# Patient Record
Sex: FEMALE | Race: BLACK OR AFRICAN AMERICAN | Employment: UNEMPLOYED | URBAN - METROPOLITAN AREA
[De-identification: names, ages, dates, MRNs, and addresses within clinical notes are randomized per-mention and may not be internally consistent; named-entity substitution may affect disease eponyms.]

---

## 2017-03-24 ENCOUNTER — APPOINTMENT (OUTPATIENT)
Dept: GENERAL RADIOLOGY | Age: 62
DRG: 283 | End: 2017-03-24
Attending: EMERGENCY MEDICINE
Payer: MEDICAID

## 2017-03-24 ENCOUNTER — APPOINTMENT (OUTPATIENT)
Dept: CT IMAGING | Age: 62
DRG: 283 | End: 2017-03-24
Attending: EMERGENCY MEDICINE
Payer: MEDICAID

## 2017-03-24 ENCOUNTER — APPOINTMENT (OUTPATIENT)
Dept: CT IMAGING | Age: 62
DRG: 283 | End: 2017-03-24
Attending: PHYSICIAN ASSISTANT
Payer: MEDICAID

## 2017-03-24 ENCOUNTER — HOSPITAL ENCOUNTER (INPATIENT)
Age: 62
LOS: 5 days | Discharge: HOME HEALTH | DRG: 283 | End: 2017-03-30
Attending: EMERGENCY MEDICINE | Admitting: HOSPITALIST
Payer: MEDICAID

## 2017-03-24 DIAGNOSIS — D64.9 SEVERE ANEMIA: Primary | ICD-10-CM

## 2017-03-24 DIAGNOSIS — R18.8 CIRRHOSIS OF LIVER WITH ASCITES, UNSPECIFIED HEPATIC CIRRHOSIS TYPE (HCC): ICD-10-CM

## 2017-03-24 DIAGNOSIS — B20 HIV (HUMAN IMMUNODEFICIENCY VIRUS INFECTION) (HCC): ICD-10-CM

## 2017-03-24 DIAGNOSIS — K74.60 CIRRHOSIS OF LIVER WITH ASCITES, UNSPECIFIED HEPATIC CIRRHOSIS TYPE (HCC): ICD-10-CM

## 2017-03-24 LAB
ALBUMIN SERPL BCP-MCNC: 2 G/DL (ref 3.5–5)
ALBUMIN/GLOB SERPL: 0.3 {RATIO} (ref 1.1–2.2)
ALP SERPL-CCNC: 130 U/L (ref 45–117)
ALT SERPL-CCNC: 39 U/L (ref 12–78)
ANION GAP BLD CALC-SCNC: 11 MMOL/L (ref 5–15)
AST SERPL W P-5'-P-CCNC: 69 U/L (ref 15–37)
BASOPHILS # BLD AUTO: 0 K/UL (ref 0–0.1)
BASOPHILS # BLD: 1 % (ref 0–1)
BILIRUB SERPL-MCNC: 0.4 MG/DL (ref 0.2–1)
BUN SERPL-MCNC: 17 MG/DL (ref 6–20)
BUN/CREAT SERPL: 18 (ref 12–20)
CALCIUM SERPL-MCNC: 7.5 MG/DL (ref 8.5–10.1)
CHLORIDE SERPL-SCNC: 113 MMOL/L (ref 97–108)
CK MB CFR SERPL CALC: 1.1 % (ref 0–2.5)
CK MB SERPL-MCNC: 1.3 NG/ML (ref 5–25)
CK SERPL-CCNC: 121 U/L (ref 26–192)
CO2 SERPL-SCNC: 18 MMOL/L (ref 21–32)
CREAT SERPL-MCNC: 0.97 MG/DL (ref 0.55–1.02)
EOSINOPHIL # BLD: 0.1 K/UL (ref 0–0.4)
EOSINOPHIL NFR BLD: 2 % (ref 0–7)
ERYTHROCYTE [DISTWIDTH] IN BLOOD BY AUTOMATED COUNT: 19.5 % (ref 11.5–14.5)
GLOBULIN SER CALC-MCNC: 6.1 G/DL (ref 2–4)
GLUCOSE SERPL-MCNC: 135 MG/DL (ref 65–100)
HCT VFR BLD AUTO: 16.4 % (ref 35–47)
HGB BLD-MCNC: 4.3 G/DL (ref 11.5–16)
INR PPP: 1.4 (ref 0.9–1.1)
LACTATE SERPL-SCNC: 1 MMOL/L (ref 0.4–2)
LYMPHOCYTES # BLD AUTO: 32 % (ref 12–49)
LYMPHOCYTES # BLD: 1.2 K/UL (ref 0.8–3.5)
MAGNESIUM SERPL-MCNC: 2.3 MG/DL (ref 1.6–2.4)
MCH RBC QN AUTO: 18.1 PG (ref 26–34)
MCHC RBC AUTO-ENTMCNC: 26.2 G/DL (ref 30–36.5)
MCV RBC AUTO: 69.2 FL (ref 80–99)
MONOCYTES # BLD: 0.4 K/UL (ref 0–1)
MONOCYTES NFR BLD AUTO: 10 % (ref 5–13)
NEUTS SEG # BLD: 2 K/UL (ref 1.8–8)
NEUTS SEG NFR BLD AUTO: 55 % (ref 32–75)
PLATELET # BLD AUTO: 166 K/UL (ref 150–400)
POTASSIUM SERPL-SCNC: 3.5 MMOL/L (ref 3.5–5.1)
PROT SERPL-MCNC: 8.1 G/DL (ref 6.4–8.2)
PROTHROMBIN TIME: 14.5 SEC (ref 9–11.1)
RBC # BLD AUTO: 2.37 M/UL (ref 3.8–5.2)
RBC MORPH BLD: ABNORMAL
SODIUM SERPL-SCNC: 142 MMOL/L (ref 136–145)
TROPONIN I SERPL-MCNC: <0.04 NG/ML
WBC # BLD AUTO: 3.7 K/UL (ref 3.6–11)

## 2017-03-24 PROCEDURE — 86920 COMPATIBILITY TEST SPIN: CPT | Performed by: EMERGENCY MEDICINE

## 2017-03-24 PROCEDURE — 87086 URINE CULTURE/COLONY COUNT: CPT | Performed by: EMERGENCY MEDICINE

## 2017-03-24 PROCEDURE — 70491 CT SOFT TISSUE NECK W/DYE: CPT

## 2017-03-24 PROCEDURE — 74177 CT ABD & PELVIS W/CONTRAST: CPT

## 2017-03-24 PROCEDURE — 93005 ELECTROCARDIOGRAM TRACING: CPT

## 2017-03-24 PROCEDURE — 36415 COLL VENOUS BLD VENIPUNCTURE: CPT | Performed by: EMERGENCY MEDICINE

## 2017-03-24 PROCEDURE — 74011250636 HC RX REV CODE- 250/636: Performed by: PHYSICIAN ASSISTANT

## 2017-03-24 PROCEDURE — 87040 BLOOD CULTURE FOR BACTERIA: CPT | Performed by: EMERGENCY MEDICINE

## 2017-03-24 PROCEDURE — P9016 RBC LEUKOCYTES REDUCED: HCPCS | Performed by: EMERGENCY MEDICINE

## 2017-03-24 PROCEDURE — 36430 TRANSFUSION BLD/BLD COMPNT: CPT

## 2017-03-24 PROCEDURE — 83735 ASSAY OF MAGNESIUM: CPT | Performed by: PHYSICIAN ASSISTANT

## 2017-03-24 PROCEDURE — 86900 BLOOD TYPING SEROLOGIC ABO: CPT | Performed by: EMERGENCY MEDICINE

## 2017-03-24 PROCEDURE — 96365 THER/PROPH/DIAG IV INF INIT: CPT

## 2017-03-24 PROCEDURE — 96375 TX/PRO/DX INJ NEW DRUG ADDON: CPT

## 2017-03-24 PROCEDURE — 84484 ASSAY OF TROPONIN QUANT: CPT | Performed by: PHYSICIAN ASSISTANT

## 2017-03-24 PROCEDURE — 83605 ASSAY OF LACTIC ACID: CPT | Performed by: PHYSICIAN ASSISTANT

## 2017-03-24 PROCEDURE — 99285 EMERGENCY DEPT VISIT HI MDM: CPT

## 2017-03-24 PROCEDURE — 86701 HIV-1ANTIBODY: CPT | Performed by: EMERGENCY MEDICINE

## 2017-03-24 PROCEDURE — 82550 ASSAY OF CK (CPK): CPT | Performed by: PHYSICIAN ASSISTANT

## 2017-03-24 PROCEDURE — 71010 XR CHEST PORT: CPT

## 2017-03-24 PROCEDURE — 80053 COMPREHEN METABOLIC PANEL: CPT | Performed by: EMERGENCY MEDICINE

## 2017-03-24 PROCEDURE — 85610 PROTHROMBIN TIME: CPT | Performed by: EMERGENCY MEDICINE

## 2017-03-24 PROCEDURE — 81001 URINALYSIS AUTO W/SCOPE: CPT | Performed by: EMERGENCY MEDICINE

## 2017-03-24 PROCEDURE — 96361 HYDRATE IV INFUSION ADD-ON: CPT

## 2017-03-24 PROCEDURE — 85025 COMPLETE CBC W/AUTO DIFF WBC: CPT | Performed by: EMERGENCY MEDICINE

## 2017-03-24 RX ORDER — MORPHINE SULFATE 2 MG/ML
4 INJECTION, SOLUTION INTRAMUSCULAR; INTRAVENOUS
Status: COMPLETED | OUTPATIENT
Start: 2017-03-24 | End: 2017-03-24

## 2017-03-24 RX ORDER — SODIUM CHLORIDE 9 MG/ML
50 INJECTION, SOLUTION INTRAVENOUS
Status: COMPLETED | OUTPATIENT
Start: 2017-03-24 | End: 2017-03-25

## 2017-03-24 RX ORDER — SODIUM CHLORIDE 9 MG/ML
250 INJECTION, SOLUTION INTRAVENOUS AS NEEDED
Status: DISCONTINUED | OUTPATIENT
Start: 2017-03-24 | End: 2017-03-26 | Stop reason: ALTCHOICE

## 2017-03-24 RX ORDER — SODIUM CHLORIDE 0.9 % (FLUSH) 0.9 %
10 SYRINGE (ML) INJECTION
Status: COMPLETED | OUTPATIENT
Start: 2017-03-24 | End: 2017-03-25

## 2017-03-24 RX ADMIN — SODIUM CHLORIDE 500 ML: 900 INJECTION, SOLUTION INTRAVENOUS at 22:21

## 2017-03-24 RX ADMIN — Medication 4 MG: at 22:23

## 2017-03-24 NOTE — IP AVS SNAPSHOT
[address] [facility] 83. 
[phone] Patient: Conchita Ricardo MRN: [id number] NSD:3/92/[id number] You are allergic to the following Allergen Reactions Codeine Itching Patient denies Codeine allergy Penicillin V Hives Patient denies penicillin allergy. Pollen Extracts Runny Nose Itchy, headaches, sneezing Recent Documentation Height Weight Breastfeeding? BMI OB Status Smoking Status 1.702 m 69.7 kg No 24.07 kg/m2 Hysterectomy Current Every Day Smoker Unresulted Labs Order Current Status AFB CULTURE + SMEAR W/RFLX PCR AND ID In process AFB CULTURE + SMEAR W/RFLX PCR AND ID In process COMPLEMENT C2 In process CRYOGLOBULIN, QL In process CULTURE, FUNGUS In process HLA  GENOTYPING In process QUANTIFERON TB GOLD(CLIENT INCUB.) In process SAMPLE TO BLOOD BANK In process CULTURE, ANAEROBIC Preliminary result CULTURE, BODY FLUID W GRAM STAIN Preliminary result HEPATITIS C GENOTYPE Preliminary result Emergency Contacts Name Discharge Info Relation Home Work Mobile [employer] DISCHARGE CAREGIVER [3] Child [2] [phone] About your hospitalization You were admitted on:  March 25, 2017 You last received care in the:  [facility] 2 GENERAL SURGERY You were discharged on:  March 30, 2017 Unit phone number:  [phone] Why you were hospitalized Your primary diagnosis was:  Generalized Abdominal Pain Your diagnoses also included:  Severe Anemia, Chronic Hepatitis C Without Mention Of Hepatic Coma (Hcc), Hiv (Human Immunodeficiency Virus Infection) (Hcc) Providers Seen During Your Hospitalizations Provider Role Specialty Primary office phone Joshua Walton MD Attending Provider Emergency Medicine [phone] Evalina Castleman, MD Attending Provider Internal Medicine [phone] Chloé Bello MD Attending Provider Internal Medicine [phone] Priti Rodriguez MD Attending Provider Hospitalist [phone] Your Primary Care Physician (PCP) Primary Care Physician Office Phone Office Fax [address], [address] [place] [phone] Follow-up Information Follow up With Details Comments Contact Info Ramin Blackman MD   [address] [facility] 57 
[phone] Juan Alberto Wills MD Go on 4/13/2017 APPOINTMENT TIME 3:00 PM [address] [place] [address] 
[phone] Gio Samayoa MD Call someone will call you with an appointment date and time [facility] 203 [facility] 
[phone] Martell Brunner, MD Schedule an appointment as soon as possible for a visit Call to make an appointment to see Dr. Shayy Meier in two weeks [address] [place] [address] [facility] 
[phone] Current Discharge Medication List  
  
START taking these medications Dose & Instructions Dispensing Information Comments Morning Noon Evening Bedtime  
 ferrous sulfate 325 mg (65 mg iron) tablet Your last dose was: Your next dose is:    
   
   
 Dose:  325 mg Take 1 Tab by mouth two (2) times daily (with meals). Quantity:  60 Tab Refills:  1  
     
   
   
   
  
 lactulose 10 gram/15 mL solution Commonly known as:  Maximiano Ros Your last dose was: Your next dose is:    
   
   
 Dose:  10 g Take 15 mL by mouth two (2) times a day for 30 days. Quantity:  900 mL Refills:  1  
     
   
   
   
  
 moxifloxacin 400 mg tablet Commonly known as:  Verlyn Nay Your last dose was: Your next dose is:    
   
   
 Dose:  400 mg Take 1 Tab by mouth daily for 7 days. Quantity:  7 Tab Refills:  0 propranolol 10 mg tablet Commonly known as:  INDERAL Your last dose was: Your next dose is:    
   
   
 Dose:  10 mg Take 1 Tab by mouth two (2) times a day. Quantity:  60 Tab Refills:  1  
     
   
   
   
  
 spironolactone 25 mg tablet Commonly known as:  ALDACTONE Your last dose was: Your next dose is:    
   
   
 Dose:  75 mg Take 3 Tabs by mouth daily. Quantity:  90 Tab Refills:  1  
     
   
   
   
  
 trimethoprim-sulfamethoxazole  mg per tablet Commonly known as:  BACTRIM Your last dose was: Your next dose is:    
   
   
 Dose:  1 Tab Take 1 Tab by mouth two (2) times a day for 7 days. Quantity:  14 Tab Refills:  0 CONTINUE these medications which have CHANGED Dose & Instructions Dispensing Information Comments Morning Noon Evening Bedtime  
 insulin lispro 100 unit/mL kwikpen Commonly known as:  HumaLOG KwikPen What changed:  See the new instructions. Your last dose was: Your next dose is:    
   
   
 Dose:  1 Units 1 Units by SubCUTAneous route Before breakfast, lunch, dinner and at bedtime. 150-200=2 nits 201-250=4 units 251-300=6 units 301-350=8 units 351-400=10 units More than 400 call MD  
 Quantity:  15 mL Refills:  1  
     
   
   
   
  
 oxyCODONE IR 10 mg Tab immediate release tablet Commonly known as:  Arcenio Mcnulty What changed:   
- medication strength 
- how much to take - when to take this 
- reasons to take this Your last dose was: Your next dose is:    
   
   
 Dose:  10 mg Take 1 Tab by mouth every four (4) hours as needed. Max Daily Amount: 60 mg.  
 Quantity:  30 Tab Refills:  0 CONTINUE these medications which have NOT CHANGED Dose & Instructions Dispensing Information Comments Morning Noon Evening Bedtime  
 albuterol 90 mcg/actuation inhaler Commonly known as:  PROVENTIL HFA, VENTOLIN HFA, PROAIR HFA Your last dose was: Your next dose is:    
   
   
 Dose:  2 Puff Take 2 puffs by inhalation every four (4) hours as needed for Wheezing. Quantity:  1 Inhaler Refills:  12 Blood-Glucose Meter monitoring kit Your last dose was: Your next dose is:    
   
   
 Monitor your blood sugar 3 times daily. Quantity:  1 Kit Refills:  0  
     
   
   
   
  
 FISH OIL 1,000 mg Cap Generic drug:  omega-3 fatty acids-vitamin e Your last dose was: Your next dose is:    
   
   
 Dose:  1 Cap Take 1 Cap by mouth every other day. Refills:  0  
     
   
   
   
  
 fluticasone-salmeterol 250-50 mcg/dose diskus inhaler Commonly known as:  ADVAIR Your last dose was: Your next dose is:    
   
   
 Dose:  1 Puff Take 1 Puff by inhalation two (2) times a day. Quantity:  1 Inhaler Refills:  12  
     
   
   
   
  
 * glucose blood VI test strips strip Commonly known as:  blood glucose test  
   
Your last dose was: Your next dose is: As directed Quantity:  100 Strip Refills:  11  
     
   
   
   
  
 * glucose blood VI test strips strip Commonly known as:  TRUETEST TEST STRIPS Your last dose was: Your next dose is:    
   
   
 Patient test once daily Quantity:  50 Strip Refills:  11 Insulin Needles (Disposable) 31 gauge x 3/16\" Ndle Commonly known as:  BD INSULIN PEN NEEDLE UF MINI Your last dose was: Your next dose is:    
   
   
 Use as directed with insulin pen Quantity:  100 Each Refills:  12 * Lancets Misc Your last dose was: Your next dose is: As directed Quantity:  1 Each Refills:  11  
     
   
   
   
  
 * TRUEPLUS LANCETS 33 gauge Misc Generic drug:  lancets Your last dose was: Your next dose is:    
   
   
  Refills:  0 Nebulizer & Compressor machine Your last dose was: Your next dose is:    
   
   
 Dose:  1 Each  
1 each by Does Not Apply route four (4) times daily as needed. Quantity:  1 each Refills:  0 SPIRIVA WITH HANDIHALER 18 mcg inhalation capsule Generic drug:  tiotropium Your last dose was: Your next dose is:    
   
   
 inhale the contents of one capsule in the handihaler once daily Quantity:  30 Cap Refills:  2  
     
   
   
   
  
 triamcinolone 0.5 % topical cream  
Commonly known as:  ARISTOCORT Your last dose was: Your next dose is:    
   
   
 Apply  to affected area two (2) times a day. use thin layer Quantity:  45 g Refills:  5 VITAMIN C 1,000 mg tablet Generic drug:  ascorbic acid (vitamin C) Your last dose was: Your next dose is: Take  by mouth every other day. Refills:  0  
     
   
   
   
  
 * Notice: This list has 4 medication(s) that are the same as other medications prescribed for you. Read the directions carefully, and ask your doctor or other care provider to review them with you. STOP taking these medications   
 amLODIPine 10 mg tablet Commonly known as:  Suri Fraction HYDROcodone-acetaminophen 7.5-325 mg per tablet Commonly known as:  NORCO  
   
  
 ibuprofen 800 mg tablet Commonly known as:  MOTRIN  
   
  
 insulin glargine 100 unit/mL injection Commonly known as:  LANTUS  
   
  
 naproxen 500 mg tablet Commonly known as:  NAPROSYN Where to Get Your Medications Information on where to get these meds will be given to you by the nurse or doctor. ! Ask your nurse or doctor about these medications  
  ferrous sulfate 325 mg (65 mg iron) tablet  
 insulin lispro 100 unit/mL kwikpen  
 lactulose 10 gram/15 mL solution moxifloxacin 400 mg tablet  
 oxyCODONE IR 10 mg Tab immediate release tablet  
 propranolol 10 mg tablet  
 spironolactone 25 mg tablet  
 trimethoprim-sulfamethoxazole  mg per tablet Discharge Instructions HOSPITALIST DISCHARGE INSTRUCTIONS 
NAME: Tanika LINDSEY:  1955 MRN:  [id number] Date/Time:  3/30/2017 10:05 AM 
 
ADMIT DATE: 3/24/2017 DISCHARGE DATE: 3/30/2017 DIAGNOSIS:  Leg Cellulitis and infected wound, SBP, Cirrhosis, HIV, HepC, DM, COPD MEDICATIONS As per medication reconciliation · It is important that you take the medication exactly as they are prescribed. · Keep your medication in the bottles provided by the pharmacist and keep a list of the medication names, dosages, and times to be taken in your wallet. · Do not take other medications without consulting your doctor. Pain Management: per above medications What to do at [facility] Recommended diet:  Cardiac Diet and Diabetic Diet Recommended activity: Activity as tolerated and No driving while on analgesics If you experience any of the following symptoms then please call your primary care physician or return to the emergency room if you cannot get hold of your doctor: 
Fever, chills, nausea, vomiting, diarrhea, change in mentation, falling, bleeding, shortness of breath. Follow Up: 
 PCP you are to call and set up an appointment to see them in 2 week. F/U GI 
F/U ID 
F/U Rheumatology Information obtained by : 
I understand that if any problems occur once I am at home I am to contact my physician. I understand and acknowledge receipt of the instructions indicated above. Physician's or R.N.'s Signature                                                                  Date/Time Patient or Representative Signature                                                          Date/Time Discharge Orders None [employer] Announcement We are excited to announce that we are making your provider's discharge notes available to you in QR Wild. You will see these notes when they are completed and signed by the physician that discharged you from your recent hospital stay. If you have any questions or concerns about any information you see in QR Wild, please call the Health Information Department where you were seen or reach out to your Primary Care Provider for more information about your plan of care. Introducing [facility]! [employer] introduces QR Wild patient portal. Now you can access parts of your medical record, email your doctor's office, and request medication refills online. 1. In your internet browser, go to [email or web address] [employer] Share. [employer]/Compare And Share 2. Click on the First Time User? Click Here link in the Sign In box. You will see the New Member Sign Up page. 3. Enter your QR Wild Access Code exactly as it appears below. You will not need to use this code after youve completed the sign-up process. If you do not sign up before the expiration date, you must request a new code. · QR Wild Access Code: [email or web address] Expires: 6/22/2017  7:09 PM 
 
4. Enter the last four digits of your Social Security Number (xxxx) and Date of Birth (mm/dd/yyyy) as indicated and click Submit. You will be taken to the next sign-up page. 5. Create a QR Wild ID. This will be your QR Wild login ID and cannot be changed, so think of one that is secure and easy to remember. 6. Create a QR Wild password. You can change your password at any time. 7. Enter your Password Reset Question and Answer.  This can be used at a later time if you forget your password. 8. Enter your e-mail address. You will receive e-mail notification when new information is available in [address] 9. Click Sign Up. You can now view and download portions of your medical record. 10. Click the Download Summary menu link to download a portable copy of your medical information. If you have questions, please visit the Frequently Asked Questions section of the [employer] website. Remember, [employer] is NOT to be used for urgent needs. For medical emergencies, dial 911. Now available from your iPhone and Android! General Information Please provide this summary of care documentation to your next provider. Patient Signature:  ____________________________________________________________ Date:  ____________________________________________________________  
  
Aloma Snellen Provider Signature:  ____________________________________________________________ Date:  ____________________________________________________________

## 2017-03-24 NOTE — IP AVS SNAPSHOT
Current Discharge Medication List  
  
START taking these medications Dose & Instructions Dispensing Information Comments Morning Noon Evening Bedtime  
 ferrous sulfate 325 mg (65 mg iron) tablet Your last dose was: Your next dose is:    
   
   
 Dose:  325 mg Take 1 Tab by mouth two (2) times daily (with meals). Quantity:  60 Tab Refills:  1  
     
   
   
   
  
 lactulose 10 gram/15 mL solution Commonly known as:  Tal Best Your last dose was: Your next dose is:    
   
   
 Dose:  10 g Take 15 mL by mouth two (2) times a day for 30 days. Quantity:  900 mL Refills:  1  
     
   
   
   
  
 moxifloxacin 400 mg tablet Commonly known as:  Gordan Dire Your last dose was: Your next dose is:    
   
   
 Dose:  400 mg Take 1 Tab by mouth daily for 7 days. Quantity:  7 Tab Refills:  0  
     
   
   
   
  
 propranolol 10 mg tablet Commonly known as:  INDERAL Your last dose was: Your next dose is:    
   
   
 Dose:  10 mg Take 1 Tab by mouth two (2) times a day. Quantity:  60 Tab Refills:  1  
     
   
   
   
  
 spironolactone 25 mg tablet Commonly known as:  ALDACTONE Your last dose was: Your next dose is:    
   
   
 Dose:  75 mg Take 3 Tabs by mouth daily. Quantity:  90 Tab Refills:  1  
     
   
   
   
  
 trimethoprim-sulfamethoxazole  mg per tablet Commonly known as:  BACTRIM Your last dose was: Your next dose is:    
   
   
 Dose:  1 Tab Take 1 Tab by mouth two (2) times a day for 7 days. Quantity:  14 Tab Refills:  0 CONTINUE these medications which have CHANGED Dose & Instructions Dispensing Information Comments Morning Noon Evening Bedtime  
 insulin lispro 100 unit/mL kwikpen Commonly known as:  HumaLOG KwikPen What changed:  See the new instructions. Your last dose was: Your next dose is:    
   
   
 Dose:  1 Units 1 Units by SubCUTAneous route Before breakfast, lunch, dinner and at bedtime. 150-200=2 nits 201-250=4 units 251-300=6 units 301-350=8 units 351-400=10 units More than 400 call MD  
 Quantity:  15 mL Refills:  1  
     
   
   
   
  
 oxyCODONE IR 10 mg Tab immediate release tablet Commonly known as:  Jarad Bello What changed:   
- medication strength 
- how much to take - when to take this 
- reasons to take this Your last dose was: Your next dose is:    
   
   
 Dose:  10 mg Take 1 Tab by mouth every four (4) hours as needed. Max Daily Amount: 60 mg.  
 Quantity:  30 Tab Refills:  0 CONTINUE these medications which have NOT CHANGED Dose & Instructions Dispensing Information Comments Morning Noon Evening Bedtime  
 albuterol 90 mcg/actuation inhaler Commonly known as:  PROVENTIL HFA, VENTOLIN HFA, PROAIR HFA Your last dose was: Your next dose is:    
   
   
 Dose:  2 Puff Take 2 puffs by inhalation every four (4) hours as needed for Wheezing. Quantity:  1 Inhaler Refills:  12 Blood-Glucose Meter monitoring kit Your last dose was: Your next dose is:    
   
   
 Monitor your blood sugar 3 times daily. Quantity:  1 Kit Refills:  0  
     
   
   
   
  
 FISH OIL 1,000 mg Cap Generic drug:  omega-3 fatty acids-vitamin e Your last dose was: Your next dose is:    
   
   
 Dose:  1 Cap Take 1 Cap by mouth every other day. Refills:  0  
     
   
   
   
  
 fluticasone-salmeterol 250-50 mcg/dose diskus inhaler Commonly known as:  ADVAIR Your last dose was: Your next dose is:    
   
   
 Dose:  1 Puff Take 1 Puff by inhalation two (2) times a day. Quantity:  1 Inhaler Refills:  12  
     
   
   
   
  
 * glucose blood VI test strips strip Commonly known as:  blood glucose test  
   
 Your last dose was: Your next dose is: As directed Quantity:  100 Strip Refills:  11  
     
   
   
   
  
 * glucose blood VI test strips strip Commonly known as:  TRUETEST TEST STRIPS Your last dose was: Your next dose is:    
   
   
 Patient test once daily Quantity:  50 Strip Refills:  11 Insulin Needles (Disposable) 31 gauge x 3/16\" Ndle Commonly known as:  BD INSULIN PEN NEEDLE UF MINI Your last dose was: Your next dose is:    
   
   
 Use as directed with insulin pen Quantity:  100 Each Refills:  12 * Lancets Misc Your last dose was: Your next dose is: As directed Quantity:  1 Each Refills:  11  
     
   
   
   
  
 * TRUEPLUS LANCETS 33 gauge Misc Generic drug:  lancets Your last dose was: Your next dose is:    
   
   
  Refills:  0 Nebulizer & Compressor machine Your last dose was: Your next dose is:    
   
   
 Dose:  1 Each  
1 each by Does Not Apply route four (4) times daily as needed. Quantity:  1 each Refills:  0 SPIRIVA WITH HANDIHALER 18 mcg inhalation capsule Generic drug:  tiotropium Your last dose was: Your next dose is:    
   
   
 inhale the contents of one capsule in the handihaler once daily Quantity:  30 Cap Refills:  2  
     
   
   
   
  
 triamcinolone 0.5 % topical cream  
Commonly known as:  ARISTOCORT Your last dose was: Your next dose is:    
   
   
 Apply  to affected area two (2) times a day. use thin layer Quantity:  45 g Refills:  5 VITAMIN C 1,000 mg tablet Generic drug:  ascorbic acid (vitamin C) Your last dose was: Your next dose is: Take  by mouth every other day. Refills:  0 * Notice: This list has 4 medication(s) that are the same as other medications prescribed for you. Read the directions carefully, and ask your doctor or other care provider to review them with you. STOP taking these medications   
 amLODIPine 10 mg tablet Commonly known as:  Strong Sandeep HYDROcodone-acetaminophen 7.5-325 mg per tablet Commonly known as:  NORCO  
   
  
 ibuprofen 800 mg tablet Commonly known as:  MOTRIN  
   
  
 insulin glargine 100 unit/mL injection Commonly known as:  LANTUS  
   
  
 naproxen 500 mg tablet Commonly known as:  NAPROSYN Where to Get Your Medications Information on where to get these meds will be given to you by the nurse or doctor. ! Ask your nurse or doctor about these medications  
  ferrous sulfate 325 mg (65 mg iron) tablet  
 insulin lispro 100 unit/mL kwikpen  
 lactulose 10 gram/15 mL solution  
 moxifloxacin 400 mg tablet  
 oxyCODONE IR 10 mg Tab immediate release tablet  
 propranolol 10 mg tablet  
 spironolactone 25 mg tablet  
 trimethoprim-sulfamethoxazole  mg per tablet

## 2017-03-25 PROBLEM — D64.9 SEVERE ANEMIA: Status: ACTIVE | Noted: 2017-03-25

## 2017-03-25 PROBLEM — R10.84 GENERALIZED ABDOMINAL PAIN: Status: ACTIVE | Noted: 2017-03-25

## 2017-03-25 LAB
AMMONIA PLAS-SCNC: 67 UMOL/L
ANION GAP BLD CALC-SCNC: 8 MMOL/L (ref 5–15)
APPEARANCE UR: CLEAR
ATRIAL RATE: 104 BPM
BACTERIA URNS QL MICRO: NEGATIVE /HPF
BILIRUB UR QL: NEGATIVE
BUN SERPL-MCNC: 12 MG/DL (ref 6–20)
BUN/CREAT SERPL: 15 (ref 12–20)
CALCIUM SERPL-MCNC: 7.1 MG/DL (ref 8.5–10.1)
CALCULATED P AXIS, ECG09: 60 DEGREES
CALCULATED R AXIS, ECG10: 49 DEGREES
CALCULATED T AXIS, ECG11: 38 DEGREES
CHLORIDE SERPL-SCNC: 114 MMOL/L (ref 97–108)
CO2 SERPL-SCNC: 20 MMOL/L (ref 21–32)
COLOR UR: ABNORMAL
CREAT SERPL-MCNC: 0.78 MG/DL (ref 0.55–1.02)
CRP SERPL-MCNC: <0.29 MG/DL (ref 0–0.6)
DIAGNOSIS, 93000: NORMAL
EPITH CASTS URNS QL MICRO: ABNORMAL /LPF
ERYTHROCYTE [SEDIMENTATION RATE] IN BLOOD: 85 MM/HR (ref 0–30)
EST. AVERAGE GLUCOSE BLD GHB EST-MCNC: 108 MG/DL
EST. AVERAGE GLUCOSE BLD GHB EST-MCNC: ABNORMAL MG/DL
GLUCOSE BLD STRIP.AUTO-MCNC: 110 MG/DL (ref 65–100)
GLUCOSE BLD STRIP.AUTO-MCNC: 138 MG/DL (ref 65–100)
GLUCOSE BLD STRIP.AUTO-MCNC: 145 MG/DL (ref 65–100)
GLUCOSE BLD STRIP.AUTO-MCNC: 176 MG/DL (ref 65–100)
GLUCOSE BLD STRIP.AUTO-MCNC: 196 MG/DL (ref 65–100)
GLUCOSE SERPL-MCNC: 163 MG/DL (ref 65–100)
GLUCOSE UR STRIP.AUTO-MCNC: NEGATIVE MG/DL
HBA1C MFR BLD: 5.4 % (ref 4.2–6.3)
HBA1C MFR BLD: <3.5 % (ref 4.2–6.3)
HCT VFR BLD AUTO: 22.3 % (ref 35–47)
HCT VFR BLD AUTO: 25.3 % (ref 35–47)
HEMOCCULT STL QL: NEGATIVE
HGB BLD-MCNC: 6.6 G/DL (ref 11.5–16)
HGB BLD-MCNC: 7.8 G/DL (ref 11.5–16)
HGB UR QL STRIP: ABNORMAL
KETONES UR QL STRIP.AUTO: NEGATIVE MG/DL
LEUKOCYTE ESTERASE UR QL STRIP.AUTO: ABNORMAL
NITRITE UR QL STRIP.AUTO: NEGATIVE
P-R INTERVAL, ECG05: 160 MS
PH UR STRIP: 6 [PH] (ref 5–8)
POTASSIUM SERPL-SCNC: 3.3 MMOL/L (ref 3.5–5.1)
PROT UR STRIP-MCNC: ABNORMAL MG/DL
Q-T INTERVAL, ECG07: 340 MS
QRS DURATION, ECG06: 70 MS
QTC CALCULATION (BEZET), ECG08: 447 MS
RBC #/AREA URNS HPF: ABNORMAL /HPF (ref 0–5)
RHEUMATOID FACT SERPL-ACNC: <10 IU/ML
SERVICE CMNT-IMP: ABNORMAL
SODIUM SERPL-SCNC: 142 MMOL/L (ref 136–145)
SP GR UR REFRACTOMETRY: 1.02 (ref 1–1.03)
UA: UC IF INDICATED,UAUC: ABNORMAL
UROBILINOGEN UR QL STRIP.AUTO: 1 EU/DL (ref 0.2–1)
VENTRICULAR RATE, ECG03: 104 BPM
WBC URNS QL MICRO: ABNORMAL /HPF (ref 0–4)

## 2017-03-25 PROCEDURE — 83036 HEMOGLOBIN GLYCOSYLATED A1C: CPT | Performed by: INTERNAL MEDICINE

## 2017-03-25 PROCEDURE — 81381 HLA I TYPING 1 ALLELE HR: CPT | Performed by: INTERNAL MEDICINE

## 2017-03-25 PROCEDURE — 82962 GLUCOSE BLOOD TEST: CPT

## 2017-03-25 PROCEDURE — 74011250637 HC RX REV CODE- 250/637: Performed by: INTERNAL MEDICINE

## 2017-03-25 PROCEDURE — 65270000029 HC RM PRIVATE

## 2017-03-25 PROCEDURE — 74011250636 HC RX REV CODE- 250/636: Performed by: EMERGENCY MEDICINE

## 2017-03-25 PROCEDURE — 86160 COMPLEMENT ANTIGEN: CPT | Performed by: INTERNAL MEDICINE

## 2017-03-25 PROCEDURE — 74011636320 HC RX REV CODE- 636/320: Performed by: EMERGENCY MEDICINE

## 2017-03-25 PROCEDURE — P9016 RBC LEUKOCYTES REDUCED: HCPCS | Performed by: EMERGENCY MEDICINE

## 2017-03-25 PROCEDURE — C9113 INJ PANTOPRAZOLE SODIUM, VIA: HCPCS | Performed by: HOSPITALIST

## 2017-03-25 PROCEDURE — 36415 COLL VENOUS BLD VENIPUNCTURE: CPT | Performed by: INTERNAL MEDICINE

## 2017-03-25 PROCEDURE — 86360 T CELL ABSOLUTE COUNT/RATIO: CPT | Performed by: HOSPITALIST

## 2017-03-25 PROCEDURE — 87536 HIV-1 QUANT&REVRSE TRNSCRPJ: CPT | Performed by: INTERNAL MEDICINE

## 2017-03-25 PROCEDURE — 86706 HEP B SURFACE ANTIBODY: CPT | Performed by: INTERNAL MEDICINE

## 2017-03-25 PROCEDURE — 74011000258 HC RX REV CODE- 258: Performed by: EMERGENCY MEDICINE

## 2017-03-25 PROCEDURE — 74011000250 HC RX REV CODE- 250: Performed by: HOSPITALIST

## 2017-03-25 PROCEDURE — 82955 ASSAY OF G6PD ENZYME: CPT | Performed by: INTERNAL MEDICINE

## 2017-03-25 PROCEDURE — 82272 OCCULT BLD FECES 1-3 TESTS: CPT | Performed by: HOSPITALIST

## 2017-03-25 PROCEDURE — 87340 HEPATITIS B SURFACE AG IA: CPT | Performed by: INTERNAL MEDICINE

## 2017-03-25 PROCEDURE — 86332 IMMUNE COMPLEX ASSAY: CPT | Performed by: INTERNAL MEDICINE

## 2017-03-25 PROCEDURE — 74011250636 HC RX REV CODE- 250/636: Performed by: HOSPITALIST

## 2017-03-25 PROCEDURE — 82140 ASSAY OF AMMONIA: CPT | Performed by: HOSPITALIST

## 2017-03-25 PROCEDURE — 86592 SYPHILIS TEST NON-TREP QUAL: CPT | Performed by: INTERNAL MEDICINE

## 2017-03-25 PROCEDURE — 86140 C-REACTIVE PROTEIN: CPT | Performed by: INTERNAL MEDICINE

## 2017-03-25 PROCEDURE — 85652 RBC SED RATE AUTOMATED: CPT | Performed by: INTERNAL MEDICINE

## 2017-03-25 PROCEDURE — 85018 HEMOGLOBIN: CPT | Performed by: INTERNAL MEDICINE

## 2017-03-25 PROCEDURE — 36430 TRANSFUSION BLD/BLD COMPNT: CPT

## 2017-03-25 PROCEDURE — 30233N1 TRANSFUSION OF NONAUTOLOGOUS RED BLOOD CELLS INTO PERIPHERAL VEIN, PERCUTANEOUS APPROACH: ICD-10-PCS | Performed by: INTERNAL MEDICINE

## 2017-03-25 PROCEDURE — 74011250636 HC RX REV CODE- 250/636: Performed by: INTERNAL MEDICINE

## 2017-03-25 PROCEDURE — 86431 RHEUMATOID FACTOR QUANT: CPT | Performed by: INTERNAL MEDICINE

## 2017-03-25 PROCEDURE — 80048 BASIC METABOLIC PNL TOTAL CA: CPT | Performed by: INTERNAL MEDICINE

## 2017-03-25 PROCEDURE — 74011250637 HC RX REV CODE- 250/637: Performed by: HOSPITALIST

## 2017-03-25 PROCEDURE — 77030032490 HC SLV COMPR SCD KNE COVD -B

## 2017-03-25 PROCEDURE — 77030027138 HC INCENT SPIROMETER -A

## 2017-03-25 PROCEDURE — 86360 T CELL ABSOLUTE COUNT/RATIO: CPT | Performed by: INTERNAL MEDICINE

## 2017-03-25 PROCEDURE — 86060 ANTISTREPTOLYSIN O TITER: CPT | Performed by: INTERNAL MEDICINE

## 2017-03-25 PROCEDURE — 87521 HEPATITIS C PROBE&RVRS TRNSC: CPT | Performed by: INTERNAL MEDICINE

## 2017-03-25 PROCEDURE — 83036 HEMOGLOBIN GLYCOSYLATED A1C: CPT | Performed by: HOSPITALIST

## 2017-03-25 RX ORDER — SODIUM CHLORIDE 9 MG/ML
250 INJECTION, SOLUTION INTRAVENOUS AS NEEDED
Status: DISCONTINUED | OUTPATIENT
Start: 2017-03-25 | End: 2017-03-26 | Stop reason: ALTCHOICE

## 2017-03-25 RX ORDER — TRIAMCINOLONE ACETONIDE 1 MG/G
CREAM TOPICAL 2 TIMES DAILY
Status: DISCONTINUED | OUTPATIENT
Start: 2017-03-25 | End: 2017-03-30 | Stop reason: HOSPADM

## 2017-03-25 RX ORDER — HYDROMORPHONE HYDROCHLORIDE 1 MG/ML
1 INJECTION, SOLUTION INTRAMUSCULAR; INTRAVENOUS; SUBCUTANEOUS
Status: COMPLETED | OUTPATIENT
Start: 2017-03-25 | End: 2017-03-25

## 2017-03-25 RX ORDER — LEVOFLOXACIN 5 MG/ML
750 INJECTION, SOLUTION INTRAVENOUS EVERY 24 HOURS
Status: DISCONTINUED | OUTPATIENT
Start: 2017-03-25 | End: 2017-03-25

## 2017-03-25 RX ORDER — INSULIN LISPRO 100 [IU]/ML
INJECTION, SOLUTION INTRAVENOUS; SUBCUTANEOUS EVERY 6 HOURS
Status: DISCONTINUED | OUTPATIENT
Start: 2017-03-25 | End: 2017-03-26

## 2017-03-25 RX ORDER — DEXTROSE 50 % IN WATER (D50W) INTRAVENOUS SYRINGE
12.5-25 AS NEEDED
Status: DISCONTINUED | OUTPATIENT
Start: 2017-03-25 | End: 2017-03-30 | Stop reason: HOSPADM

## 2017-03-25 RX ORDER — HYDROCODONE BITARTRATE AND ACETAMINOPHEN 10; 325 MG/1; MG/1
1 TABLET ORAL
Status: DISCONTINUED | OUTPATIENT
Start: 2017-03-25 | End: 2017-03-29

## 2017-03-25 RX ORDER — VANCOMYCIN 2 GRAM/500 ML IN 0.9 % SODIUM CHLORIDE INTRAVENOUS
2000 ONCE
Status: COMPLETED | OUTPATIENT
Start: 2017-03-25 | End: 2017-03-28

## 2017-03-25 RX ORDER — ACETAMINOPHEN 325 MG/1
650 TABLET ORAL
Status: DISCONTINUED | OUTPATIENT
Start: 2017-03-25 | End: 2017-03-27

## 2017-03-25 RX ORDER — VANCOMYCIN HYDROCHLORIDE
1250 EVERY 24 HOURS
Status: DISCONTINUED | OUTPATIENT
Start: 2017-03-26 | End: 2017-03-25

## 2017-03-25 RX ORDER — POLYETHYLENE GLYCOL 3350 17 G/17G
17 POWDER, FOR SOLUTION ORAL
Status: DISCONTINUED | OUTPATIENT
Start: 2017-03-25 | End: 2017-03-30 | Stop reason: HOSPADM

## 2017-03-25 RX ORDER — MAGNESIUM SULFATE 100 %
4 CRYSTALS MISCELLANEOUS AS NEEDED
Status: DISCONTINUED | OUTPATIENT
Start: 2017-03-25 | End: 2017-03-30 | Stop reason: HOSPADM

## 2017-03-25 RX ORDER — HYDRALAZINE HYDROCHLORIDE 20 MG/ML
10 INJECTION INTRAMUSCULAR; INTRAVENOUS
Status: DISCONTINUED | OUTPATIENT
Start: 2017-03-25 | End: 2017-03-30 | Stop reason: HOSPADM

## 2017-03-25 RX ORDER — SENNOSIDES 8.6 MG/1
1 TABLET ORAL DAILY
Status: DISCONTINUED | OUTPATIENT
Start: 2017-03-25 | End: 2017-03-30 | Stop reason: HOSPADM

## 2017-03-25 RX ORDER — VANCOMYCIN HYDROCHLORIDE
1250
Status: DISCONTINUED | OUTPATIENT
Start: 2017-03-26 | End: 2017-03-25

## 2017-03-25 RX ORDER — SODIUM CHLORIDE 0.9 % (FLUSH) 0.9 %
5-10 SYRINGE (ML) INJECTION AS NEEDED
Status: DISCONTINUED | OUTPATIENT
Start: 2017-03-25 | End: 2017-03-30 | Stop reason: HOSPADM

## 2017-03-25 RX ORDER — SODIUM CHLORIDE 0.9 % (FLUSH) 0.9 %
5-10 SYRINGE (ML) INJECTION EVERY 8 HOURS
Status: DISCONTINUED | OUTPATIENT
Start: 2017-03-25 | End: 2017-03-30 | Stop reason: HOSPADM

## 2017-03-25 RX ADMIN — TRIAMCINOLONE ACETONIDE: 1 CREAM TOPICAL at 18:11

## 2017-03-25 RX ADMIN — ACETAMINOPHEN 650 MG: 325 TABLET, FILM COATED ORAL at 18:00

## 2017-03-25 RX ADMIN — UMECLIDINIUM 1 PUFF: 62.5 AEROSOL, POWDER ORAL at 13:26

## 2017-03-25 RX ADMIN — HYDRALAZINE HYDROCHLORIDE 10 MG: 20 INJECTION INTRAMUSCULAR; INTRAVENOUS at 18:00

## 2017-03-25 RX ADMIN — LEVOFLOXACIN 750 MG: 5 INJECTION, SOLUTION INTRAVENOUS at 11:41

## 2017-03-25 RX ADMIN — LACTULOSE 10 G: 10 SOLUTION ORAL at 10:35

## 2017-03-25 RX ADMIN — Medication 10 ML: at 10:37

## 2017-03-25 RX ADMIN — HYDROMORPHONE HYDROCHLORIDE 1 MG: 1 INJECTION, SOLUTION INTRAMUSCULAR; INTRAVENOUS; SUBCUTANEOUS at 04:26

## 2017-03-25 RX ADMIN — Medication 10 ML: at 22:08

## 2017-03-25 RX ADMIN — IOPAMIDOL 100 ML: 755 INJECTION, SOLUTION INTRAVENOUS at 00:57

## 2017-03-25 RX ADMIN — LACTULOSE 10 G: 10 SOLUTION ORAL at 17:54

## 2017-03-25 RX ADMIN — SODIUM CHLORIDE 50 ML/HR: 900 INJECTION, SOLUTION INTRAVENOUS at 00:57

## 2017-03-25 RX ADMIN — HYDRALAZINE HYDROCHLORIDE 10 MG: 20 INJECTION INTRAMUSCULAR; INTRAVENOUS at 22:09

## 2017-03-25 RX ADMIN — HYDROCODONE BITARTRATE AND ACETAMINOPHEN 1 TABLET: 10; 325 TABLET ORAL at 15:04

## 2017-03-25 RX ADMIN — HYDROCODONE BITARTRATE AND ACETAMINOPHEN 1 TABLET: 10; 325 TABLET ORAL at 22:09

## 2017-03-25 RX ADMIN — SENNOSIDES 8.6 MG: 8.6 TABLET, FILM COATED ORAL at 10:34

## 2017-03-25 RX ADMIN — VANCOMYCIN HYDROCHLORIDE 2000 MG: 10 INJECTION, POWDER, LYOPHILIZED, FOR SOLUTION INTRAVENOUS at 08:56

## 2017-03-25 RX ADMIN — SODIUM CHLORIDE 250 ML: 900 INJECTION, SOLUTION INTRAVENOUS at 13:29

## 2017-03-25 RX ADMIN — SODIUM CHLORIDE 40 MG: 9 INJECTION INTRAMUSCULAR; INTRAVENOUS; SUBCUTANEOUS at 21:00

## 2017-03-25 RX ADMIN — Medication 10 ML: at 13:27

## 2017-03-25 RX ADMIN — SODIUM CHLORIDE 40 MG: 9 INJECTION INTRAMUSCULAR; INTRAVENOUS; SUBCUTANEOUS at 10:35

## 2017-03-25 RX ADMIN — Medication 10 ML: at 00:57

## 2017-03-25 RX ADMIN — CEFTRIAXONE 1 G: 1 INJECTION, POWDER, FOR SOLUTION INTRAMUSCULAR; INTRAVENOUS at 03:12

## 2017-03-25 NOTE — ED NOTES
TRANSFER - OUT REPORT:    Verbal report given to ISAK Mcdermott on [place]  being transferred to [facility] for routine progression of care       Report consisted of patients Situation, Background, Assessment and   Recommendations(SBAR). Information from the following report(s) SBAR, ED Summary, [facility] and Recent Results was reviewed with the receiving nurse. Lines:   Peripheral IV 03/24/17 Left Forearm (Active)   Site Assessment Clean, dry, & intact 3/24/2017 11:36 PM   Phlebitis Assessment 0 3/24/2017 11:36 PM   Infiltration Assessment 0 3/24/2017 11:36 PM   Dressing Status Clean, dry, & intact 3/24/2017 11:36 PM   Dressing Type Tape;Transparent 3/24/2017 11:36 PM   Hub Color/Line Status Pink 3/24/2017 11:36 PM       Peripheral IV 03/25/17 Right Forearm (Active)   Site Assessment Clean, dry, & intact 3/25/2017 12:24 AM   Phlebitis Assessment 0 3/25/2017 12:24 AM   Infiltration Assessment 0 3/25/2017 12:24 AM   Dressing Status Clean, dry, & intact 3/25/2017 12:24 AM   Dressing Type Transparent;Tape 3/25/2017 12:24 AM   Hub Color/Line Status Pink;Flushed 3/25/2017 12:24 AM        Opportunity for questions and clarification was provided.       Patient transported with:   [facility]

## 2017-03-25 NOTE — PROGRESS NOTES
Hospitalist Progress Note    NAME: Perry Moreno   :  1955   MRN:  [id number]       Interim Hospital Summary: 57 y.o. female whom presented on 3/24/2017 with      Assessment / Plan:  Liver Cirrhosis  Chronic Hep C  Acute blood loss anemia  -pt states she was treated for hep c years ago by Dr Corrie Brandon  -trend H/H, transfuse for hgb<7  -s/p 3 units prbc  -continue IVF  -hold antihypertensive meds due to risk of hemodynamic instability  -NPO for GI evaluation    Hx of HIV and AIDs  -not on meds, diagnosed 20 yrs ago at [facility]. Had treatment for sometime at [facility], however lost to follow up and had not been on antiretroviral meds for years.  -she is at risk for opportunistic infections  -will order CD4 count, viral load, hepatitis/C panel, RPR  -cxr neg   -ID consulted in the ED    B/l LE rash with painful ulcer likely related to erythema nodosum vs cryoglobulinemia related to chronic Hep C  -does not appear to be cellulitis, so will discontinue abx  -could tx with NSAID vs steroid however, given current GI bleed, will hold off  -will order SOLOMON, RF, CRP, SED, compliments  -will consult rheum for further assistant    T2DM  -check A1C  -SSI while NPO tonight    COPD   -stable  -cont' home inhalers      Code status: Full  Prophylaxis: scds  Recommended Disposition: Home w/Family     Subjective:     Chief Complaint / Reason for Physician Visit  Pt seen and examined at bedside. Has leg pain. Cannot provide much hx about her chronic medical conditions. Complains of leg pain. Received 2/3 units prbc thus far. Discussed with RN events overnight.      Review of Systems:  Symptom Y/N Comments  Symptom Y/N Comments   Fever/Chills n   Chest Pain n    Poor Appetite    Edema y    Cough n   Abdominal Pain n    Sputum n   Joint Pain y    SOB/HUNTLEY n   Pruritis/Rash y Both legs   Nausea/vomit    Tolerating PT/OT     Diarrhea n   Tolerating Diet     Constipation    Other       Could NOT obtain due to:      Objective: VITALS:   Last 24hrs VS reviewed since prior progress note.  Most recent are:  Patient Vitals for the past 24 hrs:   Temp Pulse Resp BP SpO2   03/25/17 1227 98.4 °F (36.9 °C) (!) 103 17 146/70 99 %   03/25/17 0818 98.2 °F (36.8 °C) 98 18 133/65 99 %   03/25/17 0715 - (!) 105 18 167/72 100 %   03/25/17 0700 99.3 °F (37.4 °C) (!) 103 19 158/69 98 %   03/25/17 0645 - 99 14 152/77 96 %   03/25/17 0637 98.3 °F (36.8 °C) 100 13 146/62 96 %   03/25/17 0636 - (!) 103 18 146/62 97 %   03/25/17 0616 - (!) 102 14 148/88 97 %   03/25/17 0600 - (!) 103 15 146/73 97 %   03/25/17 0545 - (!) 103 13 139/67 97 %   03/25/17 0541 98.8 °F (37.1 °C) (!) 104 12 139/67 96 %   03/25/17 0530 - (!) 112 15 155/78 95 %   03/25/17 0515 - (!) 105 13 155/80 96 %   03/25/17 0511 99.1 °F (37.3 °C) (!) 101 12 155/80 97 %   03/25/17 0500 - (!) 109 20 157/68 98 %   03/25/17 0445 - (!) 108 15 144/81 98 %   03/25/17 0441 99 °F (37.2 °C) (!) 110 17 144/81 97 %   03/25/17 0430 - (!) 114 17 163/73 98 %   03/25/17 0427 98.6 °F (37 °C) (!) 112 15 163/73 97 %   03/25/17 0415 - (!) 112 16 151/74 97 %   03/25/17 0412 98.6 °F (37 °C) (!) 112 26 162/73 98 %   03/25/17 0400 - (!) 108 19 148/70 98 %   03/25/17 0357 98.9 °F (37.2 °C) (!) 106 14 147/68 96 %   03/25/17 0337 98.8 °F (37.1 °C) (!) 106 13 145/69 96 %   03/25/17 0330 - (!) 108 16 149/68 97 %   03/25/17 0315 98.9 °F (37.2 °C) (!) 106 13 148/69 96 %   03/25/17 0300 - (!) 107 14 146/74 97 %   03/25/17 0245 - (!) 109 17 145/70 98 %   03/25/17 0230 - (!) 120 20 162/64 95 %   03/25/17 0220 98.9 °F (37.2 °C) (!) 109 17 152/76 97 %   03/25/17 0215 - (!) 106 14 147/67 97 %   03/25/17 0200 - (!) 109 15 143/71 98 %   03/25/17 0145 - (!) 106 16 153/72 94 %   03/25/17 0130 - (!) 107 13 131/75 97 %   03/25/17 0119 99.3 °F (37.4 °C) (!) 106 13 139/63 99 %   03/25/17 0115 - (!) 107 14 138/69 99 %   03/25/17 0100 - (!) 109 14 144/69 98 %   03/25/17 0057 - - - 151/67 -   03/25/17 0039 - (!) 108 14 - 99 %   03/25/17 0019 99 °F (37.2 °C) (!) 108 14 135/62 100 %   03/25/17 0016 - (!) 107 13 135/62 100 %   03/25/17 0000 - (!) 110 14 135/62 99 %   03/24/17 2345 98.9 °F (37.2 °C) (!) 119 15 138/72 98 %   03/24/17 2330 - (!) 107 14 133/63 99 %   03/24/17 2329 98.5 °F (36.9 °C) (!) 107 13 129/62 97 %   03/24/17 2315 - (!) 106 14 134/61 99 %   03/24/17 2300 - (!) 108 13 134/71 98 %   03/24/17 2245 - (!) 107 14 134/65 100 %   03/24/17 2230 - (!) 107 14 124/63 99 %   03/24/17 2200 - (!) 106 16 - 99 %   03/24/17 2145 - (!) 107 13 - 99 %   03/24/17 2130 99.5 °F (37.5 °C) - - - -   03/24/17 2119 - (!) 115 17 - 100 %   03/24/17 2117 - - - 155/70 -   03/24/17 1911 98.9 °F (37.2 °C) (!) 119 16 143/59 98 %       Intake/Output Summary (Last 24 hours) at 03/25/17 1337  Last data filed at 03/25/17 1055   Gross per 24 hour   Intake           1008.8 ml   Output              350 ml   Net            658.8 ml        PHYSICAL EXAM:  General: WD, WN. Alert, cooperative, no acute distress    EENT:  EOMI. Anicteric sclerae. MMM  Resp:  CTA bilaterally, no wheezing or rales. No accessory muscle use  CV:  Regular  rhythm,  No edema  GI:  Soft, Non distended, Non tender.  +Bowel sounds  Neurologic:  Alert and oriented X 3, normal speech,   Psych:   Good insight. Not anxious nor agitated  Skin:  Nonblanchable rash on b/l LE R>L, multiple ulcers on LE in different stage of healing. Scarring on L leg from large previous ulcer. RLE ulcer painful on palpation. No drainage, erythema seen on legs other than rash.     Reviewed most current lab test results and cultures  YES  Reviewed most current radiology test results   YES  Review and summation of old records today    NO  Reviewed patient's current orders and MAR    YES  PMH/SH reviewed - no change compared to H&P  ________________________________________________________________________  Care Plan discussed with:    Comments   Patient x    Family      RN x    Care Manager     Consultant Multidiciplinary team rounds were held today with , nursing, pharmacist and clinical coordinator. Patient's plan of care was discussed; medications were reviewed and discharge planning was addressed. ________________________________________________________________________  Total NON critical care TIME:  45   Minutes    Total CRITICAL CARE TIME Spent:   Minutes non procedure based      Comments   >50% of visit spent in counseling and coordination of care x    ________________________________________________________________________  Bassam Santiago MD     Procedures: see electronic medical records for all procedures/Xrays and details which were not copied into this note but were reviewed prior to creation of Plan. LABS:  I reviewed today's most current labs and imaging studies.   Pertinent labs include:  Recent Labs      03/25/17   1140  03/24/17 2027   WBC   --   3.7   HGB  6.6*  4.3*   HCT  22.3*  16.4*   PLT   --   166     Recent Labs      03/25/17   1140  03/24/17   2222  03/24/17 2027   NA  142   --   142   K  3.3*   --   3.5   CL  114*   --   113*   CO2  20*   --   18*   GLU  163*   --   135*   BUN  12   --   17   CREA  0.78   --   0.97   CA  7.1*   --   7.5*   MG   --    --   2.3   ALB   --    --   2.0*   TBILI   --    --   0.4   SGOT   --    --   69*   ALT   --    --   39   INR   --   1.4*   --        Signed: Bassam Santiago MD

## 2017-03-25 NOTE — PROGRESS NOTES
[address] in consult to MD Berta Anderson. MD stated he will see pt on Mon. If attending wants pt to be seen earlier than call MD Berta Anderson at [phone].

## 2017-03-25 NOTE — PROGRESS NOTES
Called in consult to MD Kayla Andrade per MD Rosalind Cummings. MD called back and stated that he would review pt's chart and give a call back. Waiting for call back.

## 2017-03-25 NOTE — ED NOTES
Pt resting in stretcher. Call bell within reach. Pt updated on plan of care. Vitals rechecked. Blood started. Np other complaints voiced at this time.

## 2017-03-25 NOTE — ED NOTES
Pt placed on bedpan for comfort measures. Obtained urine specimen. Vitals rechecked. CT called and advised is ready for scan. Will accompany pt to CT once arrived to transport for scanning.

## 2017-03-25 NOTE — PROGRESS NOTES
Pharmacy  Vancomycin Dosing     Pharmacy consulted to dose vancomycin for this 57 y.o. female being treated empirically ? Intra-abdominal infection?, H&P pending. Other Current Antimicrobials: none  Microbiology Results (Current encounter)   Date/Time Order Name Specimen Source Lab Status   3/24/17 2222 CULTURE, BLOOD -- In process   3/24/17 2217 CULTURE, BLOOD -- In process   3/24/17 2126 CULTURE, URINE -- In process      Height and Weight      Ht Readings from Last 1 Encounters:   03/24/17 170.2 cm (67\")          Wt Readings from Last 1 Encounters:   03/24/17 72.6 kg (160 lb)       Renal Function Creatinine Clearance (ml/min):58         Recent Labs      03/24/17 2027   CREA 0.97   BUN 17       WBC     Recent Labs      03/24/17 2027   WBC 3.7       Temp 98.3 °F (36.8 °C)      Plan:  Vancomycin 2000mg loading dose followed by 1250mg IV q16h for a projected trough at Css of 16. Vancomycin Trough Goal: 15 - 20  Pharmacy will follow daily and adjust as appropriate.        Thank you,  Missy Sanez, [facility]

## 2017-03-25 NOTE — H&P
[place] Kyree Mackenzieu, [address]   HISTORY AND PHYSICAL       Name:  Lakhwinder Andres   MR#:  [id number]   :  1955   Account #:  [de-identified]        Date of Adm:  2017       CHIEF COMPLAINT: Wounds breaking out on my legs. HISTORY OF PRESENT ILLNESS: The patient is a 50-year-old   [facility] female with past medical history of hepatitis C, not   treated, history of HIV, not on antiretroviral therapy. She also has a   history of COPD, hypertension, and diabetes. She presented to the   emergency department complaining of generalized abdominal pain that   has been ongoing for the past 1 week. She reports that has been   constipated for the past 4 days and the last 2 days, she developed   diarrhea of stools, which looked dark. She denies any passage of   bright red blood per rectum. She also tells me that she noticed a   wound on her right leg 2 days ago and yesterday there were red spots   developing on her feet. She tells me that her legs are also swollen and   she has not been able to ambulate. She also reports that her abdomen   is distended with urinary frequency for the past 2 days. So based on all   of these, she decided to come to the emergency department for   evaluation. Of note, she denies any associated history of nausea,   vomiting, no cough or fever. Today, here in the emergency department, hematology was done and   she was found to have hemoglobin of 4.3, with MCV of 59.2. Chemistry   was basically unremarkable with lactic acid of 1.0. She had a CT scan   of the abdomen and pelvis done, which showed somewhat nodular   liver, likely related to cirrhosis. The spleen is enlarged with mild to   moderate amount of ascites. She is scheduled to be transfused with at   least 3 units of packed red blood cells and the hospitalist service were   subsequently consulted for hospital admission for evaluation.     RECENT HOSPITALIZATION: None. PAST MEDICAL HISTORY:   1. History of human immunodeficiency virus AIDS. She has not been   on antiretroviral therapy for a long time. 2. History of diabetes mellitus type 2.   3. Hypertension. 4. Asthma/COPD. 5. Degenerative joint disease. 6. History of chronic hepatitis C. PAST SURGICAL HISTORY:   1. She is status post dilatation and curette. 2. Status post partial hysterectomy. 3. Status post pins and screws in the left leg. 4. Status post tubal ligation. HOME MEDICATIONS: Include. 1. Albuterol 2 puffs via inhalation as needed for wheezing. 2. Norvasc 10 mg p.o. daily. 3. Folic acid 9732 mg every other day. 4. Advair Diskus 250/50 mcg one puff via inhalation 2 times a day. 5. Norco 7.5/325 mg every 8 hours. 6. Spiriva HandiHaler 18 mcg once a day. ALLERGIES:   1. CODEINE. 2. PENICILLIN. 3. BEE POLLEN AND EXTRACT. FAMILY HISTORY: Her parents have both diabetes and hypertension. SOCIAL HISTORY: She lives with her daughter. She smokes about 1   pack of cigarettes for a week. She denies illicit drug use. She denies   alcohol use. REVIEW OF SYSTEMS   CONSTITUTIONAL: She denies any fever, chills, or rigors. Positive   malaise with generalized body weakness. Positive easy fatigability. She denies any unintentional weight loss. EYES: No visual disturbances. RESPIRATORY: She denies any shortness of breath. No cough, no   hemoptysis. CARDIAC: No chest pain, palpitations, orthopnea or paroxysmal   nocturnal dyspnea. GASTROINTESTINAL: Abdomen is soft with normoactive bowel   sounds, also distended. No hepatomegaly. The tip of spleen can be felt   just below the left costal margin. No CVA tenderness. EXTREMITIES: She has bilateral petechia hemorrhages on both feet   and she also has a small wound about 2 x 2 cm just lateral to the right   lower extremity. She has bilateral pitting pedal edema.    NEUROLOGIC: She is alert and awake, but looks very anxious. She is   moving all her extremities. LABORATORY EVALUATION: White blood cell count is 3.7,   hemoglobin 4.3, platelets 523. Chemistry: Sodium 142, potassium 3.5,   chloride 113, glucose 135, BUN 17, creatinine 0.97. Bilirubin: Albumin   2.0, ALT 39, AST 59, alkaline phosphatase 130. Troponin is less than   0.04. EKG shows sinus tachycardia at 104 beats per minute with low-voltage   QRS. ASSESSMENT AND PLAN: The patient is a 40-year-old female who   presented to the emergency department complaining of generalized   abdominal pain with constipation and wounds on her right lower leg. She was found to be anemic and here in the emergency department. 1. Severe anemia with hemoglobin on admission of 4.3. I suspect this   is secondary to chronic gastrointestinal bleeding. Of note, she denies   any hemoptysis. Based on probably history of liver cirrhosis and   esophageal variceal chronic bleeding a possibility. She is being   transfused with 3 units of packed red blood cells. We will followup with   post-transfusion and CBC. 2. Liver cirrhosis secondary to chronic hepatitis C. She does not appear to be in any hepatic encephalopathy. Based   on constipation, will empirically start her on lactulose 15 grams twice   daily. We will consult GI/Hepatology for evaluation. 3. Right lower extremity wound with surrounding cellulitis. We will   empirically start on vancomycin, Pharmacy to dose. We will consult Wound Care. 4. History of human immunodeficiency virus and AIDS. She has not   been compliant with antiretroviral therapy. Will check CD4 count. We   will also consult Infectious Disease for evaluation. 5. Possible gastrointestinal bleeding, suspect upper gastrointestinal   from esophageal bleeding, esophageal varices. We will consult GI for   possible EGD. 6. History of diabetes mellitus type 2. Blood sugar is currently stable.    We will initiate the insulin and sliding scale as per protocol. DISPOSITION: The patient will be admitted to the medical floor for   routine progression of care. A total of about 65 minutes was used in   seeing patient.         Romero Jones MD      NA / CD   D:  03/25/2017   05:55   T:  03/25/2017   08:10   Job #:  [id number]

## 2017-03-25 NOTE — ED NOTES
RN attempted to call report to Gen Surg with no answer again. Charge nurse made aware. Will call back.

## 2017-03-26 LAB
ABO + RH BLD: NORMAL
ANION GAP BLD CALC-SCNC: 10 MMOL/L (ref 5–15)
BACTERIA SPEC CULT: NORMAL
BASOPHILS # BLD AUTO: 0 X10E3/UL (ref 0–0.2)
BASOPHILS NFR BLD AUTO: 1 %
BLD PROD TYP BPU: NORMAL
BLOOD GROUP ANTIBODIES SERPL: NORMAL
BPU ID: NORMAL
BUN SERPL-MCNC: 8 MG/DL (ref 6–20)
BUN/CREAT SERPL: 10 (ref 12–20)
CALCIUM SERPL-MCNC: 7.2 MG/DL (ref 8.5–10.1)
CC UR VC: NORMAL
CD3+CD4+ CELLS # BLD: 363 /UL (ref 359–1519)
CD3+CD4+ CELLS NFR BLD: 27.9 % (ref 30.8–58.5)
CD3+CD4+ CELLS/CD3+CD8+ CLL BLD: 0.49 % (ref 0.92–3.72)
CD3+CD8+ CELLS # BLD: 745 /UL (ref 109–897)
CD3+CD8+ CELLS NFR BLD: 57.3 % (ref 12–35.5)
CHLORIDE SERPL-SCNC: 113 MMOL/L (ref 97–108)
CO2 SERPL-SCNC: 19 MMOL/L (ref 21–32)
CREAT SERPL-MCNC: 0.77 MG/DL (ref 0.55–1.02)
CROSSMATCH RESULT,%XM: NORMAL
EOSINOPHIL # BLD AUTO: 0.1 X10E3/UL (ref 0–0.4)
EOSINOPHIL NFR BLD AUTO: 2 %
ERYTHROCYTE [DISTWIDTH] IN BLOOD BY AUTOMATED COUNT: 21.3 % (ref 12.3–15.4)
GLUCOSE BLD STRIP.AUTO-MCNC: 120 MG/DL (ref 65–100)
GLUCOSE BLD STRIP.AUTO-MCNC: 127 MG/DL (ref 65–100)
GLUCOSE BLD STRIP.AUTO-MCNC: 185 MG/DL (ref 65–100)
GLUCOSE BLD STRIP.AUTO-MCNC: 216 MG/DL (ref 65–100)
GLUCOSE BLD STRIP.AUTO-MCNC: 225 MG/DL (ref 65–100)
GLUCOSE SERPL-MCNC: 166 MG/DL (ref 65–100)
HCT VFR BLD AUTO: 20.5 % (ref 34–46.6)
HCT VFR BLD AUTO: 25.2 % (ref 35–47)
HCT VFR BLD AUTO: 27.6 % (ref 35–47)
HGB BLD-MCNC: 6.1 G/DL (ref 11.1–15.9)
HGB BLD-MCNC: 7.7 G/DL (ref 11.5–16)
HGB BLD-MCNC: 8.3 G/DL (ref 11.5–16)
IMM GRANULOCYTES # BLD: 0 X10E3/UL (ref 0–0.1)
IMM GRANULOCYTES NFR BLD: 0 %
LYMPHOCYTES # BLD AUTO: 1.3 X10E3/UL (ref 0.7–3.1)
LYMPHOCYTES NFR BLD AUTO: 35 %
MCH RBC QN AUTO: 21.7 PG (ref 26.6–33)
MCHC RBC AUTO-ENTMCNC: 29.8 G/DL (ref 31.5–35.7)
MCV RBC AUTO: 73 FL (ref 79–97)
MONOCYTES # BLD AUTO: 0.5 X10E3/UL (ref 0.1–0.9)
MONOCYTES NFR BLD AUTO: 14 %
NEUTROPHILS # BLD AUTO: 1.8 X10E3/UL (ref 1.4–7)
NEUTROPHILS NFR BLD AUTO: 48 %
PLATELET # BLD AUTO: 152 X10E3/UL (ref 150–379)
POTASSIUM SERPL-SCNC: 3.3 MMOL/L (ref 3.5–5.1)
RBC # BLD AUTO: 2.81 X10E6/UL (ref 3.77–5.28)
SERVICE CMNT-IMP: ABNORMAL
SERVICE CMNT-IMP: NORMAL
SODIUM SERPL-SCNC: 142 MMOL/L (ref 136–145)
SPECIMEN EXP DATE BLD: NORMAL
STATUS OF UNIT,%ST: NORMAL
UNIT DIVISION, %UDIV: 0
WBC # BLD AUTO: 3.7 X10E3/UL (ref 3.4–10.8)

## 2017-03-26 PROCEDURE — 74011250636 HC RX REV CODE- 250/636: Performed by: HOSPITALIST

## 2017-03-26 PROCEDURE — 80048 BASIC METABOLIC PNL TOTAL CA: CPT | Performed by: INTERNAL MEDICINE

## 2017-03-26 PROCEDURE — C9113 INJ PANTOPRAZOLE SODIUM, VIA: HCPCS | Performed by: HOSPITALIST

## 2017-03-26 PROCEDURE — 36415 COLL VENOUS BLD VENIPUNCTURE: CPT | Performed by: INTERNAL MEDICINE

## 2017-03-26 PROCEDURE — 82962 GLUCOSE BLOOD TEST: CPT

## 2017-03-26 PROCEDURE — 74011000258 HC RX REV CODE- 258: Performed by: INTERNAL MEDICINE

## 2017-03-26 PROCEDURE — 74011000250 HC RX REV CODE- 250: Performed by: HOSPITALIST

## 2017-03-26 PROCEDURE — 65270000029 HC RM PRIVATE

## 2017-03-26 PROCEDURE — 74011250636 HC RX REV CODE- 250/636: Performed by: INTERNAL MEDICINE

## 2017-03-26 PROCEDURE — 85018 HEMOGLOBIN: CPT | Performed by: INTERNAL MEDICINE

## 2017-03-26 PROCEDURE — 74011250637 HC RX REV CODE- 250/637: Performed by: HOSPITALIST

## 2017-03-26 PROCEDURE — 74011250637 HC RX REV CODE- 250/637: Performed by: INTERNAL MEDICINE

## 2017-03-26 RX ORDER — LANOLIN ALCOHOL/MO/W.PET/CERES
1 CREAM (GRAM) TOPICAL
Status: DISCONTINUED | OUTPATIENT
Start: 2017-03-27 | End: 2017-03-28

## 2017-03-26 RX ADMIN — Medication 10 ML: at 06:41

## 2017-03-26 RX ADMIN — HYDROCODONE BITARTRATE AND ACETAMINOPHEN 1 TABLET: 10; 325 TABLET ORAL at 17:56

## 2017-03-26 RX ADMIN — HYDROCODONE BITARTRATE AND ACETAMINOPHEN 1 TABLET: 10; 325 TABLET ORAL at 10:09

## 2017-03-26 RX ADMIN — Medication 10 ML: at 22:26

## 2017-03-26 RX ADMIN — UMECLIDINIUM 1 PUFF: 62.5 AEROSOL, POWDER ORAL at 09:47

## 2017-03-26 RX ADMIN — CEFTRIAXONE 1 G: 1 INJECTION, POWDER, FOR SOLUTION INTRAMUSCULAR; INTRAVENOUS at 09:49

## 2017-03-26 RX ADMIN — SODIUM CHLORIDE 40 MG: 9 INJECTION INTRAMUSCULAR; INTRAVENOUS; SUBCUTANEOUS at 22:25

## 2017-03-26 RX ADMIN — SENNOSIDES 8.6 MG: 8.6 TABLET, FILM COATED ORAL at 09:50

## 2017-03-26 RX ADMIN — TRIAMCINOLONE ACETONIDE: 1 CREAM TOPICAL at 17:50

## 2017-03-26 RX ADMIN — TRIAMCINOLONE ACETONIDE: 1 CREAM TOPICAL at 09:47

## 2017-03-26 RX ADMIN — SODIUM CHLORIDE 40 MG: 9 INJECTION INTRAMUSCULAR; INTRAVENOUS; SUBCUTANEOUS at 09:49

## 2017-03-26 RX ADMIN — LACTULOSE 10 G: 10 SOLUTION ORAL at 09:50

## 2017-03-26 RX ADMIN — Medication 10 ML: at 16:00

## 2017-03-26 RX ADMIN — INSULIN LISPRO 1 UNITS: 100 INJECTION, SOLUTION INTRAVENOUS; SUBCUTANEOUS at 00:17

## 2017-03-26 NOTE — PROGRESS NOTES
Hospitalist Progress Note    NAME: Niko Frank   :  1955   MRN:  [id number]       Interim Hospital Summary: 57 y.o. female whom presented on 3/24/2017 with      Assessment / Plan:  Liver Cirrhosis  Chronic Hep C  Acute blood loss anemia  ? SBP  -pt states she was treated for hep c years ago by Dr Edinson Velasco  -s/p 3 units prbc  -trend H/H, transfuse for hgb<7   -hold antihypertensive meds due to risk of hemodynamic instability  -for paracentesis today    Hx of HIV and AIDs  -not on meds, diagnosed 20 yrs ago at [facility]. Had treatment for sometime at [address] [place] [address], however lost to follow up and had not been on antiretroviral meds for years.  -she is at risk for opportunistic infections  -follow CD4 count, viral load, hepatitis/C panel, RPR  -cxr neg   -ID consulted in the ED    UTI  -cont' rocephin  -follow urine cx    B/l LE rash with painful ulcer likely related to erythema nodosum vs cryoglobulinemia related to chronic Hep C  -does not appear to be cellulitis, so will discontinue abx  -could tx with NSAID vs steroid however, given significant anemia likely a result of chronic slow GI bleed, will hold off  -RF/CRP neg, SED 85  -f/u with SOLOMON, compliments  -will consult rheum for further assistant    T2DM  -A1C 3.5  -SSI while NPO tonight    COPD   -stable  -cont' home inhalers      Code status: Full  Prophylaxis: scds  Recommended Disposition: Home w/Family     Subjective:     Chief Complaint / Reason for Physician Visit  Pt seen and examined at bedside. No acute complaints. Discussed with RN events overnight.      Review of Systems:  Symptom Y/N Comments  Symptom Y/N Comments   Fever/Chills n   Chest Pain n    Poor Appetite    Edema y    Cough n   Abdominal Pain n    Sputum n   Joint Pain y    SOB/HUNTLEY n   Pruritis/Rash y Both legs   Nausea/vomit    Tolerating PT/OT     Diarrhea n   Tolerating Diet     Constipation    Other       Could NOT obtain due to:      Objective:     VITALS:   Last 24hrs VS reviewed since prior progress note. Most recent are:  Patient Vitals for the past 24 hrs:   Temp Pulse Resp BP SpO2   03/26/17 0734 98.9 °F (37.2 °C) 93 18 150/75 97 %   03/26/17 0400 98.4 °F (36.9 °C) 98 18 - 97 %   03/25/17 1935 98.9 °F (37.2 °C) (!) 109 16 166/79 97 %   03/25/17 1751 99.3 °F (37.4 °C) (!) 108 16 178/80 97 %   03/25/17 1646 99.1 °F (37.3 °C) (!) 103 16 178/75 98 %   03/25/17 1551 98.6 °F (37 °C) (!) 102 20 159/71 99 %   03/25/17 1512 98.7 °F (37.1 °C) (!) 109 20 171/76 99 %   03/25/17 1457 98.9 °F (37.2 °C) (!) 107 20 164/74 98 %   03/25/17 1442 98.8 °F (37.1 °C) (!) 103 20 156/76 99 %   03/25/17 1420 98.8 °F (37.1 °C) (!) 104 16 153/75 99 %   03/25/17 1227 98.4 °F (36.9 °C) (!) 103 17 146/70 99 %       Intake/Output Summary (Last 24 hours) at 03/26/17 0930  Last data filed at 03/25/17 1935   Gross per 24 hour   Intake            936.5 ml   Output              750 ml   Net            186.5 ml        PHYSICAL EXAM:  General: WD, WN. Alert, cooperative, no acute distress    EENT:  EOMI. Anicteric sclerae. MMM  Resp:  CTA bilaterally, no wheezing or rales. No accessory muscle use  CV:  Regular  rhythm,  No edema  GI:  Soft, Non distended, Non tender.  +Bowel sounds  Neurologic:  Alert and oriented X 3, normal speech,   Psych:   Good insight. Not anxious nor agitated  Skin:  Nonblanchable rash on b/l LE R>L, multiple ulcers on LE in different stage of healing. Scarring on L leg from large previous ulcer. RLE ulcer painful on palpation. No drainage, erythema seen on legs other than rash.     Reviewed most current lab test results and cultures  YES  Reviewed most current radiology test results   YES  Review and summation of old records today    NO  Reviewed patient's current orders and MAR    YES  PMH/ reviewed - no change compared to H&P  ________________________________________________________________________  Care Plan discussed with:    Comments   Patient x    Family      RN x    Care Manager     Consultant Multidiciplinary team rounds were held today with , nursing, pharmacist and clinical coordinator. Patient's plan of care was discussed; medications were reviewed and discharge planning was addressed. ________________________________________________________________________  Total NON critical care TIME:  35   Minutes    Total CRITICAL CARE TIME Spent:   Minutes non procedure based      Comments   >50% of visit spent in counseling and coordination of care x    ________________________________________________________________________  Edna Head MD     Procedures: see electronic medical records for all procedures/Xrays and details which were not copied into this note but were reviewed prior to creation of Plan. LABS:  I reviewed today's most current labs and imaging studies.   Pertinent labs include:  Recent Labs      03/26/17   0300  03/25/17   1859  03/25/17   1140  03/25/17   0608  03/24/17 2027   WBC   --    --    --   3.7  3.7   HGB  7.7*  7.8*  6.6*  6.1*  4.3*   HCT  25.2*  25.3*  22.3*  20.5*  16.4*   PLT   --    --    --   152  166     Recent Labs      03/25/17   1140  03/24/17   2222  03/24/17 2027   NA  142   --   142   K  3.3*   --   3.5   CL  114*   --   113*   CO2  20*   --   18*   GLU  163*   --   135*   BUN  12   --   17   CREA  0.78   --   0.97   CA  7.1*   --   7.5*   MG   --    --   2.3   ALB   --    --   2.0*   TBILI   --    --   0.4   SGOT   --    --   69*   ALT   --    --   39   INR   --   1.4*   --        Signed: Edna Head MD

## 2017-03-26 NOTE — PROGRESS NOTES
ODIN Holloway [id number]                         [id number]       1955  Date/Time:                    3/26/2017 7:21 AM   female  57 y.o. Assessment: complex medical problem set with severity index requiring assessment of all medical problems with discussion with consultants and attending physician :  SBP  Patient Active Problem List    Diagnosis Date Noted    Generalized abdominal pain 03/25/2017    Severe anemia Multifactorial stools are HEM NEG   Bone marrow suppression , maybe chronic gi blood loss , with hx of cirrhosis need exclusion of esophageal varices ,   Most concerned about SBP  And possible HCC  Will get afp , IR to do paracentesis for wbc cultures including tb   Has marco antonio jarrett b meld 10 6% 3 month mortality  Agree with id evaluation   No need for urgent gi studies     Note nh3 was elevated no asterixis or change in her mental status     Multiple open wounds of lower leg     Boils     Chronic airway obstruction, not elsewhere classified ([address] Stable     COPD exacerbation ([address]     Degenerative Joint Disease     Degenerative Disc Disease     Chronic hepatitis C without mention of hepatic coma ([address]     Insomnia     Allergic rhinitis, cause unspecified     HIV (human immunodeficiency virus infection) ([address] Hepatitis profile , needs immunization for b and a if not done,tb screening     Pulmonary nodules     Diabetes mellitus type 2, controlled ([address]     Bruised ribs     Sprain and strain of unspecified site of shoulder and upper arm     Arthritis, Degenerative, Knee     HTN (hypertension)                                                      Plan:  1. Paracentesis for spb  2. afp hep c studies quantitative, hep profile ,   3. Screening for esophageal varices no urgent need  4.  Case management lots of social issues Risk of deterioration:   [x]       Low     [x]      Moderate     []     High                                               Time spent with patient:  []     15 mins                       []         25 mins                                                [x]      60 minutes                             []      Critical Care Provided                                       []      >50% of visit spent in                                          counseling                             and coordination of  care        Communication with   [x]        Patient                     []         Family                                           []   Care Manager         [x]        Nursing                   []   Specialist /PCP                                                       ___________________________________________________    Physician: Flor Baumann MD               Subjective:      INFORMANT  [x]  Patient   [x]  Family daughter    CHIEF COMPLAINT:  My stomach hurts     HISTORY OF PRESENT ILLNESS:      The patient is  a 57 y.o. BLACK OR  female who was admitted on 3/24/2017 with Severe anemia. The patient presented and I have been consulted for the evaluation and management of abd pain and cirrhosis  She was most recently seen gi wise at unknown gi office by [address] [place] [address] and 2 yeasrs ago at [facility]. She did not follow up with these appts. Onset of symptoms was gradual with gradually worsening course since that time. The pain is located in the lower abdomen and in the upper abdomen without radiation. Patient describes the pain as dull and cramping, continuous, and rated as moderate. Aggravating factors include eating and constipation . constipation has been on ongoing prior hx of narcotic use in the past and poor diet compliance with liquid and fiber. Alleviating factors include none.  Associated symptoms include mild nausea no active bleeding vomiting or rectal  Additional biliary/liver/pancreatic  symptoms denied include current etoh intake pruritis    pancreatitis, steatorrhea, pain radiating to the back and pancreatic trauma. There is history of NSAID use and pain meds and hep c  Untreated and hiv. There is no history of caustic chemical ingestion , past abdominal surgery  and family history of colon cancer. Evidence of portal hypertension: yes  Ascites: yes- needs large volume paracentesis  Current diuretic therapy: Off diuretics  SBP: no prior hx  Antibiotic Prophylaxis: none  Esophageal varices: unknown  History of variceal bleed: no  Prior banding: no  Number of banding sessions: 0  TIPS: No  Beta Blockers: none  Alpha-fetoprotein not recorded  Last imaging for liver: abnormal CT of abdomen ascites and cirrhosis                          Allergies   Allergen Reactions    Codeine Itching     Patient denies Codeine allergy    Penicillin V Hives     Patient denies penicillin allergy.      Pollen Extracts Runny Nose     Itchy, headaches, sneezing      Past Medical History:   Diagnosis Date    Allergic rhinitis, cause unspecified 7/21/2011    Arthritis 7/21/2011    Asthma     Burning with urination     Chronic hepatitis C without mention of hepatic coma ([facility] 7/21/2011    Degenerative Disc Disease 7/21/2011    Degenerative Joint Disease 7/21/2011    Diabetes ([facility]     Type II    HIV (human immunodeficiency virus infection) ([facility] 7/21/2011    Hypertension     Insomnia 7/21/2011    Multiple open wounds of lower leg 12/3/2015    Pulmonary nodules 7/21/2011       Past Surgical History:   Procedure Laterality Date    HX DILATION AND CURETTAGE  1970    HX GYN  1998    partial hystecomy    HX ORTHOPAEDIC      pins/screws in left leg    HX TUBAL LIGATION         Social History   Substance Use Topics    Smoking status: Current Every Day Smoker     Packs/day: 0.50     Years: 15.00     Types: Cigarettes    Smokeless tobacco: Never Used    Alcohol use No       Family History   Problem Relation Age of Onset    Diabetes Mother     Hypertension Mother     Diabetes Father        Prior to Admission medications    Medication Sig Start Date End Date Taking? Authorizing Provider   naproxen (NAPROSYN) 500 mg tablet Take 1 Tab by mouth every twelve (12) hours as needed for Pain. 10/26/16   Pam Brewster MD   amLODIPine (NORVASC) 10 mg tablet take 1 tablet by mouth once daily 10/26/16   Pam Brewster MD   insulin glargine (LANTUS) 100 unit/mL injection 10 units subcutaneous bid 10/26/16   Pam Brewster MD   HYDROcodone-acetaminophen [facility]) 7.5-325 mg per tablet Take 1 Tab by mouth every eight (8) hours as needed for Pain. Max Daily Amount: 3 Tabs. 10/26/16   Pam Brewster MD   ibuprofen (MOTRIN) 800 mg tablet take 1 tablet by mouth every 8 hours if needed for pain 9/5/16   Pam Brewster MD   oxyCODONE IR (ROXICODONE) 5 mg immediate release tablet Take 1 Tab by mouth every six (6) hours as needed for Pain (Patient must fill antibiotic in order to fill this.). Max Daily Amount: 20 mg. 8/11/16   NAZ Dill   Blood-Glucose Meter monitoring kit Monitor your blood sugar 3 times daily. 8/11/16   NAZ Dill   TRUEPLUS LANCETS 33 gauge [facility]  10/14/15   Historical Provider   triamcinolone (ARISTOCORT) 0.5 % topical cream Apply  to affected area two (2) times a day.  use thin layer 11/23/15   Ines Rudolph MD   glucose blood VI test strips (TRUETEST TEST STRIPS) strip Patient test once daily 11/6/15   MD Erick Vargas Treva WITH HANDIHALER 18 mcg inhalation capsule inhale the contents of one capsule in the handihaler once daily 10/25/15   Pam Brewster MD   Lancets misc As directed 10/13/15   Devon Davis PA-C   glucose blood VI test strips (BLOOD GLUCOSE TEST) strip As directed 10/13/15   Raven Davis PA-C   HUMALOG KWIKPEN 100 unit/mL kwikpen inject 10 units subcutaneously before BREAKFAST, LUNCH, AND DINNER 4/6/15   Maria Alejandra Rondon MD   Insulin Needles, Disposable, (BD INSULIN PEN NEEDLE UF MINI) 31 x 3/16 \" ndle Use as directed with insulin pen 2/9/15   Maria Alejandra Rondon MD   albuterol (PROVENTIL HFA, VENTOLIN HFA, PROAIR HFA) 90 mcg/actuation inhaler Take 2 puffs by inhalation every four (4) hours as needed for Wheezing. 1/15/15   Maria Alejandra Rondon MD   Nebulizer & Compressor machine 1 each by Does Not Apply route four (4) times daily as needed. 1/13/15   Maria Alejandra Rondon MD   fluticasone-salmeterol (ADVAIR) 250-50 mcg/dose diskus inhaler Take 1 Puff by inhalation two (2) times a day. 8/1/14   Maria Alejandra Rondon MD   omega-3 fatty acids-vitamin e (FISH OIL) 1,000 mg Cap Take 1 Cap by mouth every other day. Historical Provider   ascorbic acid (VITAMIN C) 1,000 mg tablet Take  by mouth every other day.     Historical Provider                                       REVIEW OF SYSTEMS                                           Total of 13 systems reviewed as follows                                                                                        SYSTEM RESPONSE ADDITIONAL COMMENTS             Constitutional: feels ill, fatigued and generally weak                                      Eyes:  denies, blurry vision, eye pain, spots before eyes, blind areas  []  GLUCOMA   Nose: Denies:   nasal congestion, nasal discharge    EARS:  Denies:   Discharge, tinnitus,  Decrease hearing     Throat Denies: sore throat, history of soreness, discoloration of the tongue    Respiratory:   no cough, shortness of breath, or wheezing  [x]  ASTHMA   []  COPD       Cardiovascular:    positive for fatigue, negative for chest pain, chest pressure/discomfort  [x]   HYPERTENSION   []  CAD   GI:   See h&p     Genitourinary:   Denies: dysuria,  frequency/urgency,  hematuria    []  RENAL FAILURE     []  CHRONIC KIDNEY DISEASE        Integument:   Denies: rash, itching, hives    Hematologic:   Denies: swollen lymph  nodes, bleeding, bruising    Musculoskel:   joint pain [x]    OSTEOARTHRITIS  []    RHEUMATOID ARTHRITIS [x]  Foot wounds   Neurological:  Denies: focal weakness, speech problems, loss of consciousness [x]   Neuropathy  []    CVA     Endocrine:            Denies: palpitations, skin changes, temperature intolerance                                                       [x]  DM type 2       []  HYPOTHYROID      Psychiatric:      Negative except for    []  Depression    [x]  Anxiety   []  Bipolar                                                     Objective:     VITALS:    Visit Vitals    /79 (BP 1 Location: Right arm, BP Patient Position: At rest)    Pulse 98    Temp 98.4 °F (36.9 °C)    Resp 18    Ht 5' 7\" (1.702 m)    Wt 72.6 kg (160 lb)    SpO2 97%    BMI 25.06 kg/m2      Temp (24hrs), Av.7 °F (37.1 °C), Min:98.2 °F (36.8 °C), Max:99.3 °F (37.4 °C)       O2 Device: Room air                                                  PHYSICAL EXAM:      General:  Alert, cooperative, mild abd distress, appears older than stated age. Head:  Normocephalic, without obvious abnormality, atraumatic. Ears:  Canals clear TM normal appearence   Eyes:  Conjunctivae           Normal []  Pallor [x]     corneas clear and pupils equal,   Nose:   Nares normal. No drainage or sinus tenderness. Mouth:    Throat:   Mouth:-   [x]  No Loose teeth   [x] missing teeth        [] Loose teeth   Finger opening: []1  []1.5 [] 2 [] 2.5 [x] 3 [] 3.5   [] 4    Mallampati:[] Class 1  [] Class 2 [x] Class 3   [] Class 4      Neck - supple,      [x] Full ROM [] Decreased ROM  [] Short Neck no significant adenopathy    Lips and tongue normal.  No Thrush  mucus membranes  Normal []     Dry [x]    Neck: Supple, symmetrical,  no adenopathy, thyroid: non tender no carotid bruit and no JVD.    Lymph nodes: Cervical, supraclavicular normal.   Lungs:   Clear to auscultation and percussion  Normal [x]     Rales             []      Rhonchi    [] Wheezing    [] . Chest wall:  No tenderness or deformity. No Accessory muscle use. Heart:  Regular rate and rhythm,  no murmur, rub or gallop. Pulses equal 3+ carotids,brachials,radials,femorals,   Abdomen: Bowel sounds are normalabnormal findings:  distended, shifting dullness, tenderness mild and moderate in the lower abdomen and in the upper abdomen   Extremities:  Extremities  wounds    Skin:  Texture, turgor decrease No rashes or lesions. Not Jaundiced    Neurologic: EOMs intact. No facial asymmetry. No aphasia or slurred speech. d strength, alert and oriented ,time, place, person    Psych: ? insight. anxious not agitated.                                                          Lab Data Reviewed:                                                                      CBC:      Recent Labs      03/26/17   0300  03/25/17   1859  03/25/17   1140  03/24/17 2027   WBC   --    --    --   3.7   RBC   --    --    --   2.37*   HGB  7.7*  7.8*  6.6*  4.3*   HCT  25.2*  25.3*  22.3*  16.4*   PLT   --    --    --   166   GRANS   --    --    --   55   LYMPH   --    --    --   32   EOS   --    --    --   2                                                                    CHEMISTRY:    Recent Labs      03/25/17   1140 03/24/17 2027   GLU  163*  135*   NA  142  142   K  3.3*  3.5   CL  114*  113*   CO2  20*  18*   BUN  12  17   CREA  0.78  0.97   CA  7.1*  7.5*   AGAP  8  11   BUCR  15  18   AP   --   130*   TP   --   8.1   ALB   --   2.0*   GLOB   --   6.1*   AGRAT   --   0.3*                                                            LIVER ENZYMES:                                 Recent Labs      03/24/17 2027   TP  8.1   ALB  2.0*   AP  130*   SGOT  69*                                                           Coagulation Profile                                                 Recent Labs      03/24/17 2222   INR  1.4*                                                       INFLAMMATION STUDIES: Lab Results   Component Value Date/Time    Sed rate (ESR) 57 11/28/2012 05:45 PM    C-Reactive protein <0.29 03/25/2017 06:59 PM    C-Reactive protein <0.29 03/31/2015 01:40 AM                                                                                                                                     GI CALCULATORS    1. Steroids for alcoholic hepatitis      2.         Child - Iqbal Score  *Score: A= 5-6points B= 7-9points C= 10-15points   Parameters 1 pt 2 pt 3 pt   Bilirubin(mg/dl) <2.0 2.0-3.0 >3.0   Albumin(g/dl) >=3.5 2.8-3.4 <=2.7   Protime(INR) <=1.5 1.6-2.4 >=2.5   Ascites None Mild Moderate   Encephalopathy None 1-2 3-4     This patient's Child-Iqbal Score is : B    [email or web address]                                                    Meld score: 10   [email or web address]                                                                                                                                                                                     Physician: Maria Elena De Guzman MD

## 2017-03-26 NOTE — PROGRESS NOTES
End of Shift Nursing Note    Bedside shift change report given to Braden (oncoming nurse) by Daniel Flores (offgoing nurse). Report included the following information SBAR, Kardex, Procedure Summary, Intake/Output, MAR and Recent Results. Zone Phone:   [phone]    Significant changes during shift:   Hgb 8.3   Non-emergent issues for physician to address:   none     Number times ambulated in hallway past shift: 0      Number of times OOB to chair past shift: 0    POD #: n/a     Vital Signs:    Temp: 98.7 °F (37.1 °C)     Pulse (Heart Rate): 99     BP: 152/70     Resp Rate: 16     O2 Sat (%): 100 %    Lines & Drains:     Urinary Catheter? No     Central Line? No     PICC Line? No       NG tube [] in [] removed [x] not applicable   Drains [] in [] removed [x] not applicable     Skin Integrity:      Wounds: yes   Dressings Present: no    Wound Concerns: no      GI:    Current diet:  DIET CARDIAC Regular    Nausea: NO  Vomiting: NO  Bowel Sounds: YES  Flatus: YES  Last Bowel Movement: today     Respiratory:  Supplemental O2: No          Incentive Spirometer: YES  Volume: 1250  Coughing and Deep Breathing: YES  Oral Care: NO  Understanding (patient/family education): YES   Getting out of bed: YES  Head of bed elevation: YES    Patient Safety:    Falls Score: 3  Mobility Score: 1  Bed Alarm On? No  Sitter? No      Opportunity for questions and clarification was given to oncoming nurse. Patient bed is in low position, side rails are up x 2, door & observation blinds open as needed, call bell within reach and patient not in distress.     Perla Leone

## 2017-03-26 NOTE — PROGRESS NOTES
End of Shift Nursing Note    Bedside shift change report given to Braden (oncoming nurse) by Azalea Galeana (offgoing nurse). Report included the following information SBAR, Kardex, Intake/Output, MAR and Recent Results. Zone Phone:   [phone]    Significant changes during shift:    Hgb 7.8 after total of 3 transfusions. Non-emergent issues for physician to address:   none     Number times ambulated in hallway past shift: 0      Number of times OOB to chair past shift: 0    POD #: n/a     Vital Signs:    Temp: 99.3 °F (37.4 °C)     Pulse (Heart Rate): (!) 108     BP: 178/80     Resp Rate: 16     O2 Sat (%): 97 %    Lines & Drains:     Urinary Catheter? No     Central Line? No    PICC Line? No       NG tube [] in [] removed [x] not applicable   Drains [] in [] removed [x] not applicable     Skin Integrity:      Wounds: yes   Dressings Present: no    Wound Concerns: no      GI:    Current diet:  DIET REGULAR  DIET NPO    Nausea: NO  Vomiting: NO  Bowel Sounds: YES  Flatus: YES  Last Bowel Movement: several days ago     Respiratory:  Supplemental O2: No          Incentive Spirometer: YES  Volume: 1000  Coughing and Deep Breathing: YES  Oral Care: NO  Understanding (patient/family education): YES   Getting out of bed: NO  Head of bed elevation: YES    Patient Safety:    Falls Score: 3  Mobility Score: 1  Bed Alarm On? No  Sitter? No      Opportunity for questions and clarification was given to oncoming nurse. Patient bed is in low position, side rails are up x 2, door & observation blinds open as needed, call bell within reach and patient not in distress.     Shea Lynn

## 2017-03-26 NOTE — PROGRESS NOTES
End of Shift Nursing Note    Bedside shift change report given to Precious Clemente (oncoming nurse) by Janna Trujillo RN (offgoing nurse). Report included the following information SBAR, Kardex, MAR and Recent Results. Zone Phone:   [phone]    Significant changes during shift:    none   Non-emergent issues for physician to address:   none     Number times ambulated in hallway past shift: 0      Number of times OOB to chair past shift: 3     Vital Signs:    Temp: 98.9 °F (37.2 °C)     Pulse (Heart Rate): (!) 109     BP: 166/79     Resp Rate: 16     O2 Sat (%): 97 %    Lines & Drains:    NG tube [] in [] removed [x] not applicable   Drains [] in [] removed [x] not applicable     Skin Integrity:      Wounds: yes   Dressings Present: no    Wound Concerns: no      GI:    Current diet:  DIET NPO    Nausea: NO  Vomiting: NO  Bowel Sounds: YES  Flatus: YES  Last Bowel Movement: today   Appearance: Brown/ Semi-formed. Respiratory:  Supplemental O2: No       Incentive Spirometer: YES   Coughing and Deep Breathing: YES  Oral Care: YES  Understanding (patient/family education): NO   Getting out of bed: NO  Head of bed elevation: YES    Patient Safety:    Falls Score: 1  Mobility Score: 1  Bed Alarm On? No  Sitter? No      Opportunity for questions and clarification was given to oncoming nurse. Patient bed is in low position, side rails are up x 2, door & observation blinds open as needed, call bell within reach and patient not in distress.     Kenn Maldonado RN

## 2017-03-26 NOTE — PROGRESS NOTES
INTERVENTIONAL RADIOLOGY PROGRESS NOTE        Spoke with Dr. Katelynn Quezada. Only a small amount of fluid accessible in the lower quadrant on the left. Will plan to attempt diagnostic paracentesis tomorrow (Monday).         Jerardo Cat MD

## 2017-03-27 ENCOUNTER — APPOINTMENT (OUTPATIENT)
Dept: ULTRASOUND IMAGING | Age: 62
DRG: 283 | End: 2017-03-27
Attending: INTERNAL MEDICINE
Payer: MEDICAID

## 2017-03-27 LAB
ALBUMIN FLD-MCNC: 0.3 G/DL
AMYLASE FLD-CCNC: 49 U/L
ANION GAP BLD CALC-SCNC: 10 MMOL/L (ref 5–15)
APPEARANCE FLD: ABNORMAL
ASO AB SERPL-ACNC: 213 IU/ML (ref 0–200)
BASOPHILS # BLD AUTO: 0 K/UL (ref 0–0.1)
BASOPHILS # BLD AUTO: 0 X10E3/UL (ref 0–0.2)
BASOPHILS # BLD: 1 % (ref 0–1)
BASOPHILS NFR BLD AUTO: 0 %
BUN SERPL-MCNC: 8 MG/DL (ref 6–20)
BUN/CREAT SERPL: 12 (ref 12–20)
C4 SERPL-MCNC: 9 MG/DL (ref 14–44)
CALCIUM SERPL-MCNC: 7.4 MG/DL (ref 8.5–10.1)
CD3+CD4+ CELLS # BLD: 305 /UL (ref 359–1519)
CD3+CD4+ CELLS NFR BLD: 27.7 % (ref 30.8–58.5)
CD3+CD4+ CELLS/CD3+CD8+ CLL BLD: 0.44 % (ref 0.92–3.72)
CD3+CD8+ CELLS # BLD: 691 /UL (ref 109–897)
CD3+CD8+ CELLS NFR BLD: 62.8 % (ref 12–35.5)
CHLORIDE SERPL-SCNC: 113 MMOL/L (ref 97–108)
CO2 SERPL-SCNC: 18 MMOL/L (ref 21–32)
COLOR FLD: ABNORMAL
CREAT SERPL-MCNC: 0.67 MG/DL (ref 0.55–1.02)
DIFFERENTIAL METHOD BLD: ABNORMAL
EOSINOPHIL # BLD AUTO: 0 X10E3/UL (ref 0–0.4)
EOSINOPHIL # BLD: 0.1 K/UL (ref 0–0.4)
EOSINOPHIL NFR BLD AUTO: 1 %
EOSINOPHIL NFR BLD: 3 % (ref 0–7)
ERYTHROCYTE [DISTWIDTH] IN BLOOD BY AUTOMATED COUNT: 19.8 % (ref 11.5–14.5)
ERYTHROCYTE [DISTWIDTH] IN BLOOD BY AUTOMATED COUNT: 20.2 % (ref 12.3–15.4)
GLUCOSE FLD-MCNC: 141 MG/DL
GLUCOSE SERPL-MCNC: 108 MG/DL (ref 65–100)
HBV SURFACE AB SER QL: NONREACTIVE
HBV SURFACE AB SER-ACNC: <3.1 MIU/ML
HBV SURFACE AG SER QL: 0.1 INDEX
HBV SURFACE AG SER QL: <0.1 INDEX
HBV SURFACE AG SER QL: NEGATIVE
HBV SURFACE AG SER QL: NEGATIVE
HCT VFR BLD AUTO: 25.1 % (ref 35–47)
HCT VFR BLD AUTO: 25.8 % (ref 34–46.6)
HCT VFR BLD AUTO: 26.5 % (ref 35–47)
HCV AB SER IA-ACNC: >11.9 INDEX
HCV AB SERPL QL IA: REACTIVE
HCV COMMENT,HCGAC: ABNORMAL
HGB BLD-MCNC: 7.6 G/DL (ref 11.5–16)
HGB BLD-MCNC: 7.7 G/DL (ref 11.1–15.9)
HGB BLD-MCNC: 8 G/DL (ref 11.5–16)
HIV 1 & 2 AB SER-IMP: ABNORMAL
HIV 2 AB SERPL QL IA: NEGATIVE
HIV1 AB SERPL QL IA: POSITIVE
HIV1 P24 AG SERPL QL IA: NONREACTIVE
HIV1 RNA # SERPL NAA+PROBE: NORMAL COPIES/ML
HIV1 RNA SERPL NAA+PROBE-LOG#: 5.47 LOG10COPY/ML
HIV1+2 AB SERPL QL IA: REACTIVE
IGA SERPL-MCNC: 807 MG/DL (ref 70–400)
IGG SERPL-MCNC: 3460 MG/DL (ref 700–1600)
IGM SERPL-MCNC: 327 MG/DL (ref 40–230)
IMM GRANULOCYTES # BLD: 0 X10E3/UL (ref 0–0.1)
IMM GRANULOCYTES NFR BLD: 1 %
LDH SERPL L TO P-CCNC: 282 U/L (ref 81–246)
LYMPHOCYTES # BLD AUTO: 1.1 X10E3/UL (ref 0.7–3.1)
LYMPHOCYTES # BLD AUTO: 29 % (ref 12–49)
LYMPHOCYTES # BLD: 1.1 K/UL (ref 0.8–3.5)
LYMPHOCYTES NFR BLD AUTO: 26 %
LYMPHOCYTES NFR FLD: 70 %
MCH RBC QN AUTO: 22.2 PG (ref 26–34)
MCH RBC QN AUTO: 22.8 PG (ref 26.6–33)
MCHC RBC AUTO-ENTMCNC: 29.8 G/DL (ref 31.5–35.7)
MCHC RBC AUTO-ENTMCNC: 30.3 G/DL (ref 30–36.5)
MCV RBC AUTO: 73.4 FL (ref 80–99)
MCV RBC AUTO: 77 FL (ref 79–97)
MESOTHL CELL NFR FLD: 2 %
MONOCYTES # BLD AUTO: 0.4 X10E3/UL (ref 0.1–0.9)
MONOCYTES # BLD: 0.4 K/UL (ref 0–1)
MONOCYTES NFR BLD AUTO: 10 %
MONOCYTES NFR BLD AUTO: 9 % (ref 5–13)
MONOS+MACROS NFR FLD: 27 %
NEUTROPHILS # BLD AUTO: 2.6 X10E3/UL (ref 1.4–7)
NEUTROPHILS NFR BLD AUTO: 62 %
NEUTS SEG # BLD: 2.3 K/UL (ref 1.8–8)
NEUTS SEG NFR BLD AUTO: 58 % (ref 32–75)
NEUTS SEG NFR FLD: 1 %
NUC CELL # FLD: 274 /CU MM (ref 0–5)
PLATELET # BLD AUTO: 134 K/UL (ref 150–400)
PLATELET # BLD AUTO: 169 X10E3/UL (ref 150–379)
POTASSIUM SERPL-SCNC: 3.3 MMOL/L (ref 3.5–5.1)
PROT FLD-MCNC: 0.9 G/DL
RBC # BLD AUTO: 3.37 X10E6/UL (ref 3.77–5.28)
RBC # BLD AUTO: 3.42 M/UL (ref 3.8–5.2)
RBC # FLD: >100 /CU MM
RBC MORPH BLD: ABNORMAL
RPR SER QL: NONREACTIVE
SODIUM SERPL-SCNC: 141 MMOL/L (ref 136–145)
SPECIMEN SOURCE FLD: ABNORMAL
SPECIMEN SOURCE FLD: NORMAL
WBC # BLD AUTO: 3.9 K/UL (ref 3.6–11)
WBC # BLD AUTO: 4.1 X10E3/UL (ref 3.4–10.8)

## 2017-03-27 PROCEDURE — 87075 CULTR BACTERIA EXCEPT BLOOD: CPT | Performed by: RADIOLOGY

## 2017-03-27 PROCEDURE — 86160 COMPLEMENT ANTIGEN: CPT | Performed by: INTERNAL MEDICINE

## 2017-03-27 PROCEDURE — 82945 GLUCOSE OTHER FLUID: CPT | Performed by: RADIOLOGY

## 2017-03-27 PROCEDURE — 74011250636 HC RX REV CODE- 250/636: Performed by: INTERNAL MEDICINE

## 2017-03-27 PROCEDURE — 86644 CMV ANTIBODY: CPT | Performed by: INTERNAL MEDICINE

## 2017-03-27 PROCEDURE — C9113 INJ PANTOPRAZOLE SODIUM, VIA: HCPCS | Performed by: HOSPITALIST

## 2017-03-27 PROCEDURE — 82150 ASSAY OF AMYLASE: CPT | Performed by: RADIOLOGY

## 2017-03-27 PROCEDURE — 86708 HEPATITIS A ANTIBODY: CPT | Performed by: INTERNAL MEDICINE

## 2017-03-27 PROCEDURE — 87205 SMEAR GRAM STAIN: CPT | Performed by: RADIOLOGY

## 2017-03-27 PROCEDURE — 74011250636 HC RX REV CODE- 250/636: Performed by: HOSPITALIST

## 2017-03-27 PROCEDURE — 85018 HEMOGLOBIN: CPT | Performed by: INTERNAL MEDICINE

## 2017-03-27 PROCEDURE — 85025 COMPLETE CBC W/AUTO DIFF WBC: CPT | Performed by: INTERNAL MEDICINE

## 2017-03-27 PROCEDURE — 84155 ASSAY OF PROTEIN SERUM: CPT | Performed by: INTERNAL MEDICINE

## 2017-03-27 PROCEDURE — 65270000029 HC RM PRIVATE

## 2017-03-27 PROCEDURE — 89050 BODY FLUID CELL COUNT: CPT | Performed by: RADIOLOGY

## 2017-03-27 PROCEDURE — 82042 OTHER SOURCE ALBUMIN QUAN EA: CPT | Performed by: RADIOLOGY

## 2017-03-27 PROCEDURE — 0W9G3ZZ DRAINAGE OF PERITONEAL CAVITY, PERCUTANEOUS APPROACH: ICD-10-PCS | Performed by: RADIOLOGY

## 2017-03-27 PROCEDURE — 82784 ASSAY IGA/IGD/IGG/IGM EACH: CPT | Performed by: INTERNAL MEDICINE

## 2017-03-27 PROCEDURE — 80048 BASIC METABOLIC PNL TOTAL CA: CPT | Performed by: INTERNAL MEDICINE

## 2017-03-27 PROCEDURE — 74011250637 HC RX REV CODE- 250/637: Performed by: HOSPITALIST

## 2017-03-27 PROCEDURE — 74011000258 HC RX REV CODE- 258: Performed by: INTERNAL MEDICINE

## 2017-03-27 PROCEDURE — 83615 LACTATE (LD) (LDH) ENZYME: CPT | Performed by: RADIOLOGY

## 2017-03-27 PROCEDURE — 87902 NFCT AGT GNTYP ALYS HEP C: CPT | Performed by: INTERNAL MEDICINE

## 2017-03-27 PROCEDURE — 36415 COLL VENOUS BLD VENIPUNCTURE: CPT | Performed by: INTERNAL MEDICINE

## 2017-03-27 PROCEDURE — 87522 HEPATITIS C REVRS TRNSCRPJ: CPT | Performed by: INTERNAL MEDICINE

## 2017-03-27 PROCEDURE — 77030028236 US PARACENTESIS ABD W IMAGING

## 2017-03-27 PROCEDURE — 74011250637 HC RX REV CODE- 250/637: Performed by: INTERNAL MEDICINE

## 2017-03-27 PROCEDURE — 87116 MYCOBACTERIA CULTURE: CPT | Performed by: INTERNAL MEDICINE

## 2017-03-27 PROCEDURE — 82595 ASSAY OF CRYOGLOBULIN: CPT | Performed by: INTERNAL MEDICINE

## 2017-03-27 PROCEDURE — 84157 ASSAY OF PROTEIN OTHER: CPT | Performed by: RADIOLOGY

## 2017-03-27 PROCEDURE — 74011000250 HC RX REV CODE- 250: Performed by: HOSPITALIST

## 2017-03-27 RX ORDER — POTASSIUM CHLORIDE 750 MG/1
40 TABLET, FILM COATED, EXTENDED RELEASE ORAL
Status: COMPLETED | OUTPATIENT
Start: 2017-03-27 | End: 2017-03-27

## 2017-03-27 RX ADMIN — TRIAMCINOLONE ACETONIDE: 1 CREAM TOPICAL at 17:26

## 2017-03-27 RX ADMIN — HYDROCODONE BITARTRATE AND ACETAMINOPHEN 1 TABLET: 10; 325 TABLET ORAL at 20:31

## 2017-03-27 RX ADMIN — CEFTRIAXONE 1 G: 1 INJECTION, POWDER, FOR SOLUTION INTRAMUSCULAR; INTRAVENOUS at 09:16

## 2017-03-27 RX ADMIN — UMECLIDINIUM 1 PUFF: 62.5 AEROSOL, POWDER ORAL at 09:17

## 2017-03-27 RX ADMIN — SODIUM CHLORIDE 40 MG: 9 INJECTION INTRAMUSCULAR; INTRAVENOUS; SUBCUTANEOUS at 09:16

## 2017-03-27 RX ADMIN — Medication 325 MG: at 09:16

## 2017-03-27 RX ADMIN — LACTULOSE 10 G: 10 SOLUTION ORAL at 09:16

## 2017-03-27 RX ADMIN — TRIAMCINOLONE ACETONIDE: 1 CREAM TOPICAL at 09:00

## 2017-03-27 RX ADMIN — Medication 10 ML: at 15:18

## 2017-03-27 RX ADMIN — HYDROCODONE BITARTRATE AND ACETAMINOPHEN 1 TABLET: 10; 325 TABLET ORAL at 09:30

## 2017-03-27 RX ADMIN — HYDROCODONE BITARTRATE AND ACETAMINOPHEN 1 TABLET: 10; 325 TABLET ORAL at 01:58

## 2017-03-27 RX ADMIN — LACTULOSE 10 G: 10 SOLUTION ORAL at 17:26

## 2017-03-27 RX ADMIN — Medication 10 ML: at 21:37

## 2017-03-27 RX ADMIN — SODIUM CHLORIDE 40 MG: 9 INJECTION INTRAMUSCULAR; INTRAVENOUS; SUBCUTANEOUS at 21:37

## 2017-03-27 RX ADMIN — Medication 10 ML: at 05:00

## 2017-03-27 RX ADMIN — POTASSIUM CHLORIDE 40 MEQ: 750 TABLET, FILM COATED, EXTENDED RELEASE ORAL at 09:16

## 2017-03-27 RX ADMIN — SENNOSIDES 8.6 MG: 8.6 TABLET, FILM COATED ORAL at 09:16

## 2017-03-27 NOTE — CONSULTS
Surgery      Called by nursing staff around 6 pm regarding consult to Dr Eileen Queen regarding possible biopsy of leg lesion to differentiate possible diagnosis of vasculitis vs Erythema nodosum. Rheumatology Consult is still pending. Non emergent consult can be called to Dr Eileen Queen in the AM.    Biopsy may actually be done as outpatient if needed. Paddy Garcia.  Julianne Milan MD, [facility]

## 2017-03-27 NOTE — PROGRESS NOTES
Pt is a 57 y.o  [place] female admitted with Severe Anemia. Pt was alert, oriented and in no distress resting in bed. Demographic information verified and all is correct. Pt lives with her daughter in a 2 story home with steps. Pt has a walker and 2 canes. Pt needs assistance at times with her ADL's and IADL's. Pt states her daughter and grandson help her. Pt doesn't drive and her family helps with transporting her. Preferred pharmacy is [address] [place] [address]. Denies having home health or SNF in the past. CM will continue to follow for discharge planning needs. Care Management Interventions  PCP Verified by CM: Yes (PCP - Dr. Michael Ardon)  Mode of Transport at Discharge: Other (see comment) ([facility] can transport)  Transition of Care Consult (CM Consult): Discharge Planning  Discharge Durable Medical Equipment: No (2 canes and a walker)  Current Support Network:  Other, Own Home (lives with her daughter in a 2 story home with alot of steps per pt )  Confirm Follow Up Transport: Family  Discharge Location  Discharge Placement: [address], [address]

## 2017-03-27 NOTE — WOUND CARE
WOUND CARE CONSULT:  Consult by physician request for leg wounds. Chart reviewed, patient assessed. Admitted for abdominal pain with a history of HIV, AIDS, Hep C, COPD, HTN and diabetes. The wounds on her lower leg has been there about 4 days. Two of the wounds are raised nodules; all are covered with eschar, linda-wound is hyperpigmented (black) and are VERY painful and itchy. There are three on the right leg and she states another one is forming on the lateral aspect of her left foot. The right proximal lateral lower leg wounds appeared to have some drainage that may be expressed; when pressed on the linda-wound it was very painful and nothing was expressed from the wound. The patient stated that when she was walking, there was a clear drainage that came from this wound. They are similar in appearance to prurigo nodules. The physician ordered 1% Kenalog which is helping with the itching but she requires pain medication prior to applying the Kenalog cream due to the pain with even the slightest touch. Her right foot has red, non-raised, small spots that is also on her toes. Her left foot is starting to get these spots on her great toe and medial heel. Since the wounds are scabbed over, there is nothing from a wound care position. Would continue with the Kenalog cream since it helps with the itching. Should the nodules open up, please re-consult wound care for treatment.     Anupama Rayo RN, BSN, Wound Care Nurse

## 2017-03-27 NOTE — CONSULTS
Infectious Disease Consult    I am asked to see this patient by Dr. Mikayla Frank for advice/opinion related to evaluating HIV infection. The patient was interviewed and examined. All available records were reviewed in detail. The patient is a 57 y.o. female admitted to [facility] on 3/24/2017 with abdominal pain, leg pain and sores, severe anemia. She is a 58 yo SBF from [facility], [facility]. According to the patient, she was diagnosed with HIV infection over 20 yrs ago and evaluated at [facility]. She is unsure if she was initially treated, but it sounds as if HAART was held during that time secondary to old recommendations about not treating for CD4 cts > 300. She was lost to follow up until 2015, and was off HAART, when she was re-evaluated by Dr. Kandy Dickson (ID) and started on Atripla. However, she stopped this when the Rx ran out, and has taken no ART since. She has never had an OI. Past CD4 cts have been stable at over 200 with the most recent one on 3/25/17 at 363. HIV RNA is pending, the last recorded one was in 2013 and was 244,000. It appears she is a discordant patient with high viral loads, but stable CD4 cts off HAART. She also has chronic HCV infection with evidence for portal hypertension, splenomegaly, and ascites. Diagnostic paracentesis done earlier today. She recalls what sounds like interferon Tx in the past, but is not sure. Has not been treated with newer, more effective oral agents. Lastly, she has had recurrent bilateral very tender nodular leg wounds for at least 3 yrs. They arise as nodules, then ulcerate, then scar, and are quite painful. These have been associated with recent leg swelling also. No specific infectious etiology has been found for these. Clinically they sound like E. Nodosum, or vasculitis lesions. Other comorbid conditions include: DM, COPD, DJD, HTN, and a history of pulmonary nodules on past CT imaging. Allergic to PCN with itching only.      Medication list, past medical history, and social history were all reviewed. ASSESSMENT  1. HIV infection. Longstanding with no prior OIs. Elevated Viral Load discordant with stable CD4 cts of over 300. No HAART recently or for any length of time in the past.     Doubt related to leg lesions, but will check for OI. Would not begin HAART until genotype is back. Can afford to wait and see her as an outpatient to do this, which is also a test of her compliance. Additional serologies will be ordered. 2. HCV infection. May well be past the point of treatment with newer agents. Already cirrhotic with ascites. SBP being ruled out. 3. Leg lesions. Have some clinical characteristics of E. Nodosum or other vasculitis related disease. May need biopsy to zero in on diagnosis. Rheumatology is evaluating. Agree with stopping antibiotics if paracentesis fluid is negative. Plan: Will complete work up for possibility of constructing a HAART regimen. Will do this as an outpatient. Will need appt to see me 3 wks after disharge. Stop antibiotics if paracentesis fluid negative. May need biopsy to delineate cause of leg lesions. Subjective:   Date of Consultation:  March 27, 2017  Referring Physician:  Navi     Patient Active Problem List   Diagnosis Code    Degenerative Joint Disease M19.90    Degenerative Disc Disease [id number]    Chronic hepatitis C without mention of hepatic coma ([facility] B18.2    Insomnia G47.00    Allergic rhinitis, cause unspecified J30.9    HIV (human immunodeficiency virus infection) ([facility] Z21    Pulmonary nodules R91.8    Diabetes mellitus type 2, controlled ([address] E11.9    Bruised ribs S18.12A    Sprain and strain of unspecified site of shoulder and upper arm S43.409A, S46.919A    Arthritis, Degenerative, Knee M17.9    HTN (hypertension) I10    Chronic airway obstruction, not elsewhere classified ([address] J44.9    COPD exacerbation (HCC) J44.1    Boils L02.92    Multiple open wounds of lower leg O40.665W    Generalized abdominal pain R10.84    Severe anemia D64.9     Past Medical History:   Diagnosis Date    Allergic rhinitis, cause unspecified 7/21/2011    Arthritis 7/21/2011    Asthma     Burning with urination     Chronic hepatitis C without mention of hepatic coma ([facility] 7/21/2011    Degenerative Disc Disease 7/21/2011    Degenerative Joint Disease 7/21/2011    Diabetes ([facility]     Type II    HIV (human immunodeficiency virus infection) ([facility] 7/21/2011    Hypertension     Insomnia 7/21/2011    Multiple open wounds of lower leg 12/3/2015    Pulmonary nodules 7/21/2011      Family History   Problem Relation Age of Onset    Diabetes Mother     Hypertension Mother     Diabetes Father       Social History   Substance Use Topics    Smoking status: Current Every Day Smoker     Packs/day: 0.50     Years: 15.00     Types: Cigarettes    Smokeless tobacco: Never Used    Alcohol use No     Past Surgical History:   Procedure Laterality Date    HX DILATION AND CURETTAGE  1970    HX GYN  1998    partial hystecomy    HX ORTHOPAEDIC      pins/screws in left leg    HX TUBAL LIGATION        Prior to Admission medications    Medication Sig Start Date End Date Taking? Authorizing Provider   naproxen (NAPROSYN) 500 mg tablet Take 1 Tab by mouth every twelve (12) hours as needed for Pain. 10/26/16   Amina Benitez MD   amLODIPine (NORVASC) 10 mg tablet take 1 tablet by mouth once daily 10/26/16   Amina Benitez MD   insulin glargine (LANTUS) 100 unit/mL injection 10 units subcutaneous bid 10/26/16   Amina Benitez MD   HYDROcodone-acetaminophen [facility]) 7.5-325 mg per tablet Take 1 Tab by mouth every eight (8) hours as needed for Pain. Max Daily Amount: 3 Tabs.  10/26/16   Amina Benitez MD   ibuprofen (MOTRIN) 800 mg tablet take 1 tablet by mouth every 8 hours if needed for pain 9/5/16   Amina Benitez MD   oxyCODONE IR (ROXICODONE) 5 mg immediate release tablet Take 1 Tab by mouth every six (6) hours as needed for Pain (Patient must fill antibiotic in order to fill this.). Max Daily Amount: 20 mg. 8/11/16   NAZ Foote   Blood-Glucose Meter monitoring kit Monitor your blood sugar 3 times daily. 8/11/16   NAZ Foote   TRUEPLUS LANCETS 33 gauge misc  10/14/15   Historical Provider   triamcinolone (ARISTOCORT) 0.5 % topical cream Apply  to affected area two (2) times a day. use thin layer 11/23/15   Treasure Meyer MD   glucose blood VI test strips (TRUETEST TEST STRIPS) strip Patient test once daily 11/6/15   Phylliss Hopkins., MD Cleone Gottron WITH HANDIHALER 18 mcg inhalation capsule inhale the contents of one capsule in the handihaler once daily 10/25/15   Henrietta Arango MD   Lancets misc As directed 10/13/15   Jayna Davis PA-C   glucose blood VI test strips (BLOOD GLUCOSE TEST) strip As directed 10/13/15   Raven Davis PA-C   HUMALOG KWIKPEN 100 unit/mL kwikpen inject 10 units subcutaneously before [place]els, LUNCH, AND DINNER 4/6/15   Henrietta Arango MD   Insulin Needles, Disposable, (BD INSULIN PEN NEEDLE UF MINI) 31 x 3/16 \" ndle Use as directed with insulin pen 2/9/15   Henrietta Arango MD   albuterol (PROVENTIL HFA, VENTOLIN HFA, PROAIR HFA) 90 mcg/actuation inhaler Take 2 puffs by inhalation every four (4) hours as needed for Wheezing. 1/15/15   Henrietta Arango MD   Nebulizer & Compressor machine 1 each by Does Not Apply route four (4) times daily as needed. 1/13/15   Henrietta Arango MD   fluticasone-salmeterol (ADVAIR) 250-50 mcg/dose diskus inhaler Take 1 Puff by inhalation two (2) times a day. 8/1/14   Henrietta Arango MD   omega-3 fatty acids-vitamin e (FISH OIL) 1,000 mg Cap Take 1 Cap by mouth every other day. Historical Provider   ascorbic acid (VITAMIN C) 1,000 mg tablet Take  by mouth every other day.     Historical Provider     Allergies   Allergen Reactions    Codeine Itching     Patient denies Codeine allergy    Penicillin V Hives     Patient denies penicillin allergy.  Pollen Extracts Runny Nose     Itchy, headaches, sneezing        Review of Systems:    - fever, sweats, or chills. -weight loss. +fatigue/malasie. +Rash.  -Swollen glands.  -Oral lesions.  -Photophobia. -Jaundice.   - SOB. - cough. +abdominal pain or tenderness.  -Nausea or vomiting.  - Diarrhea.  -No joint inflammation or swelling. No headache or AMS. + bilateral leg pain and lesions. ROS otherwise negative. The patient lives in [place] with his/her daughter and 3 teenage granchildren. Pet and animal exposure includes none recently, had cats. No recent travel. The following are negative except as noted. [] Raw food injestion   [] Infectious contacts   [] Hot tub exposure   [] Brackish or stagnant water exposure   [] Construction exposure   [x] Insect exposure -mosquitoes   [] MRSA exposure   [] Occupational exposure   [] Recent dental work or invasive procedures          Objective:   Blood pressure 156/77, pulse 97, temperature 98.5 °F (36.9 °C), resp. rate 16, height 5' 7\" (1.702 m), weight 160 lb (72.6 kg), SpO2 99 %, not currently breastfeeding. Temp (24hrs), Av.5 °F (36.9 °C), Min:98.4 °F (36.9 °C), Max:98.7 °F (37.1 °C)  General: Afebrile and not toxic. No exanthem or enanthem. Diffuse shotty peripheral adenopathy. Alert and oriented x3. All IV sites are clean and dry. HEENT: EOMI. Anicteric. Oral mucosa normal. Conjunctivae normal. Neck supple. No thrush. Edentulous. Chest: Lungs clear to A&P. Regular rhythm without murmurs. No peripheral embolic phenomena. Abdomen: Soft without organomegaly, tenderness, or masses. + distended and taut with ascites. Musculoskeletal: No joint inflammation or effusions. Trace edema. Nail beds normal.  Neurologic: Nonfocal exam.  Wounds: multiple wounds on both lower legs, but not soles or dorsum of feet. Some significantly scarred. Others nodular and ulcerated.  All are quite tender. No pus at sites. No cellulitis or lymphangitis. Data Review:  WBC = 3900. H/H = 7.6/25. 1. Plts 134,000. Creat = 0.67. ESR = 85. [facility] CRP <0.29. CT with cirrhotic changes, ascites, and splenomegaly. No + cultures to date. CD4 = 363.           Current Facility-Administered Medications   Medication Dose Route Frequency    cefTRIAXone (ROCEPHIN) 1 g in 0.9% sodium chloride (MBP/ADV) 50 mL  1 g IntraVENous Q24H    ferrous sulfate tablet 325 mg  1 Tab Oral DAILY WITH BREAKFAST    umeclidinium (INCRUSE ELLIPTA) 62.5 mcg/actuation  1 Puff Inhalation DAILY    triamcinolone acetonide (KENALOG) 0.1 % cream   Topical BID    sodium chloride (NS) flush 5-10 mL  5-10 mL IntraVENous Q8H    sodium chloride (NS) flush 5-10 mL  5-10 mL IntraVENous PRN    glucose chewable tablet 16 g  4 Tab Oral PRN    dextrose (D50W) injection syrg 12.5-25 g  12.5-25 g IntraVENous PRN    glucagon (GLUCAGEN) injection 1 mg  1 mg IntraMUSCular PRN    pantoprazole (PROTONIX) 40 mg in sodium chloride 0.9 % 10 mL injection  40 mg IntraVENous Q12H    senna (SENOKOT) tablet 8.6 mg  1 Tab Oral DAILY    polyethylene glycol (MIRALAX) packet 17 g  17 g Oral DAILY PRN    lactulose (CHRONULAC) solution 10 g  10 g Oral BID    acetaminophen (TYLENOL) tablet 650 mg  650 mg Oral Q6H PRN    HYDROcodone-acetaminophen (NORCO)  mg tablet 1 Tab  1 Tab Oral Q6H PRN    hydrALAZINE (APRESOLINE) 20 mg/mL injection 10 mg  10 mg IntraVENous Q6H PRN                Signed By: Margaret Joshi MD     March 27, 2017

## 2017-03-27 NOTE — PROGRESS NOTES
Spiritual Care Partner Volunteer visited patient in Gen Surg on 3/27/17. Documented by:    Mone Miller M.S.   Spiritual Care Department  If need arise please call Asif-RENETTA ([phone])

## 2017-03-27 NOTE — PROGRESS NOTES
Hospitalist Progress Note    NAME: Sue Bridges   :  1955   MRN:  [id number]       Interim Hospital Summary: 57 y.o. female whom presented on 3/24/2017 with      Assessment / Plan:  Liver Cirrhosis  Chronic Hep C  Acute on chronic blood loss anemia  ? SBP  -s/p 3 units prbc  -trend H/H, transfuse for hgb<7   -hold antihypertensive meds due to risk of hemodynamic instability  -iron suppl  -s/p paracentesis with labs sent, ID rec stop abx if paracentesis is neg    Hypokalemia  -K 3.3, will replete  -monitor     Hx of HIV and AIDs  -not on meds, diagnosed 20 yrs ago at [facility]. Had treatment for sometime at [facility], however lost to follow up and had not been on antiretroviral meds for years.  -she is at risk for opportunistic infections  CD4 counts 363 with high viral load. Hepatitis/C panel, RPR ordered  -cxr neg    -ID consulted, needs to f/u outpt in 3 weeks to start meds outpt    UTI  -UA consistent with UTI with c/o urinary symptoms. Pt was already on broad spectrum abx: vancomycin/rocephin/levaquin on admission for UTI and ?cellulitis rx. She completed 3 days of rocephin. B/l LE rash with painful ulcer likely related to erythema nodosum vs cryoglobulinemia related to chronic Hep C  -does not appear to be cellulitis, so will discontinue abx  -RF/CRP neg, SED 85  -f/u with [facility], compliments  - consult rheum for further assistant    T2DM  -A1C 5.4  -SSI    COPD   -stable  -cont' home inhalers      Code status: Full  Prophylaxis: scds  Recommended Disposition: Home w/Family     Subjective:     Chief Complaint / Reason for Physician Visit  Pt seen and examined at bedside. Sleeping in bed, but arousable. Has generalized fatigue/pain in LE. Discussed with RN events overnight.      Review of Systems:  Symptom Y/N Comments  Symptom Y/N Comments   Fever/Chills n   Chest Pain n    Poor Appetite    Edema y    Cough n   Abdominal Pain n    Sputum n   Joint Pain y    SOB/HUNTLEY n   Pruritis/Rash     Nausea/vomit Tolerating PT/OT     Diarrhea n   Tolerating Diet     Constipation    Other       Could NOT obtain due to:      Objective:     VITALS:   Last 24hrs VS reviewed since prior progress note. Most recent are:  Patient Vitals for the past 24 hrs:   Temp Pulse Resp BP SpO2   03/27/17 1300 - 97 16 156/77 99 %   03/27/17 1255 - (!) 101 16 152/78 99 %   03/27/17 1250 - 93 16 156/77 100 %   03/27/17 1226 98.5 °F (36.9 °C) 91 16 148/52 99 %   03/27/17 1131 98.4 °F (36.9 °C) 93 16 151/74 100 %   03/27/17 0839 98.5 °F (36.9 °C) 97 16 136/62 100 %   03/27/17 0457 98.4 °F (36.9 °C) 96 16 148/60 100 %   03/26/17 1918 98.7 °F (37.1 °C) 98 18 151/64 100 %   03/26/17 1529 98.7 °F (37.1 °C) 99 16 152/70 100 %       Intake/Output Summary (Last 24 hours) at 03/27/17 1443  Last data filed at 03/27/17 0457   Gross per 24 hour   Intake              290 ml   Output              650 ml   Net             -360 ml        PHYSICAL EXAM:  General: Sleeping in bed, NAD    EENT:  EOMI. Anicteric sclerae. MMM  Resp:  CTA bilaterally, no wheezing or rales. No accessory muscle use  CV:  Regular  rhythm,  No edema  GI:  Soft, Non distended, Non tender.  +Bowel sounds  Neurologic:  Alert and oriented X 3, normal speech,   Psych:   Good insight. Not anxious nor agitated  Skin:  Nonblanchable rash on b/l LE R>L, multiple ulcers on LE in different stage of healing. Scarring on L leg from large previous ulcer. RLE ulcer painful on palpation. No drainage, erythema seen on legs other than rash.     Reviewed most current lab test results and cultures  YES  Reviewed most current radiology test results   YES  Review and summation of old records today    NO  Reviewed patient's current orders and MAR    YES  PMH/SH reviewed - no change compared to H&P  ________________________________________________________________________  Care Plan discussed with:    Comments   Patient x    Family      RN x    Care Manager     Consultant Multidiciplinary team rounds were held today with , nursing, pharmacist and clinical coordinator. Patient's plan of care was discussed; medications were reviewed and discharge planning was addressed. ________________________________________________________________________  Total NON critical care TIME:  35   Minutes    Total CRITICAL CARE TIME Spent:   Minutes non procedure based      Comments   >50% of visit spent in counseling and coordination of care x    ________________________________________________________________________  Lamonte Ruvalcaba MD     Procedures: see electronic medical records for all procedures/Xrays and details which were not copied into this note but were reviewed prior to creation of Plan. LABS:  I reviewed today's most current labs and imaging studies. Pertinent labs include:  Recent Labs      03/27/17   0500  03/26/17   1620  03/26/17   0300  03/25/17   1859   03/25/17   0608   WBC  3.9   --    --   4.1   --   3.7   HGB  7.6*  8.3*  7.7*  7.7*  7.8*   < >  6.1*   HCT  25.1*  27.6*  25.2*  25.8*  25.3*   < >  20.5*   PLT  134*   --    --   169   --   152    < > = values in this interval not displayed.      Recent Labs      03/27/17   0500  03/26/17   1625  03/25/17   1140  03/24/17   2222  03/24/17   2027   NA  141  142  142   --   142   K  3.3*  3.3*  3.3*   --   3.5   CL  113*  113*  114*   --   113*   CO2  18*  19*  20*   --   18*   GLU  108*  166*  163*   --   135*   BUN  8  8  12   --   17   CREA  0.67  0.77  0.78   --   0.97   CA  7.4*  7.2*  7.1*   --   7.5*   MG   --    --    --    --   2.3   ALB   --    --    --    --   2.0*   TBILI   --    --    --    --   0.4   SGOT   --    --    --    --   69*   ALT   --    --    --    --   39   INR   --    --    --   1.4*   --        Signed: Lamonte Ruvalcaba MD

## 2017-03-27 NOTE — PROGRESS NOTES
End of Shift Nursing Note    Bedside shift change report given to *** (oncoming nurse) by Joe Porter RN (offgoing nurse). Report included the following information SBAR, Kardex, MAR and Recent Results. Zone Phone:   [phone]    Significant changes during shift:    none   Non-emergent issues for physician to address:   none     Number times ambulated in hallway past shift: 0      Number of times OOB to chair past shift: 3     Vital Signs:    Temp: 98.7 °F (37.1 °C)     Pulse (Heart Rate): 98     BP: 151/64     Resp Rate: 18     O2 Sat (%): 100 %    Lines & Drains:    NG tube [] in [] removed [] not applicable   Drains [] in [] removed [] not applicable     Skin Integrity:      Wounds: yes   Dressings Present: no    Wound Concerns: no      GI:    Current diet:  DIET CARDIAC Regular    Nausea: NO  Vomiting: NO  Bowel Sounds: YES  Flatus: YES  Last Bowel Movement: today   Appearance: Rodolfo Sycamore, semi-formed    Respiratory:  Supplemental O2: No       Incentive Spirometer: YES    Coughing and Deep Breathing: YES  Oral Care: YES  Understanding (patient/family education): YES   Getting out of bed: NO  Head of bed elevation: YES    Patient Safety:    Falls Score: 1  Mobility Score: 1  Bed Alarm On? No  Sitter? No      Opportunity for questions and clarification was given to oncoming nurse. Patient bed is in low position, side rails are up x 2, door & observation blinds open as needed, call bell within reach and patient not in distress.     Satcey Meade RN

## 2017-03-27 NOTE — CONSULTS
RHEUMATOLOGY CONSULTATION  RE:  asked to see pt about leg lesions. By Dr. Kathryn Olmedo.    Pt in [address] scanner. I will see later this week. I took the liberty of ordering some labs.

## 2017-03-27 NOTE — PROGRESS NOTES
End of Shift Nursing Note    Bedside shift change report given to Juliet PARISI (oncoming nurse) by Yanni Miller RN (offgoing nurse). Report included the following information SBAR and Kardex. Zone Phone:   [phone]    Significant changes during shift:    none   Non-emergent issues for physician to address:   none     Number times ambulated in hallway past shift: 0      Number of times OOB to chair past shift: 2    POD #:      Vital Signs:    Temp: 98 °F (36.7 °C)     Pulse (Heart Rate): 99     BP: 151/80     Resp Rate: 16     O2 Sat (%): 97 %    Lines & Drains:     Urinary Catheter? No       NG tube [] in [] removed [] not applicable   Drains [] in [] removed [] not applicable     Skin Integrity:      Wounds: yes   Dressings Present: no    Wound Concerns: yes      GI:    Current diet:  DIET CARDIAC Regular    Nausea: NO  Vomiting: NO  Bowel Sounds: YES  Flatus: YES  Last Bowel Movement: yesterday   Appearance:     Respiratory:  Supplemental O2: No      Device:    via  Liters/min     Incentive Spirometer: YES  Volume:   Coughing and Deep Breathing: YES  Oral Care: YES  Understanding (patient/family education): YES   Getting out of bed: YES  Head of bed elevation: YES    Patient Safety:    Falls Score: 3  Mobility Score: 1  Bed Alarm On? No  Sitter? No      Opportunity for questions and clarification was given to oncoming nurse. Patient bed is in low position, side rails are up x 2, door & observation blinds open as needed, call bell within reach and patient not in distress.     Sha Israel RN

## 2017-03-28 LAB
ALBUMIN SERPL BCP-MCNC: 1.8 G/DL (ref 3.5–5)
ANION GAP BLD CALC-SCNC: 10 MMOL/L (ref 5–15)
BASOPHILS # BLD AUTO: 0 K/UL (ref 0–0.1)
BASOPHILS # BLD: 1 % (ref 0–1)
BUN SERPL-MCNC: 7 MG/DL (ref 6–20)
BUN/CREAT SERPL: 12 (ref 12–20)
C1Q AG SERPL-SCNC: 5.3 UG EQ/ML
CALCIUM SERPL-MCNC: 7.6 MG/DL (ref 8.5–10.1)
CHLORIDE SERPL-SCNC: 112 MMOL/L (ref 97–108)
CO2 SERPL-SCNC: 17 MMOL/L (ref 21–32)
CREAT SERPL-MCNC: 0.58 MG/DL (ref 0.55–1.02)
CRYPTOC AG SER QL IA: NEGATIVE
EOSINOPHIL # BLD: 0.1 K/UL (ref 0–0.4)
EOSINOPHIL NFR BLD: 2 % (ref 0–7)
ERYTHROCYTE [DISTWIDTH] IN BLOOD BY AUTOMATED COUNT: 20.4 % (ref 11.5–14.5)
G6PD BLD QN: 203 U/10E12 RBC (ref 146–376)
GLUCOSE BLD STRIP.AUTO-MCNC: 148 MG/DL (ref 65–100)
GLUCOSE BLD STRIP.AUTO-MCNC: 175 MG/DL (ref 65–100)
GLUCOSE BLD STRIP.AUTO-MCNC: 185 MG/DL (ref 65–100)
GLUCOSE BLD STRIP.AUTO-MCNC: 96 MG/DL (ref 65–100)
GLUCOSE SERPL-MCNC: 94 MG/DL (ref 65–100)
HCT VFR BLD AUTO: 25.9 % (ref 35–47)
HCT VFR BLD AUTO: 26.4 % (ref 35–47)
HCV RNA SERPL QL NAA+PROBE: POSITIVE
HGB BLD-MCNC: 7.8 G/DL (ref 11.5–16)
HGB BLD-MCNC: 8.1 G/DL (ref 11.5–16)
LYMPHOCYTES # BLD AUTO: 35 % (ref 12–49)
LYMPHOCYTES # BLD: 1.2 K/UL (ref 0.8–3.5)
MCH RBC QN AUTO: 22.2 PG (ref 26–34)
MCHC RBC AUTO-ENTMCNC: 30.1 G/DL (ref 30–36.5)
MCV RBC AUTO: 73.8 FL (ref 80–99)
MONOCYTES # BLD: 0.3 K/UL (ref 0–1)
MONOCYTES NFR BLD AUTO: 9 % (ref 5–13)
NEUTS SEG # BLD: 1.9 K/UL (ref 1.8–8)
NEUTS SEG NFR BLD AUTO: 53 % (ref 32–75)
PLATELET # BLD AUTO: 124 K/UL (ref 150–400)
POTASSIUM SERPL-SCNC: 3.6 MMOL/L (ref 3.5–5.1)
RBC # BLD AUTO: 3.42 X10E6/UL (ref 3.77–5.28)
RBC # BLD AUTO: 3.51 M/UL (ref 3.8–5.2)
RBC MORPH BLD: ABNORMAL
SERVICE CMNT-IMP: ABNORMAL
SERVICE CMNT-IMP: NORMAL
SODIUM SERPL-SCNC: 139 MMOL/L (ref 136–145)
WBC # BLD AUTO: 3.5 K/UL (ref 3.6–11)

## 2017-03-28 PROCEDURE — 74011000250 HC RX REV CODE- 250: Performed by: HOSPITALIST

## 2017-03-28 PROCEDURE — 82962 GLUCOSE BLOOD TEST: CPT

## 2017-03-28 PROCEDURE — 74011250637 HC RX REV CODE- 250/637: Performed by: HOSPITALIST

## 2017-03-28 PROCEDURE — 87077 CULTURE AEROBIC IDENTIFY: CPT | Performed by: SURGERY

## 2017-03-28 PROCEDURE — 74011250637 HC RX REV CODE- 250/637: Performed by: INTERNAL MEDICINE

## 2017-03-28 PROCEDURE — 87205 SMEAR GRAM STAIN: CPT | Performed by: SURGERY

## 2017-03-28 PROCEDURE — 74011250636 HC RX REV CODE- 250/636: Performed by: INTERNAL MEDICINE

## 2017-03-28 PROCEDURE — 86777 TOXOPLASMA ANTIBODY: CPT | Performed by: INTERNAL MEDICINE

## 2017-03-28 PROCEDURE — 87147 CULTURE TYPE IMMUNOLOGIC: CPT | Performed by: SURGERY

## 2017-03-28 PROCEDURE — 87327 CRYPTOCOCCUS NEOFORM AG IA: CPT | Performed by: INTERNAL MEDICINE

## 2017-03-28 PROCEDURE — 82105 ALPHA-FETOPROTEIN SERUM: CPT | Performed by: INTERNAL MEDICINE

## 2017-03-28 PROCEDURE — G8979 MOBILITY GOAL STATUS: HCPCS | Performed by: PHYSICAL THERAPIST

## 2017-03-28 PROCEDURE — 74011250636 HC RX REV CODE- 250/636

## 2017-03-28 PROCEDURE — 97116 GAIT TRAINING THERAPY: CPT | Performed by: PHYSICAL THERAPIST

## 2017-03-28 PROCEDURE — 87186 SC STD MICRODIL/AGAR DIL: CPT | Performed by: SURGERY

## 2017-03-28 PROCEDURE — 77030018836 HC SOL IRR NACL ICUM -A

## 2017-03-28 PROCEDURE — 86038 ANTINUCLEAR ANTIBODIES: CPT | Performed by: INTERNAL MEDICINE

## 2017-03-28 PROCEDURE — 86235 NUCLEAR ANTIGEN ANTIBODY: CPT | Performed by: INTERNAL MEDICINE

## 2017-03-28 PROCEDURE — 74011000250 HC RX REV CODE- 250: Performed by: SURGERY

## 2017-03-28 PROCEDURE — 97535 SELF CARE MNGMENT TRAINING: CPT

## 2017-03-28 PROCEDURE — 36415 COLL VENOUS BLD VENIPUNCTURE: CPT | Performed by: INTERNAL MEDICINE

## 2017-03-28 PROCEDURE — 82232 ASSAY OF BETA-2 PROTEIN: CPT | Performed by: INTERNAL MEDICINE

## 2017-03-28 PROCEDURE — 87116 MYCOBACTERIA CULTURE: CPT | Performed by: SURGERY

## 2017-03-28 PROCEDURE — 74011000258 HC RX REV CODE- 258: Performed by: INTERNAL MEDICINE

## 2017-03-28 PROCEDURE — G8978 MOBILITY CURRENT STATUS: HCPCS | Performed by: PHYSICAL THERAPIST

## 2017-03-28 PROCEDURE — 85025 COMPLETE CBC W/AUTO DIFF WBC: CPT | Performed by: INTERNAL MEDICINE

## 2017-03-28 PROCEDURE — 97166 OT EVAL MOD COMPLEX 45 MIN: CPT

## 2017-03-28 PROCEDURE — 88305 TISSUE EXAM BY PATHOLOGIST: CPT | Performed by: SURGERY

## 2017-03-28 PROCEDURE — 83520 IMMUNOASSAY QUANT NOS NONAB: CPT | Performed by: INTERNAL MEDICINE

## 2017-03-28 PROCEDURE — 82652 VIT D 1 25-DIHYDROXY: CPT | Performed by: INTERNAL MEDICINE

## 2017-03-28 PROCEDURE — 82172 ASSAY OF APOLIPOPROTEIN: CPT | Performed by: INTERNAL MEDICINE

## 2017-03-28 PROCEDURE — 86147 CARDIOLIPIN ANTIBODY EA IG: CPT | Performed by: INTERNAL MEDICINE

## 2017-03-28 PROCEDURE — 65270000029 HC RM PRIVATE

## 2017-03-28 PROCEDURE — 0HBKXZX EXCISION OF RIGHT LOWER LEG SKIN, EXTERNAL APPROACH, DIAGNOSTIC: ICD-10-PCS | Performed by: SURGERY

## 2017-03-28 PROCEDURE — 85613 RUSSELL VIPER VENOM DILUTED: CPT | Performed by: INTERNAL MEDICINE

## 2017-03-28 PROCEDURE — 86480 TB TEST CELL IMMUN MEASURE: CPT | Performed by: INTERNAL MEDICINE

## 2017-03-28 PROCEDURE — 88312 SPECIAL STAINS GROUP 1: CPT | Performed by: SURGERY

## 2017-03-28 PROCEDURE — 82040 ASSAY OF SERUM ALBUMIN: CPT | Performed by: INTERNAL MEDICINE

## 2017-03-28 PROCEDURE — 88112 CYTOPATH CELL ENHANCE TECH: CPT | Performed by: HOSPITALIST

## 2017-03-28 PROCEDURE — 97162 PT EVAL MOD COMPLEX 30 MIN: CPT | Performed by: PHYSICAL THERAPIST

## 2017-03-28 PROCEDURE — 85018 HEMOGLOBIN: CPT | Performed by: INTERNAL MEDICINE

## 2017-03-28 PROCEDURE — C9113 INJ PANTOPRAZOLE SODIUM, VIA: HCPCS | Performed by: HOSPITALIST

## 2017-03-28 PROCEDURE — 80048 BASIC METABOLIC PNL TOTAL CA: CPT | Performed by: INTERNAL MEDICINE

## 2017-03-28 PROCEDURE — 74011250636 HC RX REV CODE- 250/636: Performed by: HOSPITALIST

## 2017-03-28 PROCEDURE — 87102 FUNGUS ISOLATION CULTURE: CPT | Performed by: SURGERY

## 2017-03-28 RX ORDER — DEXTROSE 50 % IN WATER (D50W) INTRAVENOUS SYRINGE
12.5-25 AS NEEDED
Status: DISCONTINUED | OUTPATIENT
Start: 2017-03-28 | End: 2017-03-30 | Stop reason: HOSPADM

## 2017-03-28 RX ORDER — PROPRANOLOL HYDROCHLORIDE 10 MG/1
10 TABLET ORAL 3 TIMES DAILY
Status: DISCONTINUED | OUTPATIENT
Start: 2017-03-28 | End: 2017-03-28

## 2017-03-28 RX ORDER — HYDROMORPHONE HYDROCHLORIDE 1 MG/ML
1 INJECTION, SOLUTION INTRAMUSCULAR; INTRAVENOUS; SUBCUTANEOUS ONCE
Status: ACTIVE | OUTPATIENT
Start: 2017-03-28 | End: 2017-03-29

## 2017-03-28 RX ORDER — LANOLIN ALCOHOL/MO/W.PET/CERES
1 CREAM (GRAM) TOPICAL 2 TIMES DAILY WITH MEALS
Status: DISCONTINUED | OUTPATIENT
Start: 2017-03-28 | End: 2017-03-30 | Stop reason: HOSPADM

## 2017-03-28 RX ORDER — HYDROMORPHONE HYDROCHLORIDE 2 MG/ML
INJECTION, SOLUTION INTRAMUSCULAR; INTRAVENOUS; SUBCUTANEOUS
Status: COMPLETED
Start: 2017-03-28 | End: 2017-03-28

## 2017-03-28 RX ORDER — MAGNESIUM SULFATE 100 %
4 CRYSTALS MISCELLANEOUS AS NEEDED
Status: DISCONTINUED | OUTPATIENT
Start: 2017-03-28 | End: 2017-03-30 | Stop reason: HOSPADM

## 2017-03-28 RX ORDER — PROPRANOLOL HYDROCHLORIDE 10 MG/1
10 TABLET ORAL 2 TIMES DAILY
Status: DISCONTINUED | OUTPATIENT
Start: 2017-03-28 | End: 2017-03-30 | Stop reason: HOSPADM

## 2017-03-28 RX ORDER — SPIRONOLACTONE 25 MG/1
50 TABLET ORAL DAILY
Status: DISCONTINUED | OUTPATIENT
Start: 2017-03-28 | End: 2017-03-29

## 2017-03-28 RX ORDER — HYDROCODONE BITARTRATE AND ACETAMINOPHEN 10; 325 MG/1; MG/1
1 TABLET ORAL ONCE
Status: COMPLETED | OUTPATIENT
Start: 2017-03-28 | End: 2017-03-28

## 2017-03-28 RX ORDER — INSULIN LISPRO 100 [IU]/ML
INJECTION, SOLUTION INTRAVENOUS; SUBCUTANEOUS
Status: DISCONTINUED | OUTPATIENT
Start: 2017-03-28 | End: 2017-03-30 | Stop reason: HOSPADM

## 2017-03-28 RX ORDER — LIDOCAINE HYDROCHLORIDE 10 MG/ML
10 INJECTION, SOLUTION EPIDURAL; INFILTRATION; INTRACAUDAL; PERINEURAL ONCE
Status: COMPLETED | OUTPATIENT
Start: 2017-03-28 | End: 2017-03-28

## 2017-03-28 RX ADMIN — HYDROCODONE BITARTRATE AND ACETAMINOPHEN 1 TABLET: 10; 325 TABLET ORAL at 12:16

## 2017-03-28 RX ADMIN — PROPRANOLOL HYDROCHLORIDE 10 MG: 10 TABLET ORAL at 09:15

## 2017-03-28 RX ADMIN — LACTULOSE 10 G: 10 SOLUTION ORAL at 18:22

## 2017-03-28 RX ADMIN — HYDROCODONE BITARTRATE AND ACETAMINOPHEN 1 TABLET: 10; 325 TABLET ORAL at 05:42

## 2017-03-28 RX ADMIN — UMECLIDINIUM 1 PUFF: 62.5 AEROSOL, POWDER ORAL at 09:13

## 2017-03-28 RX ADMIN — SENNOSIDES 8.6 MG: 8.6 TABLET, FILM COATED ORAL at 09:12

## 2017-03-28 RX ADMIN — Medication 325 MG: at 09:12

## 2017-03-28 RX ADMIN — CEFTRIAXONE 1 G: 1 INJECTION, POWDER, FOR SOLUTION INTRAMUSCULAR; INTRAVENOUS at 09:15

## 2017-03-28 RX ADMIN — Medication 325 MG: at 18:22

## 2017-03-28 RX ADMIN — PROPRANOLOL HYDROCHLORIDE 10 MG: 10 TABLET ORAL at 21:25

## 2017-03-28 RX ADMIN — HYDROMORPHONE HYDROCHLORIDE 1 MG: 2 INJECTION, SOLUTION INTRAMUSCULAR; INTRAVENOUS; SUBCUTANEOUS at 18:13

## 2017-03-28 RX ADMIN — TRIAMCINOLONE ACETONIDE: 1 CREAM TOPICAL at 09:13

## 2017-03-28 RX ADMIN — Medication 10 ML: at 05:43

## 2017-03-28 RX ADMIN — Medication 10 ML: at 21:27

## 2017-03-28 RX ADMIN — Medication 10 ML: at 13:19

## 2017-03-28 RX ADMIN — LIDOCAINE HYDROCHLORIDE 10 ML: 10 INJECTION, SOLUTION EPIDURAL; INFILTRATION; INTRACAUDAL; PERINEURAL at 11:33

## 2017-03-28 RX ADMIN — LACTULOSE 10 G: 10 SOLUTION ORAL at 09:12

## 2017-03-28 RX ADMIN — SPIRONOLACTONE 50 MG: 25 TABLET, FILM COATED ORAL at 09:15

## 2017-03-28 RX ADMIN — HYDROCODONE BITARTRATE AND ACETAMINOPHEN 1 TABLET: 10; 325 TABLET ORAL at 18:22

## 2017-03-28 RX ADMIN — TRIAMCINOLONE ACETONIDE: 1 CREAM TOPICAL at 18:34

## 2017-03-28 RX ADMIN — SODIUM CHLORIDE 40 MG: 9 INJECTION INTRAMUSCULAR; INTRAVENOUS; SUBCUTANEOUS at 21:26

## 2017-03-28 RX ADMIN — SODIUM CHLORIDE 40 MG: 9 INJECTION INTRAMUSCULAR; INTRAVENOUS; SUBCUTANEOUS at 09:12

## 2017-03-28 NOTE — PROGRESS NOTES
Hospitalist Progress Note    NAME: Herbert Mckeon   :  1955   MRN:  [id number]       Interim Hospital Summary: 57 y.o. female whom presented on 3/24/2017 with      Assessment / Plan:  Liver Cirrhosis/Chronic Hep C: as part of her treatment needs PPI, BB and spironolactone, will start those and monitor. Acute on chronic blood loss anemia: no louis bleeding at this time, c/w iron supplement,. S/p 3 units PRBC. ?SBP: c/w CTX/Vancomycin, Fluid no organism seen but high cellularity. Abnormal U/A: UTI ruled out, culture shows no growth. Hypokalemia monitor. Hx of HIV and AIDs  Dr. Dannielle Enamorado in the case, plan to follow as outpatient.  -not on meds, diagnosed 20 yrs ago at [facility]. Had treatment for sometime at [facility], however lost to follow up and had not been on antiretroviral meds for years.  -she is at risk for opportunistic infections  CD4 counts 363 with high viral load. Hepatitis/C panel, RPR ordered  -cxr neg    B/l LE rash with painful ulcer likely related to erythema nodosum vs cryoglobulinemia related to chronic Hep C  -Erythema had decreased while on ABX, wound still present  -RF/CRP neg, SED 85  -f/u with [facility], compliments  - consult rheum for further assistant  T2DM A1C 5.4  c/w SSI  COPD  Stable, cont' home inhalers  Code status: Full  Prophylaxis: scds  Recommended Disposition: Home w/Family     Subjective:     Chief Complaint / Reason for Physician Visit  Pt seen and examined at bedside. Sleeping in bed, but arousable. Has generalized fatigue/pain in LE. Discussed with RN events overnight.      Review of Systems:  Symptom Y/N Comments  Symptom Y/N Comments   Fever/Chills n   Chest Pain n    Poor Appetite    Edema y    Cough n   Abdominal Pain n    Sputum n   Joint Pain y    SOB/HUNTLEY n   Pruritis/Rash     Nausea/vomit    Tolerating PT/OT     Diarrhea n   Tolerating Diet y    Constipation    Other       Could NOT obtain due to:      Objective:     VITALS:   Last 24hrs VS reviewed since prior progress note. Most recent are:  Patient Vitals for the past 24 hrs:   Temp Pulse Resp BP SpO2   03/28/17 0343 98.7 °F (37.1 °C) 94 16 160/79 100 %   03/27/17 2340 99.6 °F (37.6 °C) 93 20 154/70 100 %   03/27/17 2008 98.8 °F (37.1 °C) 98 20 170/77 100 %   03/27/17 1521 98 °F (36.7 °C) 99 16 151/80 97 %   03/27/17 1300 - 97 16 156/77 99 %   03/27/17 1255 - (!) 101 16 152/78 99 %   03/27/17 1250 - 93 16 156/77 100 %   03/27/17 1226 98.5 °F (36.9 °C) 91 16 148/52 99 %   03/27/17 1131 98.4 °F (36.9 °C) 93 16 151/74 100 %   03/27/17 0839 98.5 °F (36.9 °C) 97 16 136/62 100 %     No intake or output data in the 24 hours ending 03/28/17 0804     PHYSICAL EXAM:  General: Awake and cooperative, NAD    EENT:  EOMI. Anicteric sclerae. MMM  Resp:  CTA bilaterally, no wheezing or rales. No accessory muscle use  CV:  Regular  rhythm,  No edema  GI:  Soft, Non distended, Non tender.  +Bowel sounds  Neurologic:  Alert and oriented X 3, normal speech,   Psych:   Good insight. Not anxious nor agitated  Skin:  Nonblanchable rash on b/l LE R>L, multiple ulcers on LE in different stage of healing. Scarring on L leg from large previous ulcer. RLE ulcer painful on palpation. No drainage, erythema seen on legs other than rash. Reviewed most current lab test results and cultures  YES  Reviewed most current radiology test results   YES  Review and summation of old records today    NO  Reviewed patient's current orders and MAR    YES  PMH/SH reviewed - no change compared to H&P  ________________________________________________________________________  Care Plan discussed with:    Comments   Patient x    Family      RN x    Care Manager     Consultant                        Multidiciplinary team rounds were held today with , nursing, pharmacist and clinical coordinator. Patient's plan of care was discussed; medications were reviewed and discharge planning was addressed. ________________________________________________________________________  Total NON critical care TIME:  35   Minutes    Total CRITICAL CARE TIME Spent:   Minutes non procedure based      Comments   >50% of visit spent in counseling and coordination of care x    ________________________________________________________________________  Katie Delarosa MD     Procedures: see electronic medical records for all procedures/Xrays and details which were not copied into this note but were reviewed prior to creation of Plan. LABS:  I reviewed today's most current labs and imaging studies. Pertinent labs include:  Recent Labs      03/28/17   0517 03/27/17   1747  03/27/17   0500   03/25/17   1859   WBC  3.5*   --   3.9   --   4.1   HGB  7.8*  8.0*  7.6*   < >  7.7*  7.8*   HCT  25.9*  26.5*  25.1*   < >  25.8*  25.3*   PLT  124*   --   134*   --   169    < > = values in this interval not displayed.      Recent Labs      03/28/17   0517  03/27/17   0500  03/26/17   1625   NA  139  141  142   K  3.6  3.3*  3.3*   CL  112*  113*  113*   CO2  17*  18*  19*   GLU  94  108*  166*   BUN  7  8  8   CREA  0.58  0.67  0.77   CA  7.6*  7.4*  7.2*       Signed: Katie Delarosa MD

## 2017-03-28 NOTE — PROGRESS NOTES
Problem: Self Care Deficits Care Plan (Adult)  Goal: *Acute Goals and Plan of Care (Insert Text)  Occupational Therapy Goals  Initiated 3/28/2017  1. Patient will perform grooming ins atdning at the sink with no more than 1 seated rest break within 7 day(s). 2. Patient will perform upper body dressing with supervision/set-up within 7 day(s). 3. Patient will perform lower body dressing with AE with minimal assistance/contact guard assist within 7 day(s). 4. Patient will perform toilet transfers to a raised toilet seat or a BSC with supervision/set-up within 7 day(s). 5. Patient will perform all aspects of toileting with good safety awareness and supervision/set-up within 7 day(s). 6. Patient will participate in upper extremity therapeutic exercise/activities with minimal assistance/contact guard assist for 10 minutes within 7 day(s). OCCUPATIONAL THERAPY EVALUATION  Patient: Nima Gan (57 y.o. female)  Date: 3/28/2017  Primary Diagnosis: Severe anemia        Precautions:   Fall, Contact, Skin      ASSESSMENT :  Based on the objective data described below, the patient presents with poor activity tolerance, weakness, impaired standing endurance and decreased safety with functional transfers. Her self report is questionable as she contradicted herself as she spoke. Currently she is mod to s/u with all self care. She reports having a very hard time moving her left leg over her tub wall and therefore she primarily sponge bathes at home. She has assist from her daughter and grandchild as needed. She will require skilled OT to increase safety and independence in all self care, as well as home health OT to assess home safety and recommend appropriate bathroom DME. Patient will benefit from skilled intervention to address the above impairments.   Patients rehabilitation potential is considered to be Fair  Factors which may influence rehabilitation potential include:   [ ]             None noted  [ ] Mental ability/status  [X]             Medical condition  [ ]             Home/family situation and support systems  [ ]             Safety awareness  [ ]             Pain tolerance/management  [X]             Other: history of non-compliance with medical recommendations       PLAN :  Recommendations and Planned Interventions:  [X]               Self Care Training                  [ ]        Therapeutic Activities  [X]               Functional Mobility Training    [ ]        Cognitive Retraining  [X]               Therapeutic Exercises           [X]        Endurance Activities  [ ]               Balance Training                   [ ]        Neuromuscular Re-Education  [ ]               Visual/Perceptual Training     [X]   Home Safety Training  [X]               Patient Education                 [X]        Family Training/Education  [ ]               Other (comment):     Frequency/Duration: Patient will be followed by occupational therapy 3 times a week to address goals. Discharge Recommendations: Home Health  Further Equipment Recommendations for Discharge: patient is requesting a BSC but recommend waiting for home health assessment to be sure she gets one she will be able to use in her home        SUBJECTIVE:   Patient stated I sometimes cannot get to the bathroom on time so my daughter bought me diapers.       OBJECTIVE DATA SUMMARY:   HISTORY:   Past Medical History:   Diagnosis Date    Allergic rhinitis, cause unspecified 7/21/2011    Arthritis 7/21/2011    Asthma      Burning with urination      Chronic hepatitis C without mention of hepatic coma ([facility] [address] 7/21/2011    Degenerative Disc Disease 7/21/2011    Degenerative Joint Disease 7/21/2011    Diabetes ([facility] [address]       Type II    HIV (human immunodeficiency virus infection) ([facility] [address] 7/21/2011    Hypertension      Insomnia 7/21/2011    Multiple open wounds of lower leg 12/3/2015    Pulmonary nodules 7/21/2011     Past Surgical History:   Procedure Laterality Date    HX DILATION AND CURETTAGE   1970    HX GYN   1998     partial hystecomy    HX ORTHOPAEDIC         pins/screws in left leg    HX TUBAL LIGATION            Prior Level of Function/Home Situation: Lives with her daughter and grandson who assist PRN with self care. She states she can do for herself but they help, but she was unable to specify what they help with  Expanded or extensive additional review of patient history:      Home Situation  Home Environment: Private residence  One/Two Story Residence: Two story  Living Alone: No (daughter and grandson)  Support Systems: Friends \ neighbors, Family member(s)  Patient Expects to be Discharged to[de-identified] Private residence  Current DME Used/Available at Home: Charlanne Bienenstock, straight, Walker, rolling  Tub or Shower Type: Tub/Shower combination  [ ]  Right hand dominant   [ ]  Left hand dominant     EXAMINATION OF PERFORMANCE DEFICITS:  Cognitive/Behavioral Status:  Neurologic State: Alert  Orientation Level: Oriented to place;Oriented to person;Oriented to time;Oriented to situation  Cognition: Appropriate safety awareness  Perception: Appears intact  Perseveration: No perseveration noted  Safety/Judgement: Insight into deficits     Skin: very thin     Edema: none in UE     Hearing: Auditory  Auditory Impairment: Hard of hearing, right side     Vision/Perceptual:       She states her vision is good but then had difficulty making out numbers on her dry erase board. She then states she has some blurry vision and needs new glasses                    Acuity: Able to read employee name badge without difficulty; Able to read clock/calendar on wall without difficulty    Corrective Lenses: Glasses     Range of Motion:  Left shoulder with decreased AROM due to old \"accident\" per patient. She reports she did not have it looked at.   AROM: Generally decreased, functional  PROM: Generally decreased, functional                       Strength:  Fair in UE but poor endurance  Strength: Generally decreased, functional                 Coordination:  Coordination: Generally decreased, functional  Fine Motor Skills-Upper: Left Intact; Right Intact    Gross Motor Skills-Upper: Right Intact; Left Impaired (old shoulder injury)     Tone & Sensation:  Sensation in UE not formally assessed  Tone: Normal  Sensation: Intact                       Balance:  Sitting: Intact  Standing: Impaired  Standing - Static: Fair (Best with walker)  Standing - Dynamic : Fair     Functional Mobility and Transfers for ADLs:  Bed Mobility:  Rolling: Minimum assistance  Supine to Sit: Minimum assistance  Sit to Supine: Minimum assistance  Scooting: Minimum assistance     Transfers:  Sit to Stand: Minimum assistance  Stand to Sit: Contact guard assistance     ADL Assessment:  Feeding: Setup (soft foods only)     Oral Facial Hygiene/Grooming: Setup     Bathing: Moderate assistance     Upper Body Dressing: Moderate assistance     Lower Body Dressing: Maximum assistance     Toileting: Moderate assistance        ADL Intervention and task modifications:     Educated on safe SPT from bed to [facility]. Educated on placing BSC 45* angle to bed and not turning more than a 1/4 once standing EOB           Cognitive Retraining  Safety/Judgement: Insight into deficits     Functional Measure:  Barthel Index:      Bathin  Bladder: 5  Bowels: 5  Groomin  Dressin  Feedin  Mobility: 10  Stairs: 5  Toilet Use: 5  Transfer (Bed to Chair and Back): 10  Total: 55         Barthel and G-code impairment scale:  Percentage of impairment CH  0% CI  1-19% CJ  20-39% CK  40-59% CL  60-79% CM  80-99% CN  100%   Barthel Score 0-100 100 99-80 79-60 59-40 20-39 1-19    0   Barthel Score 0-20 20 17-19 13-16 9-12 5-8 1-4 0      The Barthel ADL Index: Guidelines  1. The index should be used as a record of what a patient does, not as a record of what a patient could do.   2. The main aim is to establish degree of independence from any help, physical or verbal, however minor and for whatever reason. 3. The need for supervision renders the patient not independent. 4. A patient's performance should be established using the best available evidence. Asking the patient, friends/relatives and nurses are the usual sources, but direct observation and common sense are also important. However direct testing is not needed. 5. Usually the patient's performance over the preceding 24-48 hours is important, but occasionally longer periods will be relevant. 6. Middle categories imply that the patient supplies over 50 per cent of the effort. 7. Use of aids to be independent is allowed. Tammy Ozuna., Barthel, D.W. ([id number]). Functional evaluation: the Barthel Index. [facility] (14)2. Misa Brown matilde MIA Gomez, Chioma Gracia., [facility] [place], [address] [place] [address] Ave (1999). Measuring the change indisability after inpatient rehabilitation; comparison of the responsiveness of the Barthel Index and Functional [place] Measure. Journal of Neurology, Neurosurgery, and Psychiatry, 66(4), 117-447. RAVI ArnettA, URIEL Harry, & Daphne Ortega MJV. (2004.) Assessment of post-stroke quality of life in cost-effectiveness studies: The usefulness of the Barthel Index and the EuroQoL-5D. Quality of Life Research, 13, 040-10            G codes: In compliance with CMSs Claims Based Outcome Reporting, the following G-code set was chosen for this patient based on their primary functional limitation being treated: The outcome measure chosen to determine the severity of the functional limitation was the Barthel with a score of 55/100 which was correlated with the impairment scale.       · Self Care:               - CURRENT STATUS:    CK - 40%-59% impaired, limited or restricted               - GOAL STATUS:           CI - 1%-19% impaired, limited or restricted               - D/C STATUS:                       ---------------To be determined---------------      Occupational Therapy Evaluation Charge Determination   History Examination Decision-Making   MEDIUM Complexity : Expanded review of history including physical, cognitive and psychosocial  history  LOW Complexity : 1-3 performance deficits relating to physical, cognitive , or psychosocial skils that result in activity limitations and / or participation restrictions  LOW Complexity : No comorbidities that affect functional and no verbal or physical assistance needed to complete eval tasks       Based on the above components, the patient evaluation is determined to be of the following complexity level: LOW   Pain:  Pain Scale 1: Numeric (0 - 10)  Pain Intensity 1: 0  Pain Location 1: Abdomen;Leg;Foot  Pain Orientation 1: Anterior  Pain Description 1: Aching  Pain Intervention(s) 1: Medication (see MAR)  Activity Tolerance:   Poor- she needs a lot of endurance training to improve her activity tolerance  Please refer to the flowsheet for vital signs taken during this treatment. After treatment:   [ ] Patient left in no apparent distress sitting up in chair  [X] Patient left in no apparent distress in bed  [X] Call bell left within reach  [X] Nursing notified  [ ] Caregiver present  [ ] Bed alarm activated      COMMUNICATION/EDUCATION:   The patients plan of care was discussed with: Physical Therapist and Registered Nurse.  [X] Home safety education was provided and the patient/caregiver indicated understanding. [X] Patient/family have participated as able in goal setting and plan of care. [X] Patient/family agree to work toward stated goals and plan of care. [ ] Patient understands intent and goals of therapy, but is neutral about his/her participation. [ ] Patient is unable to participate in goal setting and plan of care. This patients plan of care is appropriate for delegation to \[facility]\"".      Thank you for this referral.  Erin Armstrong OT  Time Calculation: 15 mins

## 2017-03-28 NOTE — PROGRESS NOTES
Problem: Mobility Impaired (Adult and Pediatric)  Goal: *Acute Goals and Plan of Care (Insert Text)  Physical Therapy Goals  Initiated 3/28/2017  1. Patient will move from supine to sit and sit to supine in bed with modified independence within 7 day(s). 2. Patient will transfer from bed to chair and chair to bed with modified independence using the least restrictive device within 7 day(s). 3. Patient will perform sit to stand with modified independence within 7 day(s). 4. Patient will ambulate with modified independence for 155 feet with the least restrictive device within 7 day(s). 5. Patient will ascend/descend 12 stairs with 2 handrail(s) with minimal assistance/contact guard assist within 7 day(s). PHYSICAL THERAPY EVALUATION  Patient: William Dejesus (57 y.o. female)  Date: 3/28/2017  Primary Diagnosis: Severe anemia        Precautions: Fall, RLE decubitus         ASSESSMENT :  Based on the objective data described below, the patient presents with decreased mobility, antalgic gait from old LLE ORIF and reluctance to perform mobility. She would benefit from PT follow up at [address] [place] [address] with transition to home health PT. She has bilateral single point canes and rollator but would like  commode. We will follow 5 x weekly for PT. Patient will benefit from skilled intervention to address the above impairments.   Patients rehabilitation potential is considered to be Good  Factors which may influence rehabilitation potential include:   [X]         None noted  [ ]         Mental ability/status  [ ]         Medical condition  [ ]         Home/family situation and support systems  [ ]         Safety awareness  [ ]         Pain tolerance/management  [ ]         Other:        PLAN :  Recommendations and Planned Interventions:  [X]           Bed Mobility Training             [ ]    Neuromuscular Re-Education  [X]           Transfer Training                   [ ]    Orthotic/Prosthetic Training  [X]           Gait Training                         [ ]    Modalities  [X]           Therapeutic Exercises           [ ]    Edema Management/Control  [X]           Therapeutic Activities            [X]    Patient and Family Training/Education  [ ]           Other (comment):     Frequency/Duration: Patient will be followed by physical therapy  5 times a week to address goals. Discharge Recommendations: Home Health  Further Equipment Recommendations for Discharge: bedside commode       SUBJECTIVE:   Patient stated I want to go back to bed. Yunier Boyd      OBJECTIVE DATA SUMMARY:   HISTORY:    Past Medical History:   Diagnosis Date    Allergic rhinitis, cause unspecified 7/21/2011    Arthritis 7/21/2011    Asthma      Burning with urination      Chronic hepatitis C without mention of hepatic coma ([facility] [address] 7/21/2011    Degenerative Disc Disease 7/21/2011    Degenerative Joint Disease 7/21/2011    Diabetes ([facility] [address]       Type II    HIV (human immunodeficiency virus infection) ([facility] [address] 7/21/2011    Hypertension      Insomnia 7/21/2011    Multiple open wounds of lower leg 12/3/2015    Pulmonary nodules 7/21/2011     Past Surgical History:   Procedure Laterality Date    HX DILATION AND CURETTAGE   1970    HX GYN   1998     partial hystecomy    HX ORTHOPAEDIC         pins/screws in left leg    HX TUBAL LIGATION         Prior Level of Function/Home Situation: Ambulatory in home and limited community with bilateral SPCs and rollator  Personal factors and/or comorbidities impacting plan of care: Retisence to perform mobility     Home Situation  Home Environment: Private residence  One/Two Story Residence: Two story  Living Alone: No  Support Systems: Family member(s), Friends \ neighbors, [facility] / sera community  Patient Expects to be Discharged to[de-identified] Patient room  Current DME Used/Available at Home: naomie Espinoza, Walker, rolling     EXAMINATION/PRESENTATION/DECISION MAKING:   Critical Behavior:     Orientation Level: Appropriate for age  Hearing: Auditory  Auditory Impairment: None  Skin:  RLE wound bandaged and not inspected  Edema: +1 edema RLE  Range Of Motion:  PROM: Generally decreased, functional     Strength:    Strength: Generally decreased, functional     Tone & Sensation:      Sensation: Intact       Coordination:  Coordination: Generally decreased, functional     Functional Mobility:  Bed Mobility:  Rolling: Minimum assistance  Supine to Sit: Minimum assistance  Sit to Supine: Minimum assistance  Scooting: Minimum assistance  Transfers:  Sit to Stand: Minimum assistance  Stand to Sit: Contact guard assistance  Stand Pivot Transfers: Modified independent (with rolling walker)        Balance:   Sitting: Intact  Standing: Impaired  Standing - Static: Fair (Best with walker)  Standing - Dynamic : Fair  Ambulation/Gait Training:  Distance (ft): 65 Feet (ft)  Assistive Device: Walker, rolling  Ambulation - Level of Assistance: Contact guard assistance     Gait Description (WDL): Exceptions to WDL  Gait Abnormalities: Decreased step clearance;Shuffling gait; Antalgic        Base of Support: Narrowed     Speed/Adrienne: Slow  Step Length: Left shortened;Right shortened                    Functional Measure:  Functional Reach:      Completed:  [ ] standing       [X] seated     Average: 7.33 \"          Functional reach: (standing)  Reaches £  6 in = High Fall Risk  Reaches 6-10 in = Moderate Fall Risk    Reaches ³  10 in = Low Fall Risk  Obed Robb et al. Functional reach: a new clinical measure of balance. [facility] Precious Rosenberg; 39: [id number]. G codes: In compliance with CMSs Claims Based Outcome Reporting, the following G-code set was chosen for this patient based on their primary functional limitation being treated: The outcome measure chosen to determine the severity of the functional limitation was the Functional reach with a score of 7.33\" which was correlated with the impairment scale.       · Mobility - Walking and Moving Around:               - CURRENT STATUS:    CL - 60%-79% impaired, limited or restricted               - GOAL STATUS:           CK - 40%-59% impaired, limited or restricted               - D/C STATUS:                       ---------------To be determined---------------      Physical Therapy Evaluation Charge Determination   History Examination Presentation Decision-Making   LOW Complexity : Zero comorbidities / personal factors that will impact the outcome / POC LOW Complexity : 1-2 Standardized tests and measures addressing body structure, function, activity limitation and / or participation in recreation  LOW Complexity : Stable, uncomplicated  LOW Complexity : FOTO score of       Based on the above components, the patient evaluation is determined to be of the following complexity level: LOW      Pain:  Pain Scale 1: Numeric (0 - 10)  Pain Intensity 1: 0  Patient tolerated up at edge of bed x 10 minutes only post ambulation     After treatment:   [ ]         Patient left in no apparent distress sitting up in chair  [X]         Patient left in no apparent distress in bed  [X]         Call bell left within reach  [X]         Nursing notified  [ ]         Caregiver present  [ ]         Bed alarm activated      COMMUNICATION/EDUCATION:   The patients plan of care was discussed with: Occupational Therapist and Registered Nurse.  [X]         Fall prevention education was provided and the patient/caregiver indicated understanding. [X]         Patient/family have participated as able in goal setting and plan of care. [X]         Patient/family agree to work toward stated goals and plan of care. [ ]         Patient understands intent and goals of therapy, but is neutral about his/her participation. [ ]         Patient is unable to participate in goal setting and plan of care.      Thank you for this referral.  Amarilys Owens, PT   Time Calculation: 25 mins

## 2017-03-28 NOTE — PROGRESS NOTES
End of Shift Nursing Note    Bedside shift change report given to [facility] (oncoming nurse) by Lowell Sandoval. Lizbeth Kauffman RN (offgoing nurse). Report included the following information SBAR. [facility] Phone:   [phone]    Significant changes during shift:    none   Non-emergent issues for physician to address:   none     Number times ambulated in hallway past shift: in room      Number of times OOB to chair past shift: 1    POD #: none     Vital Signs:    Temp: 98.8 °F (37.1 °C)     Pulse (Heart Rate): 93     BP: 146/75     Resp Rate: 18     O2 Sat (%): 99 %    Lines & Drains:     Urinary Catheter? No     Central Line? No     PICC Line? No       NG tube [] in [] removed [x] not applicable   Drains [] in [] removed [x] not applicable     Skin Integrity:      Wounds: no   Dressings Present: yes    Wound Concerns: no      GI:    Current diet:  DIET CARDIAC Regular; No Conc. Sweets    Nausea: NO  Vomiting: NO  Bowel Sounds: YES  Flatus: YES  Last Bowel Movement: yesterday   Appearance: loose    Respiratory:  Supplemental O2: No      Device: RA      Incentive Spirometer: YES  Volume: fair  Coughing and Deep Breathing: YES  Oral Care: YES  Understanding (patient/family education): YES   Getting out of bed: YES  Head of bed elevation: YES    Patient Safety:    Falls Score: 3  Mobility Score: 1  Bed Alarm On? No  Sitter? No      Opportunity for questions and clarification was given to oncoming nurse. Patient bed is in supine position, side rails are up x 2, door & observation blinds open as needed, call bell within reach and patient not in distress.     Mary Pacheco RN

## 2017-03-28 NOTE — PROGRESS NOTES
Excision Procedure Note    Procedure Date: 3/24/2017    Pre-operative diagnosis:  Leg skin lesions  Post-operative diagnosis:  Same  Procedure: punch biopsy x2 of right calf skin lesion       Time out at performed by me (see consent form):  * Patient was identified by name and date of birth   * Agreement on procedure being performed was verified  * Risks and Benefits explained to the patient  * Procedure site verified and marked as necessary  * Patient was positioned for comfort    Anesthesia: Local    Procedure Details: The area was prepped with betadine and draped in the usual manner. Local anesthesia was infiltrated into the skin and soft tissues surrounding the lesion. 2 punch biopsies were taken, full thickness overlapping lesion and grossly normal skin. One sent for tissue culture and GS, fungal culture and AFB. The other for path. The incision was packed with Surgicel and dressed with sterile gauze and tape. Well tolerated. Estimated Blood Loss:  minimal    Disposition:  Procedure well tolerated. Post-procedure pain scale: 2/10.     Signed By: Natalie Lamb MD

## 2017-03-28 NOTE — CONSULTS
RHEUMATOLOGY CONSULT     Pt seen and examined and full note to follow. IMPRESSION:    PAINFUL LEG ULCERS- NOT C/W E. NODOSUM. R/O INFECTION, VASCULITIS, CRYOGLOBULIN, ANTIPHOSPHOLIPID. Recommend:   Plastic surgery consult for \"DEEP TISSUE BIOPSY\" OF FRESH ULCER-   To R/O vasculitis. Since pt is somewhat immunosuppressed, I would be hesitant to use steroids or other immunosuppressants.

## 2017-03-28 NOTE — PROGRESS NOTES
ID Progress Note    Subjective:   Daily Progress Note: 3/28/2017 1:49 PM    DX:    1. HIV infection. Longstanding with no prior OIs. Elevated Viral Load(300,000) discordant with stable CD4 cts of over 300. No HAART recently or for any length of time in the past.      Would not begin HAART until genotype is back. Can afford to wait and see her as an outpatient to do this, which is also a test of her compliance.      2. HCV infection. May well be past the point of treatment with newer agents. Already cirrhotic with ascites. SBP being ruled out.     3. Leg lesions. ?vasculitic      Review of Systems: Afebrile. Leg pain same.  - fever, sweats, or chills. -weight loss. +fatigue/malasie. +Rash. -Swollen glands. -Oral lesions. -Photophobia. -Jaundice. - SOB. - cough. +abdominal pain or tenderness. -Nausea or vomiting. - Diarrhea. -No joint inflammation or swelling. No headache or AMS. + bilateral leg pain and lesions. ROS otherwise negative. CURRENT ANTIMICROBIALS: ceftriaxone. Objective:     Visit Vitals    /75 (BP 1 Location: Right arm, BP Patient Position: At rest)    Pulse 93    Temp 98.8 °F (37.1 °C)    Resp 18    Ht 5' 7\" (1.702 m)    Wt 160 lb (72.6 kg)    SpO2 99%    Breastfeeding No    BMI 25.06 kg/m2      O2 Device: Room air    Temp (24hrs), Av.8 °F (37.1 °C), Min:98 °F (36.7 °C), Max:99.6 °F (37.6 °C)  General: Afebrile and not toxic. No exanthem or enanthem. Diffuse shotty peripheral adenopathy. Alert and oriented x3. All IV sites are clean and dry. HEENT: EOMI. Anicteric. Oral mucosa normal. Conjunctivae normal. Neck supple. No thrush. Edentulous. Chest: Lungs clear to A&P. Regular rhythm without murmurs. No peripheral embolic phenomena. Abdomen: Soft without organomegaly, tenderness, or masses. + distended and taut with ascites. Musculoskeletal: No joint inflammation or effusions. Trace edema.  Nail beds normal.  Neurologic: Nonfocal exam.  Wounds: multiple wounds on both lower legs, but not soles or dorsum of feet. Some significantly scarred. Others nodular and ulcerated. All are quite tender. No pus at sites. Data Review: Ascitic fluid gram stain and culture negative to date. Cell count does not look like bacterial infection. WBC = 3500. Blood cults negative. Crypto antigen neg. Immunoglobulins normal.      Recent Results (from the past 24 hour(s))   HGB & HCT    Collection Time: 03/27/17  5:47 PM   Result Value Ref Range    HGB 8.0 (L) 11.5 - 16.0 g/dL    HCT 26.5 (L) 35.0 - 47.0 %   LD    Collection Time: 03/27/17  5:47 PM   Result Value Ref Range     (H) 81 - 246 U/L   IMMUNOGLOBULINS, G/A/M, QT. Collection Time: 03/27/17  5:47 PM   Result Value Ref Range    Immunoglobulin G 3460 (H) 700 - 1600 mg/dL    Immunoglobulin A 807 (H) 70 - 400 mg/dL    Immunoglobulin M 327 (H) 40 - 870 mg/dL   METABOLIC PANEL, BASIC    Collection Time: 03/28/17  5:17 AM   Result Value Ref Range    Sodium 139 136 - 145 mmol/L    Potassium 3.6 3.5 - 5.1 mmol/L    Chloride 112 (H) 97 - 108 mmol/L    CO2 17 (L) 21 - 32 mmol/L    Anion gap 10 5 - 15 mmol/L    Glucose 94 65 - 100 mg/dL    BUN 7 6 - 20 MG/DL    Creatinine 0.58 0.55 - 1.02 MG/DL    BUN/Creatinine ratio 12 12 - 20      GFR est AA >60 >60 ml/min/1.73m2    GFR est non-AA >60 >60 ml/min/1.73m2    Calcium 7.6 (L) 8.5 - 10.1 MG/DL   CBC WITH AUTOMATED DIFF    Collection Time: 03/28/17  5:17 AM   Result Value Ref Range    WBC 3.5 (L) 3.6 - 11.0 K/uL    RBC 3.51 (L) 3.80 - 5.20 M/uL    HGB 7.8 (L) 11.5 - 16.0 g/dL    HCT 25.9 (L) 35.0 - 47.0 %    MCV 73.8 (L) 80.0 - 99.0 FL    MCH 22.2 (L) 26.0 - 34.0 PG    MCHC 30.1 30.0 - 36.5 g/dL    RDW 20.4 (H) 11.5 - 14.5 %    PLATELET 190 (L) 927 - 400 K/uL    NEUTROPHILS 53 32 - 75 %    LYMPHOCYTES 35 12 - 49 %    MONOCYTES 9 5 - 13 %    EOSINOPHILS 2 0 - 7 %    BASOPHILS 1 0 - 1 %    ABS. NEUTROPHILS 1.9 1.8 - 8.0 K/UL    ABS. LYMPHOCYTES 1.2 0.8 - 3.5 K/UL    ABS.  MONOCYTES 0.3 0.0 - 1.0 K/UL    ABS. EOSINOPHILS 0.1 0.0 - 0.4 K/UL    ABS. BASOPHILS 0.0 0.0 - 0.1 K/UL    RBC COMMENTS ANISOCYTOSIS  2+        RBC COMMENTS MICROCYTOSIS  2+        RBC COMMENTS HYPOCHROMIA  2+        RBC COMMENTS SPHEROCYTES  PRESENT        RBC COMMENTS TARGET CELLS  PRESENT       CRYPTOCOCCUS AG W/REFLEX TITER    Collection Time: 03/28/17  5:17 AM   Result Value Ref Range    Cryptococcus Ag NEGATIVE  NEG     ALBUMIN    Collection Time: 03/28/17  5:17 AM   Result Value Ref Range    Albumin 1.8 (L) 3.5 - 5.0 g/dL   GLUCOSE, POC    Collection Time: 03/28/17  8:13 AM   Result Value Ref Range    Glucose (POC) 96 65 - 100 mg/dL    Performed by [employer] (PCT)    GLUCOSE, POC    Collection Time: 03/28/17 11:44 AM   Result Value Ref Range    Glucose (POC) 175 (H) 65 - 100 mg/dL    Performed by [employer] (PCT)          Assessment/Plan:    Awaiting genotype before considering HAART. CD4 count reasonable in spite of very high viral load. Agree with skin biopsy, have asked pathologist to do special stains on the tissue as well. Can stop all antibiotics if ascites cultures are negative tomorrow.

## 2017-03-29 ENCOUNTER — PATIENT OUTREACH (OUTPATIENT)
Dept: FAMILY MEDICINE CLINIC | Age: 62
End: 2017-03-29

## 2017-03-29 LAB
1,25(OH)2D3 SERPL-MCNC: 77.1 PG/ML (ref 19.9–79.3)
AFP-TM SERPL-MCNC: 9.2 NG/ML (ref 0–8.3)
ALBUMIN SERPL BCP-MCNC: 1.7 G/DL (ref 3.5–5)
ALBUMIN SERPL ELPH-MCNC: 2 G/DL (ref 2.9–4.4)
ALBUMIN/GLOB SERPL: 0.3 {RATIO} (ref 1.1–2.2)
ALBUMIN/GLOB SERPL: 0.4 {RATIO} (ref 0.7–1.7)
ALP SERPL-CCNC: 117 U/L (ref 45–117)
ALPHA1 GLOB SERPL ELPH-MCNC: 0.2 G/DL (ref 0–0.4)
ALPHA2 GLOB SERPL ELPH-MCNC: 0.4 G/DL (ref 0.4–1)
ALT SERPL-CCNC: 35 U/L (ref 12–78)
ANA SER QL: NEGATIVE
ANION GAP BLD CALC-SCNC: 11 MMOL/L (ref 5–15)
AST SERPL W P-5'-P-CCNC: 61 U/L (ref 15–37)
B-GLOBULIN SERPL ELPH-MCNC: 1.1 G/DL (ref 0.7–1.3)
B2 MICROGLOB SERPL-MCNC: 4.8 MG/L (ref 0.6–2.4)
BASOPHILS # BLD AUTO: 0 K/UL (ref 0–0.1)
BASOPHILS # BLD: 1 % (ref 0–1)
BILIRUB SERPL-MCNC: 0.4 MG/DL (ref 0.2–1)
BUN SERPL-MCNC: 8 MG/DL (ref 6–20)
BUN/CREAT SERPL: 11 (ref 12–20)
C-ANCA TITR SER IF: NORMAL TITER
C3 SERPL-MCNC: 57 MG/DL (ref 82–167)
CALCIUM SERPL-MCNC: 7.7 MG/DL (ref 8.5–10.1)
CARDIOLIPIN IGA SER IA-ACNC: <9 APL U/ML (ref 0–11)
CARDIOLIPIN IGG SER IA-ACNC: <9 GPL U/ML (ref 0–14)
CARDIOLIPIN IGM SER IA-ACNC: 10 MPL U/ML (ref 0–12)
CHLORIDE SERPL-SCNC: 111 MMOL/L (ref 97–108)
CMV IGG SERPL IA-ACNC: >10 U/ML (ref 0–0.59)
CO2 SERPL-SCNC: 18 MMOL/L (ref 21–32)
CREAT SERPL-MCNC: 0.7 MG/DL (ref 0.55–1.02)
DIFFERENTIAL METHOD BLD: ABNORMAL
ENA SS-A AB SER-ACNC: <0.2 AI (ref 0–0.9)
ENA SS-B AB SER-ACNC: <0.2 AI (ref 0–0.9)
EOSINOPHIL # BLD: 0.1 K/UL (ref 0–0.4)
EOSINOPHIL NFR BLD: 4 % (ref 0–7)
ERYTHROCYTE [DISTWIDTH] IN BLOOD BY AUTOMATED COUNT: 21.3 % (ref 11.5–14.5)
EST. AVERAGE GLUCOSE BLD GHB EST-MCNC: 120 MG/DL
GAMMA GLOB SERPL ELPH-MCNC: 3.9 G/DL (ref 0.4–1.8)
GLOBULIN SER CALC-MCNC: 5.7 G/DL (ref 2.2–3.9)
GLOBULIN SER CALC-MCNC: 6.2 G/DL (ref 2–4)
GLUCOSE BLD STRIP.AUTO-MCNC: 115 MG/DL (ref 65–100)
GLUCOSE BLD STRIP.AUTO-MCNC: 121 MG/DL (ref 65–100)
GLUCOSE BLD STRIP.AUTO-MCNC: 142 MG/DL (ref 65–100)
GLUCOSE BLD STRIP.AUTO-MCNC: 181 MG/DL (ref 65–100)
GLUCOSE SERPL-MCNC: 119 MG/DL (ref 65–100)
HAV AB SER QL IA: POSITIVE
HBA1C MFR BLD: 5.8 % (ref 4.2–6.3)
HCT VFR BLD AUTO: 25.9 % (ref 35–47)
HGB BLD-MCNC: 7.9 G/DL (ref 11.5–16)
LA PPP-IMP: NORMAL
LYMPHOCYTES # BLD AUTO: 35 % (ref 12–49)
LYMPHOCYTES # BLD: 1.2 K/UL (ref 0.8–3.5)
M PROTEIN SERPL ELPH-MCNC: ABNORMAL G/DL
MAGNESIUM SERPL-MCNC: 1.8 MG/DL (ref 1.6–2.4)
MCH RBC QN AUTO: 23 PG (ref 26–34)
MCHC RBC AUTO-ENTMCNC: 30.5 G/DL (ref 30–36.5)
MCV RBC AUTO: 75.5 FL (ref 80–99)
MONOCYTES # BLD: 0.4 K/UL (ref 0–1)
MONOCYTES NFR BLD AUTO: 10 % (ref 5–13)
MYELOPEROXIDASE AB SER IA-ACNC: <9 U/ML (ref 0–9)
NEUTS SEG # BLD: 1.8 K/UL (ref 1.8–8)
NEUTS SEG NFR BLD AUTO: 50 % (ref 32–75)
P-ANCA ATYPICAL TITR SER IF: NORMAL TITER
P-ANCA TITR SER IF: NORMAL TITER
PLATELET # BLD AUTO: 134 K/UL (ref 150–400)
POTASSIUM SERPL-SCNC: 3.8 MMOL/L (ref 3.5–5.1)
PROT SERPL-MCNC: 7.7 G/DL (ref 6–8.5)
PROT SERPL-MCNC: 7.9 G/DL (ref 6.4–8.2)
PROTEINASE3 AB SER IA-ACNC: <3.5 U/ML (ref 0–3.5)
RBC # BLD AUTO: 3.43 M/UL (ref 3.8–5.2)
RBC MORPH BLD: ABNORMAL
RBC MORPH BLD: ABNORMAL
SCREEN APTT: 41.5 SEC (ref 0–43.6)
SCREEN DRVVT: 38.8 SEC (ref 0–44)
SEE BELOW:, 164879: NORMAL
SERVICE CMNT-IMP: ABNORMAL
SODIUM SERPL-SCNC: 140 MMOL/L (ref 136–145)
T GONDII IGG SERPL IA-ACNC: <3 IU/ML (ref 0–7.1)
WBC # BLD AUTO: 3.5 K/UL (ref 3.6–11)

## 2017-03-29 PROCEDURE — 74011250637 HC RX REV CODE- 250/637: Performed by: HOSPITALIST

## 2017-03-29 PROCEDURE — 80053 COMPREHEN METABOLIC PANEL: CPT | Performed by: INTERNAL MEDICINE

## 2017-03-29 PROCEDURE — 36415 COLL VENOUS BLD VENIPUNCTURE: CPT | Performed by: INTERNAL MEDICINE

## 2017-03-29 PROCEDURE — 74011250636 HC RX REV CODE- 250/636: Performed by: HOSPITALIST

## 2017-03-29 PROCEDURE — 74011250636 HC RX REV CODE- 250/636: Performed by: INTERNAL MEDICINE

## 2017-03-29 PROCEDURE — 74011250637 HC RX REV CODE- 250/637: Performed by: INTERNAL MEDICINE

## 2017-03-29 PROCEDURE — 83036 HEMOGLOBIN GLYCOSYLATED A1C: CPT | Performed by: INTERNAL MEDICINE

## 2017-03-29 PROCEDURE — 65270000029 HC RM PRIVATE

## 2017-03-29 PROCEDURE — 82962 GLUCOSE BLOOD TEST: CPT

## 2017-03-29 PROCEDURE — 74011000258 HC RX REV CODE- 258: Performed by: INTERNAL MEDICINE

## 2017-03-29 PROCEDURE — 83735 ASSAY OF MAGNESIUM: CPT | Performed by: INTERNAL MEDICINE

## 2017-03-29 PROCEDURE — C9113 INJ PANTOPRAZOLE SODIUM, VIA: HCPCS | Performed by: HOSPITALIST

## 2017-03-29 PROCEDURE — 74011000250 HC RX REV CODE- 250: Performed by: HOSPITALIST

## 2017-03-29 PROCEDURE — 85025 COMPLETE CBC W/AUTO DIFF WBC: CPT | Performed by: INTERNAL MEDICINE

## 2017-03-29 RX ORDER — SPIRONOLACTONE 25 MG/1
100 TABLET ORAL DAILY
Status: DISCONTINUED | OUTPATIENT
Start: 2017-03-30 | End: 2017-03-29

## 2017-03-29 RX ORDER — OXYCODONE HYDROCHLORIDE 5 MG/1
10 TABLET ORAL
Status: DISCONTINUED | OUTPATIENT
Start: 2017-03-29 | End: 2017-03-30 | Stop reason: HOSPADM

## 2017-03-29 RX ORDER — SPIRONOLACTONE 25 MG/1
75 TABLET ORAL DAILY
Status: DISCONTINUED | OUTPATIENT
Start: 2017-03-30 | End: 2017-03-30 | Stop reason: HOSPADM

## 2017-03-29 RX ADMIN — Medication 325 MG: at 08:22

## 2017-03-29 RX ADMIN — CEFTRIAXONE 1 G: 1 INJECTION, POWDER, FOR SOLUTION INTRAMUSCULAR; INTRAVENOUS at 08:22

## 2017-03-29 RX ADMIN — Medication 10 ML: at 21:53

## 2017-03-29 RX ADMIN — TRIAMCINOLONE ACETONIDE: 1 CREAM TOPICAL at 08:25

## 2017-03-29 RX ADMIN — PROPRANOLOL HYDROCHLORIDE 10 MG: 10 TABLET ORAL at 08:23

## 2017-03-29 RX ADMIN — HYDROCODONE BITARTRATE AND ACETAMINOPHEN 1 TABLET: 10; 325 TABLET ORAL at 08:17

## 2017-03-29 RX ADMIN — OXYCODONE HYDROCHLORIDE 10 MG: 5 TABLET ORAL at 16:50

## 2017-03-29 RX ADMIN — OXYCODONE HYDROCHLORIDE 10 MG: 5 TABLET ORAL at 21:56

## 2017-03-29 RX ADMIN — SPIRONOLACTONE 50 MG: 25 TABLET, FILM COATED ORAL at 08:22

## 2017-03-29 RX ADMIN — LACTULOSE 10 G: 10 SOLUTION ORAL at 08:24

## 2017-03-29 RX ADMIN — SODIUM CHLORIDE 40 MG: 9 INJECTION INTRAMUSCULAR; INTRAVENOUS; SUBCUTANEOUS at 21:52

## 2017-03-29 RX ADMIN — TRIAMCINOLONE ACETONIDE: 1 CREAM TOPICAL at 17:00

## 2017-03-29 RX ADMIN — Medication 10 ML: at 08:25

## 2017-03-29 RX ADMIN — LACTULOSE 10 G: 10 SOLUTION ORAL at 17:00

## 2017-03-29 RX ADMIN — UMECLIDINIUM 1 PUFF: 62.5 AEROSOL, POWDER ORAL at 08:25

## 2017-03-29 RX ADMIN — SENNOSIDES 8.6 MG: 8.6 TABLET, FILM COATED ORAL at 08:23

## 2017-03-29 RX ADMIN — Medication 325 MG: at 16:50

## 2017-03-29 RX ADMIN — SODIUM CHLORIDE 40 MG: 9 INJECTION INTRAMUSCULAR; INTRAVENOUS; SUBCUTANEOUS at 08:23

## 2017-03-29 RX ADMIN — PROPRANOLOL HYDROCHLORIDE 10 MG: 10 TABLET ORAL at 21:53

## 2017-03-29 RX ADMIN — OXYCODONE HYDROCHLORIDE 10 MG: 5 TABLET ORAL at 12:50

## 2017-03-29 NOTE — PROGRESS NOTES
CM contacted [address] [place] [address] regarding referral sent and the  said that [facility] is owned by [facility]. Referral resubmitted to [facility] via allscripts. CM spoke with  for [facility] and also updated pt's daughter.      Marget Apgar, [address]

## 2017-03-29 NOTE — PROGRESS NOTES
End of Shift Nursing Note    Bedside shift change report given to Liza Andrade (oncoming nurse) by Fidencio Villeda (offgoing nurse). Report included the following information SBAR, Kardex, Procedure Summary and Recent Results. [facility] Phone:   [phone]    Significant changes during shift:    none   Non-emergent issues for physician to address:   none     Number times ambulated in hallway past shift: 1      Number of times OOB to chair past shift: up ad jeremías    POD #: n/a     Vital Signs:    Temp: 98 °F (36.7 °C)     Pulse (Heart Rate): 85     BP: 167/88     Resp Rate: 18     O2 Sat (%): 99 %    Lines & Drains:     Urinary Catheter? No   Placement Date:    Medical Necessity:   Central Line? No   Placement Date:    Medical Necessity:   PICC Line? No   Placement Date:    Medical Necessity:     NG tube [] in [] removed [x] not applicable   Drains [] in [] removed [x] not applicable     Skin Integrity:      Wounds: yes   Dressings Present: yes    Wound Concerns: no      GI:    Current diet:  DIET CARDIAC Regular; No Conc. Sweets    Nausea: NO  Vomiting: NO  Bowel Sounds: YES  Flatus: YES  Last Bowel Movement: today   Appearance:     Respiratory:  Supplemental O2: No      Device:    via  Liters/min     Incentive Spirometer: YES  Volume:   Coughing and Deep Breathing: YES  Oral Care: NO  Understanding (patient/family education): YES   Getting out of bed: YES  Head of bed elevation: YES    Patient Safety:    Falls Score: 3  Mobility Score: 1  Bed Alarm On? No  Sitter? No      Opportunity for questions and clarification was given to oncoming nurse. Patient bed is in low position, side rails are up x 2, door & observation blinds open as needed, call bell within reach and patient not in distress.     Queen Nadege RN

## 2017-03-29 NOTE — PROGRESS NOTES
Met with pt and discussed home health and pt wanted me to speak with her daughter. CM contacted pt's daughter Danay Humphreys ([phone]) and [facility]. FOC completed. CM will send referral to [facility] via [email or web address]. CM will update pt's record with the daughter's phone number.      Melody Ortega, [phone]

## 2017-03-29 NOTE — ADT AUTH CERT NOTES
RECONSIDERATION  [facility]  [phone]      Anemia, Iron Deficiency or Unspecified - Care Day 4 (3/28/2017) by Chery Driver        Review Status Review Entered       Completed 3/29/2017       Details              Care Day: 4 Care Date: 3/28/2017 Level of Care: Inpatient Floor       Guideline Day 2        Clinical Status       (X) * Hemodynamic stability       3/29/2017 10:05 AM EDT by Ysabel Dennis         98.8-149/77-05-% RA              ( ) * Active blood loss absent       ( ) * Signs and symptoms of anemia improved or absent       ( ) * Hgb level acceptable and stable       3/29/2017 10:05 AM EDT by Ysabel Dennis         Hgb 8.1 hct 26.4              ( ) * Underlying disorder absent or controlled       ( ) * Discharge plans and education understood              Activity       ( ) * Ambulatory [G]              Routes       (X) * Oral hydration, medications, and diet              Interventions       (X) Hgb/Hct                                   * Milestone              Additional Notes       Infectious disease - Assessment/Plan:               Awaiting genotype before considering HAART. CD4 count reasonable in spite of very high viral load.               Agree with skin biopsy, have asked pathologist to do special stains on the tissue as well.               Can stop all antibiotics if ascites cultures are negative tomorrow       Assessment / Plan:       Liver Cirrhosis/Chronic Hep C: as part of her treatment needs PPI, BB and spironolactone, will start those and monitor.       Acute on chronic blood loss anemia: no louis bleeding at this time, c/w iron supplement,. S/p 3 units PRBC.       ?SBP: c/w CTX/Vancomycin, Fluid no organism seen but high cellularity.       Abnormal U/A: UTI ruled out, culture shows no growth.       Hypokalemia monitor.       Hx of HIV and AIDs  Dr. Lenak Rhoades in the case, plan to follow as outpatient.       -not on meds, diagnosed 20 yrs ago at [facility] [place].  Had treatment for sometime at [facility] [place], however lost to follow up and had not been on antiretroviral meds for years.       -she is at risk for opportunistic infections       CD4 counts 363 with high viral load. Hepatitis/C panel, RPR ordered       -cxr neg        B/l LE rash with painful ulcer likely related to erythema nodosum vs cryoglobulinemia related to chronic Hep C       -Erythema had decreased while on ABX, wound still present       -RF/CRP neg, SED 85       -f/u with [facility], compliments       - consult rheum for further assistant       T2DM A1C 5.4 c/w SSI       COPD Stable, cont' home inhalers              Rheumatology - IMPRESSION:        PAINFUL LEG ULCERS- NOT C/W E. NODOSUM.       R/O INFECTION, VASCULITIS, CRYOGLOBULIN, ANTIPHOSPHOLIPID.               Recommend:       Plastic surgery consult for \"DEEP TISSUE BIOPSY\" OF FRESH ULCER-        To R/O vasculitis.                Since pt is somewhat immunosuppressed, I would be hesitant to use steroids or other immunosuppressants       glu 185        Skin/wound cultures pending       Cardiac diet       Daily weight       Rocephin iv x1       Norco po x3       Protonix iv x2       Propranolol po x2       Aldactone po x1           Anemia, Iron Deficiency or Unspecified - Care Day 3 (3/27/2017) by Royal Heading        Review Status Review Entered       Completed 3/29/2017       Details              Care Day: 3 Care Date: 3/27/2017 Level of Care: Inpatient Floor       Guideline Day 2        Clinical Status       ( ) * Hemodynamic stability       3/29/2017 9:52 AM EDT by Katie Fuchs         98.5-152/-45-99% RA              ( ) * Active blood loss absent       ( ) * Signs and symptoms of anemia improved or absent       ( ) * Hgb level acceptable and stable       3/29/2017 9:52 AM EDT by Katie Fuchs         hgb 7.6 hct 25.1              ( ) * Underlying disorder absent or controlled       ( ) * Discharge plans and education understood              Activity       ( ) * Ambulatory [G]              Routes       (X) * Oral hydration, medications, and diet              Interventions       (X) Hgb/Hct                                   * Milestone              Additional Notes       98.5-152/-79-99% RA       Infectious disease - ASSESSMENT       1. HIV infection. Longstanding with no prior OIs. Elevated Viral Load discordant with stable CD4 cts of over 300. No HAART recently or for any length of time in the past.                Doubt related to leg lesions, but will check for OI.                Would not begin HAART until genotype is back. Can afford to wait and see her as an outpatient to do this, which is also a test of her compliance.                Additional serologies will be ordered.               2. HCV infection. May well be past the point of treatment with newer agents. Already cirrhotic with ascites. SBP being ruled out.               3. Leg lesions. Have some clinical characteristics of E. Nodosum or other vasculitis related disease. May need biopsy to zero in on diagnosis. Rheumatology is evaluating. Agree with stopping antibiotics if paracentesis fluid is negative.       Assessment / Plan:       Liver Cirrhosis       Chronic Hep C       Acute on chronic blood loss anemia       ?SBP       -s/p 3 units prbc       -trend H/H, transfuse for hgb<7        -hold antihypertensive meds due to risk of hemodynamic instability       -iron suppl       -s/p paracentesis with labs sent, ID rec stop abx if paracentesis is neg               Hypokalemia       -K 3.3, will replete       -monitor       Hx of HIV and AIDs       -not on meds, diagnosed 20 yrs ago at [facility] [place]. Had treatment for sometime at [facility], however lost to follow up and had not been on antiretroviral meds for years.       -she is at risk for opportunistic infections       CD4 counts 363 with high viral load.  Hepatitis/C panel, RPR ordered       -cxr neg        -ID consulted, needs to f/u outpt in 3 weeks to start meds outpt               UTI       -UA consistent with UTI with c/o urinary symptoms. Pt was already on broad spectrum abx: vancomycin/rocephin/levaquin on admission for UTI and ?cellulitis rx.  She completed 3 days of rocephin.               B/l LE rash with painful ulcer likely related to erythema nodosum vs cryoglobulinemia related to chronic Hep C       -does not appear to be cellulitis, so will discontinue abx       -RF/CRP neg, SED 85       -f/u with [facility], compliments       - consult rheum for further assistant        T2DM       -A1C 5.4       -SSI               COPD        -stable       -cont' home inhalers              Pot 3.3 glu 108 Ca 7.4 rbc 3.42 hgb 7.6 hct 25.1 plt 134 cell count body fluid 274        Anerobic/body fluid cultures pending       US paracentesis - Successful ultrasound guided paracentesis yielding approximately 60       ml of fluid.       PT eval and treat       Consult PT/OT       Consult general surgery       Cardiac diet       Norco po x3       Protonix iv x2       Pot chlor po x1       Rocephin iv x1

## 2017-03-29 NOTE — PROGRESS NOTES
Hospitalist Progress Note    NAME: Jm Rocha   :  1955   MRN:  [id number]       Interim Hospital Summary: 57 y.o. female whom presented on 3/24/2017 with      Assessment / Plan:  Liver Cirrhosis/Chronic Hep C: c/w  PPI, BB and increase spironolactone,  Monitor. Fluid cytology is benign, although AFP is slightly high, eventually will need liver biopsy. , patient has anasarca related to liver disease and hypoalbuminemia that is the reason for her pitting edema. Acute on chronic blood loss anemia: no louis bleeding at this time, c/w iron supplement,. S/p 3 units PRBC. ?SBP: c/w CTX/Vancomycin, Fluid no organism seen but high cellularity. Cytology negative, abdomen tender  Abnormal U/A: UTI ruled out, culture shows no growth. Hypokalemia monitor. Hx of HIV and AIDs  Dr. Lai Waller in the case, plan to follow as outpatient.  -not on meds, diagnosed 20 yrs ago at [facility] [place]. Had treatment for sometime at [facility], however lost to follow up and had not been on antiretroviral meds for years.  -she is at risk for opportunistic infections  CD4 counts 363 with high viral load. Hepatitis/C panel, RPR ordered  -cxr neg    B/l LE rash with painful ulcer likely related to erythema nodosum vs cryoglobulinemia related to chronic Hep C  -Erythema had decreased while on ABX, wound still present  -RF/CRP neg, SED 85  -f/u with [facility], compliments, as per Rheumatology no Erythema Nodosum, but may be Cryoglobulinemia/Molluscum? ? Biopsy done, will f/u results. - consult rheum for further assistant  T2DM A1C 5.4  c/w SSI  COPD  Stable, cont' home inhalers  Code status: Full  Prophylaxis: scds  Recommended Disposition: Home Health services    If patient is stable may be able to D/c Home with [address] [place] [address] tomorrow and F/U as outpatient with GI, ID and Rheumatology will need wound care. Subjective:     Chief Complaint / Reason for Physician Visit  \"My leg hurts\"   Discussed with RN events overnight.      Review of Systems:  Symptom Y/N Comments  Symptom Y/N Comments   Fever/Chills n   Chest Pain n    Poor Appetite    Edema y    Cough n   Abdominal Pain n    Sputum n   Joint Pain y    SOB/HUNTLEY n   Pruritis/Rash     Nausea/vomit    Tolerating PT/OT     Diarrhea n   Tolerating Diet y    Constipation    Other       Could NOT obtain due to:      Objective:     VITALS:   Last 24hrs VS reviewed since prior progress note. Most recent are:  Patient Vitals for the past 24 hrs:   Temp Pulse Resp BP SpO2   03/29/17 1136 98.4 °F (36.9 °C) 80 18 133/66 98 %   03/29/17 0825 - 88 - 149/76 -   03/29/17 0823 - 88 - 149/76 -   03/29/17 0822 - 88 - 149/76 -   03/29/17 0820 98 °F (36.7 °C) 88 18 149/76 100 %   03/29/17 0342 98.9 °F (37.2 °C) 84 18 148/88 98 %   03/29/17 0003 98.4 °F (36.9 °C) 82 18 155/88 99 %   03/28/17 2014 98 °F (36.7 °C) 85 18 167/88 99 %   03/28/17 1538 98.7 °F (37.1 °C) 89 17 159/75 100 %       Intake/Output Summary (Last 24 hours) at 03/29/17 1151  Last data filed at 03/29/17 1143   Gross per 24 hour   Intake                0 ml   Output             1150 ml   Net            -1150 ml        PHYSICAL EXAM:  General: Awake and cooperative, NAD    EENT:  EOMI. Anicteric sclerae. MMM  Resp:  CTA bilaterally, no wheezing or rales. No accessory muscle use  CV:  Regular  rhythm,  No edema  GI:  Soft, + distended, mild  Tender, ascites.  +Bowel sounds  Neurologic:  Alert and oriented X 3, normal speech,   Psych:   Good insight. Not anxious nor agitated  Skin:  Nonblanchable rash on b/l LE R>L, multiple ulcers on LE in different stage of healing. Scarring on L leg from large previous ulcer. RLE ulcer painful on palpation. No drainage, erythema seen on legs other than rash.     Reviewed most current lab test results and cultures  YES  Reviewed most current radiology test results   YES  Review and summation of old records today    NO  Reviewed patient's current orders and MAR    YES  PMH/SH reviewed - no change compared to H&P  ________________________________________________________________________  Care Plan discussed with:    Comments   Patient x    Family      RN x    Care Manager     Consultant                        Multidiciplinary team rounds were held today with , nursing, pharmacist and clinical coordinator. Patient's plan of care was discussed; medications were reviewed and discharge planning was addressed. ________________________________________________________________________  Total NON critical care TIME:  35   Minutes    Total CRITICAL CARE TIME Spent:   Minutes non procedure based      Comments   >50% of visit spent in counseling and coordination of care x    ________________________________________________________________________  Virginia Moore MD     Procedures: see electronic medical records for all procedures/Xrays and details which were not copied into this note but were reviewed prior to creation of Plan. LABS:  I reviewed today's most current labs and imaging studies. Pertinent labs include:  Recent Labs      03/29/17 0357 03/28/17   1827  03/28/17   0517 03/27/17   0500   WBC  3.5*   --   3.5*   --   3.9   HGB  7.9*  8.1*  7.8*   < >  7.6*   HCT  25.9*  26.4*  25.9*   < >  25.1*   PLT  134*   --   124*   --   134*    < > = values in this interval not displayed.      Recent Labs      03/29/17   0357 03/28/17   0517  03/27/17   0500   NA  140  139  141   K  3.8  3.6  3.3*   CL  111*  112*  113*   CO2  18*  17*  18*   GLU  119*  94  108*   BUN  8  7  8   CREA  0.70  0.58  0.67   CA  7.7*  7.6*  7.4*   MG  1.8   --    --    ALB  1.7*  1.8*   --    TBILI  0.4   --    --    SGOT  61*   --    --    ALT  35   --    --        Signed: Virginia Moore MD

## 2017-03-29 NOTE — PROGRESS NOTES
End of Shift Nursing Note    Bedside shift change report given to Conrad Cha (oncoming nurse) by Estephania Caballero (offgoing nurse). Report included the following information SBAR, Kardex, Intake/Output, MAR and Recent Results. Zone Phone:   [phone]    Significant changes during shift:    0   Non-emergent issues for physician to address:   0     Number times ambulated in hallway past shift: 0      Number of times OOB to chair past shift: 0    POD #: 0     Vital Signs:    Temp: 98.4 °F (36.9 °C)     Pulse (Heart Rate): 80     BP: 133/66     Resp Rate: 18     O2 Sat (%): 98 %    Lines & Drains:     Urinary Catheter? No   Placement Date: 0   Medical Necessity: 0  Central Line? No   Placement Date: 0   Medical Necessity: 0  PICC Line? No   Placement Date: 0   Medical Necessity: 0    NG tube [] in [] removed [x] not applicable   Drains [] in [] removed [x] not applicable     Skin Integrity:      Wounds: yes   Dressings Present: yes    Wound Concerns: yes      GI:    Current diet:  DIET CARDIAC Regular; No Conc. Sweets    Nausea: NO  Vomiting: NO  Bowel Sounds: YES  Flatus: YES  Last Bowel Movement: today   Appearance: 0    Respiratory:  Supplemental O2: No      Device: 0   via 0 Liters/min     Incentive Spirometer: YES  Volume: 0  Coughing and Deep Breathing: YES  Oral Care: YES  Understanding (patient/family education): YES   Getting out of bed: YES  Head of bed elevation: YES    Patient Safety:    Falls Score: 1  Mobility Score: 1  Bed Alarm On? No  Sitter? No      Opportunity for questions and clarification was given to oncoming nurse. Patient bed is in low position, side rails are up x 2, door & observation blinds open as needed, call bell within reach and patient not in distress.     Lorenzo Vela RN

## 2017-03-29 NOTE — PROGRESS NOTES
Problem: Falls - Risk of  Goal: *Absence of falls  Outcome: Progressing Towards Goal  Call bell within reach, rafal upx3, BSC

## 2017-03-29 NOTE — CONSULTS
Rheumatology Consultation    Asked to see pt by Dr. Edie Tavarez for rash    IMPRESSION:   PAINFUL LEG ULCERS- NOT C/W E. NODOSUM. R/O INFECTION, VASCULITIS, CRYOGLOBULIN, ANTIPHOSPHOLIPID.     Recommend:  Plastic surgery consult for \"DEEP TISSUE BIOPSY\" OF FRESH ULCER-   To R/O vasculitis.     Since pt is somewhat immunosuppressed, I would be hesitant to use steroids or other immunosuppressants. HPI:  56 yo with hep C, HIV currently untreated, with rash that began ? 2011 with one leg ulcer and one periodically. She was admitted for constipation, a sudden development of very painful leg sores. She denies Raynaud's. Other rash other than slight rash on feet since admission. No alopecia, Sicca Sx, eye pain or inflammation, Lymph node enlargement, joint pain or swelling.   Also denies CP, SOB, other GI Sx    Active Ambulatory Problems     Diagnosis Date Noted    Degenerative Joint Disease 07/21/2011    Degenerative Disc Disease 07/21/2011    Chronic hepatitis C without mention of hepatic coma ([address] 07/21/2011    Insomnia 07/21/2011    Allergic rhinitis, cause unspecified 07/21/2011    HIV (human immunodeficiency virus infection) ([address] 07/21/2011    Pulmonary nodules 07/21/2011    Diabetes mellitus type 2, controlled ([address] 07/21/2011    Bruised ribs 07/21/2011    Sprain and strain of unspecified site of shoulder and upper arm 07/21/2011    Arthritis, Degenerative, Knee 07/21/2011    HTN (hypertension) 07/21/2011    Chronic airway obstruction, not elsewhere classified ([address] 12/23/2013    COPD exacerbation ([address] 12/23/2013    Boils 01/15/2015    Multiple open wounds of lower leg 12/03/2015     Resolved Ambulatory Problems     Diagnosis Date Noted    COPD exacerbation ([address] 12/16/2013     Past Medical History:   Diagnosis Date    Allergic rhinitis, cause unspecified 7/21/2011    Arthritis 7/21/2011    Asthma     Burning with urination     Chronic hepatitis C without mention of hepatic coma ([address] 7/21/2011    Degenerative Disc Disease 7/21/2011    Degenerative Joint Disease 7/21/2011    Diabetes ([facility]     HIV (human immunodeficiency virus infection) ([facility] 7/21/2011    Hypertension     Insomnia 7/21/2011    Multiple open wounds of lower leg 12/3/2015    Pulmonary nodules 7/21/2011       Current Facility-Administered Medications:     insulin lispro (HUMALOG) injection, , SubCUTAneous, AC&HS, Nguyen Kumar MD, Stopped at 03/28/17 1130    glucose chewable tablet 16 g, 4 Tab, Oral, PRN, Nguyen Kumar MD    dextrose (D50W) injection syrg 12.5-25 g, 12.5-25 g, IntraVENous, PRN, Nguyen Kumar MD    glucagon (GLUCAGEN) injection 1 mg, 1 mg, IntraMUSCular, PRN, Nguyen Kumar MD    ferrous sulfate tablet 325 mg, 1 Tab, Oral, BID WITH MEALS, Nguyen Kumar MD, 325 mg at 03/29/17 4462    spironolactone (ALDACTONE) tablet 50 mg, 50 mg, Oral, DAILY, Nguyen Kumar MD, 50 mg at 03/29/17 4355    propranolol (INDERAL) tablet 10 mg, 10 mg, Oral, BID, Nguyen Kumar MD, 10 mg at 03/29/17 2460    cefTRIAXone (ROCEPHIN) 1 g in 0.9% sodium chloride (MBP/ADV) 50 mL, 1 g, IntraVENous, Q24H, Dutch GENAO MD, Last Rate: 100 mL/hr at 03/29/17 9797, 1 g at 03/29/17 6342    umeclidinium (INCRUSE ELLIPTA) 62.5 mcg/actuation, 1 Puff, Inhalation, DAILY, Machelle Rodriguez MD, 1 Puff at 03/29/17 0825    triamcinolone acetonide (KENALOG) 0.1 % cream, , Topical, BID, Machelle Rodriguez MD    sodium chloride (NS) flush 5-10 mL, 5-10 mL, IntraVENous, Q8H, Machelle Rodriguez MD, 10 mL at 03/29/17 0825    sodium chloride (NS) flush 5-10 mL, 5-10 mL, IntraVENous, PRN, Machelle Rodriguez MD    glucose chewable tablet 16 g, 4 Tab, Oral, PRN, Machelle Rodriguez MD    dextrose (D50W) injection syrg 12.5-25 g, 12.5-25 g, IntraVENous, PRN, Machelle Rodriguez MD    glucagon (GLUCAGEN) injection 1 mg, 1 mg, IntraMUSCular, PRN, Machelle Rodriguez MD    pantoprazole (PROTONIX) 40 mg in sodium chloride 0.9 % 10 mL injection, 40 mg, IntraVENous, Q12H, Cynthia Fernandez MD, 40 mg at 03/29/17 0823    senna (SENOKOT) tablet 8.6 mg, 1 Tab, Oral, DAILY, Cynthia Fernandez MD, 8.6 mg at 03/29/17 9047    polyethylene glycol (MIRALAX) packet 17 g, 17 g, Oral, DAILY PRN, Cynthia Fernandez MD    lactulose (CHRONULAC) solution 10 g, 10 g, Oral, BID, Cynthia Fernandez MD, 10 g at 03/29/17 0824    HYDROcodone-acetaminophen (NORCO)  mg tablet 1 Tab, 1 Tab, Oral, Q6H PRN, Yenifer GENAO MD, 1 Tab at 03/29/17 0817    hydrALAZINE (APRESOLINE) 20 mg/mL injection 10 mg, 10 mg, IntraVENous, Q6H PRN, Merl Oas V, MD, 10 mg at 03/25/17 2209    Allergies   Allergen Reactions    Codeine Itching     Patient denies Codeine allergy    Penicillin V Hives     Patient denies penicillin allergy.  Pollen Extracts Runny Nose     Itchy, headaches, sneezing     Social History     Social History    Marital status: SINGLE     Spouse name: N/A    Number of children: N/A    Years of education: N/A     Occupational History    Not on file. Social History Main Topics    Smoking status: Current Every Day Smoker     Packs/day: 0.50     Years: 15.00     Types: Cigarettes    Smokeless tobacco: Never Used    Alcohol use No    Drug use: No    Sexual activity: Not Currently     Other Topics Concern    Not on file     Social History Narrative     Family History   Problem Relation Age of Onset    Diabetes Mother     Hypertension Mother     Diabetes Father      Blood pressure 149/76, pulse 88, temperature 98 °F (36.7 °C), resp. rate 18, height 5' 7\" (1.702 m), weight 69.7 kg (153 lb 10.6 oz), SpO2 100 %, not currently breastfeeding. ENT- sclera clear, oroph clear  NECK- supple, no TM  CHEST- clear  CV- RRR, no MRG  ABD- distended  EXT- no CCE, + leg ulcers and scars, bruising left med. epicondyle  LN- none =  PULSES 2+ =  SKIN- 1 cm ulcers and larger 2-3 cm scars lower ext- +TTP,  no nodules.   JOINTS- scar Left knee, deformity, no effusions  MYOFASCIAL PLANES- NT=    Recent Results (from the past 12 hour(s))   GLUCOSE, POC    Collection Time: 03/28/17  9:22 PM   Result Value Ref Range    Glucose (POC) 185 (H) 65 - 100 mg/dL    Performed by Slade Christianson*    CBC WITH AUTOMATED DIFF    Collection Time: 03/29/17  3:57 AM   Result Value Ref Range    WBC 3.5 (L) 3.6 - 11.0 K/uL    RBC 3.43 (L) 3.80 - 5.20 M/uL    HGB 7.9 (L) 11.5 - 16.0 g/dL    HCT 25.9 (L) 35.0 - 47.0 %    MCV 75.5 (L) 80.0 - 99.0 FL    MCH 23.0 (L) 26.0 - 34.0 PG    MCHC 30.5 30.0 - 36.5 g/dL    RDW 21.3 (H) 11.5 - 14.5 %    PLATELET 572 (L) 457 - 400 K/uL    NEUTROPHILS 50 32 - 75 %    LYMPHOCYTES 35 12 - 49 %    MONOCYTES 10 5 - 13 %    EOSINOPHILS 4 0 - 7 %    BASOPHILS 1 0 - 1 %    ABS. NEUTROPHILS 1.8 1.8 - 8.0 K/UL    ABS. LYMPHOCYTES 1.2 0.8 - 3.5 K/UL    ABS. MONOCYTES 0.4 0.0 - 1.0 K/UL    ABS. EOSINOPHILS 0.1 0.0 - 0.4 K/UL    ABS. BASOPHILS 0.0 0.0 - 0.1 K/UL    RBC COMMENTS ANISOCYTOSIS  2+        RBC COMMENTS HYPOCHROMIA  1+        DF SMEAR SCANNED     MAGNESIUM    Collection Time: 03/29/17  3:57 AM   Result Value Ref Range    Magnesium 1.8 1.6 - 2.4 mg/dL   METABOLIC PANEL, COMPREHENSIVE    Collection Time: 03/29/17  3:57 AM   Result Value Ref Range    Sodium 140 136 - 145 mmol/L    Potassium 3.8 3.5 - 5.1 mmol/L    Chloride 111 (H) 97 - 108 mmol/L    CO2 18 (L) 21 - 32 mmol/L    Anion gap 11 5 - 15 mmol/L    Glucose 119 (H) 65 - 100 mg/dL    BUN 8 6 - 20 MG/DL    Creatinine 0.70 0.55 - 1.02 MG/DL    BUN/Creatinine ratio 11 (L) 12 - 20      GFR est AA >60 >60 ml/min/1.73m2    GFR est non-AA >60 >60 ml/min/1.73m2    Calcium 7.7 (L) 8.5 - 10.1 MG/DL    Bilirubin, total 0.4 0.2 - 1.0 MG/DL    ALT (SGPT) 35 12 - 78 U/L    AST (SGOT) 61 (H) 15 - 37 U/L    Alk.  phosphatase 117 45 - 117 U/L    Protein, total 7.9 6.4 - 8.2 g/dL    Albumin 1.7 (L) 3.5 - 5.0 g/dL    Globulin 6.2 (H) 2.0 - 4.0 g/dL    A-G Ratio 0.3 (L) 1.1 - 2.2     GLUCOSE, POC    Collection Time: 03/29/17 8:10 AM   Result Value Ref Range    Glucose (POC) 121 (H) 65 - 100 mg/dL    Performed by Lucy Nice*      RAdiologic studies rev'd.

## 2017-03-29 NOTE — PROGRESS NOTES
ID Progress Note    Subjective:   Daily Progress Note: 3/29/2017 1:49 PM    DX:    1. HIV infection. Longstanding with no prior OIs. Elevated Viral Load(300,000) discordant with stable CD4 cts of over 300. No HAART recently or for any length of time in the past.      Would not begin HAART until genotype is back. Can afford to wait and see her as an outpatient to do this, which is also a test of her compliance.      2. HCV infection. May well be past the point of treatment with newer agents. Already cirrhotic with ascites. SBP being ruled out.     3. Leg lesions. ?vasculitic    4. ?SBP  Could be partially treated. Review of Systems: Afebrile. Leg pain same. Abdominal pain persists and is diffuse.  - fever, sweats, or chills. -weight loss. +fatigue/malasie. +Rash. -Swollen glands. -Oral lesions. -Photophobia. -Jaundice. - SOB. - cough. +abdominal pain or tenderness. -Nausea or vomiting. - Diarrhea. -No joint inflammation or swelling. No headache or AMS. + bilateral leg pain and lesions. ROS otherwise negative. CURRENT ANTIMICROBIALS: ceftriaxone. Objective:     Visit Vitals    /66 (BP 1 Location: Right arm)    Pulse 80    Temp 98.4 °F (36.9 °C)    Resp 18    Ht 5' 7\" (1.702 m)    Wt 153 lb 10.6 oz (69.7 kg)    SpO2 98%    Breastfeeding No    BMI 24.07 kg/m2      O2 Device: Room air    Temp (24hrs), Av.4 °F (36.9 °C), Min:98 °F (36.7 °C), Max:98.9 °F (37.2 °C)  General: Afebrile and not toxic. No exanthem or enanthem. Diffuse shotty peripheral adenopathy. Alert and oriented x3. All IV sites are clean and dry. HEENT: EOMI. Anicteric. Oral mucosa normal. Conjunctivae normal. Neck supple. No thrush. Edentulous. Chest: Lungs clear to A&P. Regular rhythm without murmurs. No peripheral embolic phenomena. Abdomen: Soft without organomegaly, +tenderness. + distended and taut with ascites. Musculoskeletal: No joint inflammation or effusions. Trace edema.  Nail beds normal.  Neurologic: Nonfocal exam.  Wounds: multiple wounds on both lower legs, but not soles or dorsum of feet. Some significantly scarred. Others nodular and ulcerated. All are quite tender. No pus at sites. Data Review: Ascitic fluid gram stain and culture negative to date. Cell count does not look like bacterial infection. WBC = 3500. Blood cults negative. Crypto antigen neg. Immunoglobulins normal.      Recent Results (from the past 24 hour(s))   GLUCOSE, POC    Collection Time: 03/28/17  3:40 PM   Result Value Ref Range    Glucose (POC) 148 (H) 65 - 100 mg/dL    Performed by Mitzy Gillespie (PCT)    CULTURE, TISSUE Elease Hussar STAIN    Collection Time: 03/28/17  6:24 PM   Result Value Ref Range    Special Requests: NO SPECIAL REQUESTS      GRAM STAIN NO WBC'S SEEN      GRAM STAIN NO ORGANISMS SEEN      Culture result: PENDING    HGB & HCT    Collection Time: 03/28/17  6:27 PM   Result Value Ref Range    HGB 8.1 (L) 11.5 - 16.0 g/dL    HCT 26.4 (L) 35.0 - 47.0 %   GLUCOSE, POC    Collection Time: 03/28/17  9:22 PM   Result Value Ref Range    Glucose (POC) 185 (H) 65 - 100 mg/dL    Performed by Shelby Montague*    HEMOGLOBIN A1C WITH EAG    Collection Time: 03/29/17  3:56 AM   Result Value Ref Range    Hemoglobin A1c 5.8 4.2 - 6.3 %    Est. average glucose 120 mg/dL   CBC WITH AUTOMATED DIFF    Collection Time: 03/29/17  3:57 AM   Result Value Ref Range    WBC 3.5 (L) 3.6 - 11.0 K/uL    RBC 3.43 (L) 3.80 - 5.20 M/uL    HGB 7.9 (L) 11.5 - 16.0 g/dL    HCT 25.9 (L) 35.0 - 47.0 %    MCV 75.5 (L) 80.0 - 99.0 FL    MCH 23.0 (L) 26.0 - 34.0 PG    MCHC 30.5 30.0 - 36.5 g/dL    RDW 21.3 (H) 11.5 - 14.5 %    PLATELET 606 (L) 122 - 400 K/uL    NEUTROPHILS 50 32 - 75 %    LYMPHOCYTES 35 12 - 49 %    MONOCYTES 10 5 - 13 %    EOSINOPHILS 4 0 - 7 %    BASOPHILS 1 0 - 1 %    ABS. NEUTROPHILS 1.8 1.8 - 8.0 K/UL    ABS. LYMPHOCYTES 1.2 0.8 - 3.5 K/UL    ABS. MONOCYTES 0.4 0.0 - 1.0 K/UL    ABS. EOSINOPHILS 0.1 0.0 - 0.4 K/UL    ABS.  BASOPHILS 0.0 0.0 - 0.1 K/UL    RBC COMMENTS ANISOCYTOSIS  2+        RBC COMMENTS HYPOCHROMIA  1+        DF SMEAR SCANNED     MAGNESIUM    Collection Time: 03/29/17  3:57 AM   Result Value Ref Range    Magnesium 1.8 1.6 - 2.4 mg/dL   METABOLIC PANEL, COMPREHENSIVE    Collection Time: 03/29/17  3:57 AM   Result Value Ref Range    Sodium 140 136 - 145 mmol/L    Potassium 3.8 3.5 - 5.1 mmol/L    Chloride 111 (H) 97 - 108 mmol/L    CO2 18 (L) 21 - 32 mmol/L    Anion gap 11 5 - 15 mmol/L    Glucose 119 (H) 65 - 100 mg/dL    BUN 8 6 - 20 MG/DL    Creatinine 0.70 0.55 - 1.02 MG/DL    BUN/Creatinine ratio 11 (L) 12 - 20      GFR est AA >60 >60 ml/min/1.73m2    GFR est non-AA >60 >60 ml/min/1.73m2    Calcium 7.7 (L) 8.5 - 10.1 MG/DL    Bilirubin, total 0.4 0.2 - 1.0 MG/DL    ALT (SGPT) 35 12 - 78 U/L    AST (SGOT) 61 (H) 15 - 37 U/L    Alk. phosphatase 117 45 - 117 U/L    Protein, total 7.9 6.4 - 8.2 g/dL    Albumin 1.7 (L) 3.5 - 5.0 g/dL    Globulin 6.2 (H) 2.0 - 4.0 g/dL    A-G Ratio 0.3 (L) 1.1 - 2.2     GLUCOSE, POC    Collection Time: 03/29/17  8:10 AM   Result Value Ref Range    Glucose (POC) 121 (H) 65 - 100 mg/dL    Performed by Roz Coyne*    GLUCOSE, POC    Collection Time: 03/29/17 11:49 AM   Result Value Ref Range    Glucose (POC) 142 (H) 65 - 100 mg/dL    Performed by Roz Coyne*          Assessment/Plan:    Awaiting genotype before considering HAART. CD4 count reasonable in spite of very high viral load. Skin biopsy results pending. Cytology negative. As to question of SBP: ascitic cell count not particularly diagnostic. Cultures negative, but was on antibiotics prior to parcentesis. To be safe, can treat with oral moxifloxacin for an additional 5 days at discharge. This covers over 90% of likely pathogens in SBP.

## 2017-03-29 NOTE — PROGRESS NOTES
DearNav Joseph :     I have reviewed the following case on 3/29/2017 :        Pt Name:  Kasia Ross   MR#  [id number]   CSN#   [id number]   [address] date  3/24/2017  9:07 PM   Discharge date     Discharging doctor     Ellen Sheriff MD   Admitting doctor    Principal diagnosis  Generalized abdominal pain        Insurance  Payor: [address] [place] [address] / Plan: [id number] [place] [id number] / Product Type: Managed Care Medicaid /      Clinicals    57 y.o. lady who was hospitalized for severe anemia. She also suffers from HIV infection, Hep C infection. She also has Cirrhosis of Liver. She received THREE units PRBC this episode of care for her severe anemia. She also is being treated for suspected SBP        [place]  Anemia, Iron Deficiency or Unspecified ORG: M-35 (ISC)     Decision I have setup a peer to peer with Dr. Javier Hendrix at the payer     3/30/2017 9:53 AM - approved inpt status for 03/24/17 through 03/29/17   Encouraging safe discharge plan.     Additional comments         Sincerely    Beto Vallecillo MD MPH FACP     Physician Advisor  [address]    [facility] Medical Staff, [facility]    Cell  [phone]

## 2017-03-29 NOTE — PROGRESS NOTES
Occupational Therapy    Nursing cleared and encouraged therapy. Educated patient on role of OT. \"I told the other girl, I am not getting up today. \"  Patient reporting her RLE was hurting and her stomach was tight. She reports she has been up with her grandson. Encouraged patient to call for staff to supervise OOB. Patient reports she is agreeable tomorrow if she gets more sleep tonight than last night. Will continue to follow for OT and encourage participation in ADL tasks.      Thank you,    Bo Wang OTR/LIS

## 2017-03-29 NOTE — PROGRESS NOTES
Pt refusing PT this afternoon, her right leg is hurting. She reports that she has been up ambulating in the room independently. Will follow up tomorrow.     Rupali Carlin,PT,MS

## 2017-03-29 NOTE — CDMP QUERY
Please clarify if this patient is being treated/managed for:    =>What is the most likely cause of pt's ongoing pitting edematous, open wounds with b/l petechial hemorrhages with inconclusive punch biopsies to affected sites in pt with underlying HIV/HepC+? Cryoglobulinemic vasculitis POA?    Autoimmune disease POA?    Cutaneous vasculitis POA?    Medication related vasculitis POA?    =>Other Explanation of clinical findings  =>Unable to Determine (no explanation of clinical findings)    The medical record reflects the following clinical findings, treatment, and risk factors:    Risk Factors: UnderlyingSev anemia 2/2 chr LGIB, poss esoph varix, liver cirrhosis 2/2 chr Hep C    Clinical Indicators: wds on LE draining, pt unable to amb,  b/l petechial hemorrhages BLE, 2d 2x2cm lat to RLE, b/l pitting edema. Treatment: xfuse 3upc, lactulose, wd care cs, cs GI, ck CD4 ct, ssi, poss ID cs, for deep puncture biopsies of wounds for dx.     Please clarify and document your clinical opinion in the progress notes and discharge Griselda Dave, [address] [facility], ETFL  [phone]

## 2017-03-29 NOTE — PROGRESS NOTES
G I Progress Note                                  Tuesday                Date/Time:  3/28/2017 11:08 PM      NAME:  Gurpreet Mckeon :  1955    MRN:  [id number]                                                                             Assessment: complex medical problem set with severity index requiring assessment of all medical problems with coordination of other providers recommendation/plans :    Patient Active Problem List    Diagnosis Date Noted    Generalized abdominal pain  ascitic fluid saag >1  Transudate  Mostly lymphocytes   Tb results pending  Ir   impression was mild ascites most of distension bowel distesion ? H&h stable   outpt referral to hepatology for hep c tx  afp pending also    Severe anemia     Multiple open wounds of lower leg     Boils     Chronic airway obstruction, not elsewhere classified [facility]     COPD exacerbation ([facility]     Degenerative Joint Disease     Degenerative Disc Disease     Chronic hepatitis C without mention of hepatic coma (HCC)     Insomnia     Allergic rhinitis, cause unspecified     HTN (hypertension)                              Plan:     1.  Labs pending                                                               Subjective                                                               Yes  No  Yes No     []   [x]  Fever/chills   []   [x]     Abdominal Pain    []   [x]  Cough   []   [x]                               Nausea / Vomiting    []   [x] Sputum  []   [x]                                 Constipation    []   [x]  Chest Pain   []                     [x]    Diarrhea    []        [x]            SOB/HUNTLEY  []   [x]                                Gi bleeding     []          [x]          Headache  []        []                                  []         [x]         Dysuria  []         []                                []         [x]          Joint Pain  []         [] Past Med History and Social history reviewed                                Medications reviewed and discussed with patient   Current Facility-Administered Medications   Medication Dose Route Frequency    insulin lispro (HUMALOG) injection   SubCUTAneous AC&HS    glucose chewable tablet 16 g  4 Tab Oral PRN    dextrose (D50W) injection syrg 12.5-25 g  12.5-25 g IntraVENous PRN    glucagon (GLUCAGEN) injection 1 mg  1 mg IntraMUSCular PRN    ferrous sulfate tablet 325 mg  1 Tab Oral BID WITH MEALS    spironolactone (ALDACTONE) tablet 50 mg  50 mg Oral DAILY    propranolol (INDERAL) tablet 10 mg  10 mg Oral BID    HYDROmorphone (PF) (DILAUDID) injection 1 mg  1 mg IntraVENous ONCE    cefTRIAXone (ROCEPHIN) 1 g in 0.9% sodium chloride (MBP/ADV) 50 mL  1 g IntraVENous Q24H    umeclidinium (INCRUSE ELLIPTA) 62.5 mcg/actuation  1 Puff Inhalation DAILY    triamcinolone acetonide (KENALOG) 0.1 % cream   Topical BID    sodium chloride (NS) flush 5-10 mL  5-10 mL IntraVENous Q8H    sodium chloride (NS) flush 5-10 mL  5-10 mL IntraVENous PRN    glucose chewable tablet 16 g  4 Tab Oral PRN    dextrose (D50W) injection syrg 12.5-25 g  12.5-25 g IntraVENous PRN    glucagon (GLUCAGEN) injection 1 mg  1 mg IntraMUSCular PRN    pantoprazole (PROTONIX) 40 mg in sodium chloride 0.9 % 10 mL injection  40 mg IntraVENous Q12H    senna (SENOKOT) tablet 8.6 mg  1 Tab Oral DAILY    polyethylene glycol (MIRALAX) packet 17 g  17 g Oral DAILY PRN    lactulose (CHRONULAC) solution 10 g  10 g Oral BID    HYDROcodone-acetaminophen (NORCO)  mg tablet 1 Tab  1 Tab Oral Q6H PRN    hydrALAZINE (APRESOLINE) 20 mg/mL injection 10 mg  10 mg IntraVENous Q6H PRN        Objective:                                                                                                                             Vitals:  Visit Vitals    /88 (BP 1 Location: Right arm, BP Patient Position: Sitting)    Pulse 85    Temp 98 °F (36.7 °C)    Resp 18    Ht 5' 7\" (1.702 m)    Wt 72.6 kg (160 lb)    SpO2 99%    Breastfeeding No    BMI 25.06 kg/m2     __________________________________________________________________________  Temp (24hrs), Av.7 °F (37.1 °C), Min:98 °F (36.7 °C), Max:99.6 °F (37.6 °C)  _  ___________________________________________________________________________    O2 Device: Room air  ___________________________________________________________________________    Last 24hr Input/Output:   Date 17 - 17 0617 - 17 0659   Shift  24 Hour Total 1900-0659 24 Hour Total   I  N  T  A  K  E   P.O.   360  360      P. O.   360  360    I.V.  (mL/kg/hr)   50  (0.1)  50      I.V.   0  0      Volume (cefTRIAXone (ROCEPHIN) 1 g in 0.9% sodium chloride (MBP/ADV) 50 mL)   50  50    Shift Total  (mL/kg)   410  (5.6)  410  (5.6)   O  U  T  P  U  T   Urine  (mL/kg/hr)           Urine Occurrence(s)  2 x 3 x  3 x    Stool           Stool Occurrence(s)   1 x  1 x    Shift Total  (mL/kg)        NET   410  410   Weight (kg) 72.6 72.6 72.6 72.6 72.6               PHYSICAL EXAM:  GENERAL alert, cooperative, no distress,        HEENT: atraumatic     conjunctivae            normal   []  Pale    [x]            mucus membranes   Moist    []  Dry    [x]                              CARD:  regular rate and rhythm, S1, S2 normal   RESP:   Clear to auscultation and percussion  Normal [x]     Rales             []      Rhonchi    []     Wheezing    []                     ABD:  normal findings: bowel sounds normal, abnormal findings:  distended, tenderness mild in the lower abdomen and in the upper abdomen                 NEUR:   Alert, oriented,  X 3                   PSYCH:   normal affect                                                       Lab Data Reviewed:                                                                  CBC:    Recent Labs      17   1827  17   0517 03/27/17   1747  03/27/17   0500   WBC   --   3.5*   --   3.9   RBC   --   3.51*   --   3.42*   HGB  8.1*  7.8*  8.0*  7.6*   HCT  26.4*  25.9*  26.5*  25.1*   PLT   --   124*   --   134*   GRANS   --   53   --   58   LYMPH   --   35   --   29   EOS   --   2   --   3                                                                    CHEMISTRY:    Recent Labs      03/28/17   0517  03/27/17   0500  03/26/17   1625   GLU  94  108*  166*   NA  139  141  142   K  3.6  3.3*  3.3*   CL  112*  113*  113*   CO2  17*  18*  19*   BUN  7  8  8   CREA  0.58  0.67  0.77   CA  7.6*  7.4*  7.2*   AGAP  10  10  10   BUCR  12  12  10*   ALB  1.8*   --    --                                                             LIVER ENZYMES:                               Recent Labs      03/28/17   0517   ALB  1.8*                                                           Coagulation Profile                                               No results for input(s): INR in the last 72 hours. No lab exists for component: PT, PTT, INREXT                                                    INFLAMMATION STUDIES:                                                  Lab Results   Component Value Date/Time    Sed rate (ESR) 57 11/28/2012 05:45 PM    C-Reactive protein <0.29 03/25/2017 06:59 PM    C-Reactive protein <0.29 03/31/2015 01:40 AM       See electronic medical records for all procedures/Xrays and details which were not copied into this note but were reviewed prior to creation of Plan.     Risk of deterioration:   []       Low     [x]      Moderate     []     High                                               Time spent with patient:  []     15 mins                       [x]         25 mins                                                []      60 minutes                             []      Critical Care Provided                                       []      >50% of visit spent in                                          counseling and coordination of  care        Communication with   [x]        Patient                     []         Family                                           []   Care Manager         [x]        Nursing                   []   Specialist /PCP                                                       ___________________________________________________    Physician: Max Murrieta MD

## 2017-03-30 ENCOUNTER — HOME HEALTH ADMISSION (OUTPATIENT)
Dept: HOME HEALTH SERVICES | Facility: HOME HEALTH | Age: 62
End: 2017-03-30
Payer: MEDICAID

## 2017-03-30 VITALS
DIASTOLIC BLOOD PRESSURE: 64 MMHG | HEIGHT: 67 IN | SYSTOLIC BLOOD PRESSURE: 129 MMHG | RESPIRATION RATE: 18 BRPM | WEIGHT: 153.66 LBS | TEMPERATURE: 98.2 F | BODY MASS INDEX: 24.12 KG/M2 | HEART RATE: 79 BPM | OXYGEN SATURATION: 99 %

## 2017-03-30 LAB
A2 MACROGLOB SERPL-MCNC: 232 MG/DL (ref 110–276)
ALBUMIN SERPL BCP-MCNC: 1.7 G/DL (ref 3.5–5)
ALBUMIN/GLOB SERPL: 0.3 {RATIO} (ref 1.1–2.2)
ALP SERPL-CCNC: 118 U/L (ref 45–117)
ALT (SGPT) P5P, 001547: 36 IU/L (ref 0–40)
ALT SERPL-CCNC: 39 U/L (ref 12–78)
ANION GAP BLD CALC-SCNC: 8 MMOL/L (ref 5–15)
APO A-I SERPL-MCNC: 61 MG/DL (ref 116–209)
AST SERPL W P-5'-P-CCNC: 78 U/L (ref 15–37)
BACTERIA SPEC CULT: ABNORMAL
BACTERIA SPEC CULT: NORMAL
BACTERIA SPEC CULT: NORMAL
BASOPHILS # BLD AUTO: 0 K/UL (ref 0–0.1)
BASOPHILS # BLD: 1 % (ref 0–1)
BILIRUB SERPL-MCNC: 0.2 MG/DL (ref 0.2–1)
BILIRUB SERPL-MCNC: 0.4 MG/DL (ref 0–1.2)
BUN SERPL-MCNC: 9 MG/DL (ref 6–20)
BUN/CREAT SERPL: 13 (ref 12–20)
CALCIUM SERPL-MCNC: 7.5 MG/DL (ref 8.5–10.1)
CHLORIDE SERPL-SCNC: 109 MMOL/L (ref 97–108)
CO2 SERPL-SCNC: 22 MMOL/L (ref 21–32)
COMMENT, 550127: ABNORMAL
CREAT SERPL-MCNC: 0.71 MG/DL (ref 0.55–1.02)
DIFFERENTIAL METHOD BLD: ABNORMAL
EOSINOPHIL # BLD: 0.2 K/UL (ref 0–0.4)
EOSINOPHIL NFR BLD: 4 % (ref 0–7)
ERYTHROCYTE [DISTWIDTH] IN BLOOD BY AUTOMATED COUNT: 22.1 % (ref 11.5–14.5)
FIBROSIS SCORE, 550102, HCVF1: 0.81 (ref 0–0.21)
FIBROSIS SCORING:, 550107: ABNORMAL
FIBROSIS STAGE, 550132: ABNORMAL
GGT SERPL-CCNC: 158 IU/L (ref 0–60)
GLOBULIN SER CALC-MCNC: 6.3 G/DL (ref 2–4)
GLUCOSE SERPL-MCNC: 114 MG/DL (ref 65–100)
GRAM STN SPEC: ABNORMAL
GRAM STN SPEC: ABNORMAL
HAPTOGLOB SERPL-MCNC: 36 MG/DL (ref 34–200)
HCT VFR BLD AUTO: 25.7 % (ref 35–47)
HCV GENOTYPE: NORMAL
HCV GENTYP SERPL NAA+PROBE: NORMAL
HCV RNA SERPL NAA+PROBE-ACNC: NORMAL IU/ML
HCV RNA SERPL NAA+PROBE-LOG IU: 6.84 LOG10 IU/ML
HGB BLD-MCNC: 7.8 G/DL (ref 11.5–16)
INTERPRETATION, 550106: ABNORMAL
LIMITATIONS, 550105: ABNORMAL
LYMPHOCYTES # BLD AUTO: 34 % (ref 12–49)
LYMPHOCYTES # BLD: 1.4 K/UL (ref 0.8–3.5)
MAGNESIUM SERPL-MCNC: 1.7 MG/DL (ref 1.6–2.4)
MCH RBC QN AUTO: 22.9 PG (ref 26–34)
MCHC RBC AUTO-ENTMCNC: 30.4 G/DL (ref 30–36.5)
MCV RBC AUTO: 75.4 FL (ref 80–99)
MONOCYTES # BLD: 0.3 K/UL (ref 0–1)
MONOCYTES NFR BLD AUTO: 7 % (ref 5–13)
NECROINFLAMM ACTIVITY SCORING:, 550121: ABNORMAL
NECROINFLAMMAT ACTIVITY GRADE, 550133: ABNORMAL
NECROINFLAMMAT ACTIVITY SCORE, 550103: 0.34 (ref 0–0.17)
NEUTS SEG # BLD: 2.2 K/UL (ref 1.8–8)
NEUTS SEG NFR BLD AUTO: 54 % (ref 32–75)
PLATELET # BLD AUTO: 132 K/UL (ref 150–400)
PLEASE NOTE, 550474: NORMAL
POTASSIUM SERPL-SCNC: 4.1 MMOL/L (ref 3.5–5.1)
PROT SERPL-MCNC: 8 G/DL (ref 6.4–8.2)
RBC # BLD AUTO: 3.41 M/UL (ref 3.8–5.2)
RBC MORPH BLD: ABNORMAL
SERVICE CMNT-IMP: ABNORMAL
SERVICE CMNT-IMP: NORMAL
SERVICE CMNT-IMP: NORMAL
SODIUM SERPL-SCNC: 139 MMOL/L (ref 136–145)
TEST INFORMATION, 550045: NORMAL
WBC # BLD AUTO: 4.1 K/UL (ref 3.6–11)

## 2017-03-30 PROCEDURE — 74011250636 HC RX REV CODE- 250/636: Performed by: INTERNAL MEDICINE

## 2017-03-30 PROCEDURE — 74011000250 HC RX REV CODE- 250: Performed by: HOSPITALIST

## 2017-03-30 PROCEDURE — 85025 COMPLETE CBC W/AUTO DIFF WBC: CPT | Performed by: INTERNAL MEDICINE

## 2017-03-30 PROCEDURE — 74011250636 HC RX REV CODE- 250/636: Performed by: HOSPITALIST

## 2017-03-30 PROCEDURE — 36415 COLL VENOUS BLD VENIPUNCTURE: CPT | Performed by: INTERNAL MEDICINE

## 2017-03-30 PROCEDURE — C9113 INJ PANTOPRAZOLE SODIUM, VIA: HCPCS | Performed by: HOSPITALIST

## 2017-03-30 PROCEDURE — 74011000258 HC RX REV CODE- 258: Performed by: INTERNAL MEDICINE

## 2017-03-30 PROCEDURE — 80053 COMPREHEN METABOLIC PANEL: CPT | Performed by: INTERNAL MEDICINE

## 2017-03-30 PROCEDURE — 83735 ASSAY OF MAGNESIUM: CPT | Performed by: INTERNAL MEDICINE

## 2017-03-30 PROCEDURE — 74011250637 HC RX REV CODE- 250/637: Performed by: HOSPITALIST

## 2017-03-30 PROCEDURE — 74011250637 HC RX REV CODE- 250/637: Performed by: INTERNAL MEDICINE

## 2017-03-30 RX ORDER — CEPHALEXIN 250 MG/1
500 CAPSULE ORAL 3 TIMES DAILY
Status: DISCONTINUED | OUTPATIENT
Start: 2017-03-30 | End: 2017-03-30 | Stop reason: HOSPADM

## 2017-03-30 RX ORDER — SPIRONOLACTONE 25 MG/1
75 TABLET ORAL DAILY
Qty: 90 TAB | Refills: 1 | Status: SHIPPED | OUTPATIENT
Start: 2017-03-30 | End: 2017-05-10 | Stop reason: DRUGHIGH

## 2017-03-30 RX ORDER — SULFAMETHOXAZOLE AND TRIMETHOPRIM 400; 80 MG/1; MG/1
1 TABLET ORAL 2 TIMES DAILY
Qty: 14 TAB | Refills: 0 | Status: SHIPPED | OUTPATIENT
Start: 2017-03-30 | End: 2017-04-06

## 2017-03-30 RX ORDER — LANOLIN ALCOHOL/MO/W.PET/CERES
325 CREAM (GRAM) TOPICAL 2 TIMES DAILY WITH MEALS
Qty: 60 TAB | Refills: 1 | Status: SHIPPED | OUTPATIENT
Start: 2017-03-30

## 2017-03-30 RX ORDER — MOXIFLOXACIN HYDROCHLORIDE 400 MG/1
400 TABLET ORAL DAILY
Qty: 7 TAB | Refills: 0 | Status: SHIPPED | OUTPATIENT
Start: 2017-03-30 | End: 2017-04-06

## 2017-03-30 RX ORDER — OXYCODONE HYDROCHLORIDE 10 MG/1
10 TABLET ORAL
Qty: 30 TAB | Refills: 0 | Status: SHIPPED | OUTPATIENT
Start: 2017-03-30 | End: 2017-10-15

## 2017-03-30 RX ORDER — INSULIN LISPRO 100 [IU]/ML
1 INJECTION, SOLUTION INTRAVENOUS; SUBCUTANEOUS
Qty: 15 ML | Refills: 1 | Status: SHIPPED | OUTPATIENT
Start: 2017-03-30

## 2017-03-30 RX ORDER — PROPRANOLOL HYDROCHLORIDE 10 MG/1
10 TABLET ORAL 2 TIMES DAILY
Qty: 60 TAB | Refills: 1 | Status: SHIPPED | OUTPATIENT
Start: 2017-03-30 | End: 2017-09-05 | Stop reason: SDUPTHER

## 2017-03-30 RX ORDER — SULFAMETHOXAZOLE AND TRIMETHOPRIM 800; 160 MG/1; MG/1
1 TABLET ORAL EVERY 12 HOURS
Status: DISCONTINUED | OUTPATIENT
Start: 2017-03-30 | End: 2017-03-30 | Stop reason: HOSPADM

## 2017-03-30 RX ADMIN — Medication 10 ML: at 06:07

## 2017-03-30 RX ADMIN — OXYCODONE HYDROCHLORIDE 10 MG: 5 TABLET ORAL at 02:53

## 2017-03-30 RX ADMIN — SODIUM CHLORIDE 40 MG: 9 INJECTION INTRAMUSCULAR; INTRAVENOUS; SUBCUTANEOUS at 09:37

## 2017-03-30 RX ADMIN — CEFTRIAXONE 1 G: 1 INJECTION, POWDER, FOR SOLUTION INTRAMUSCULAR; INTRAVENOUS at 09:37

## 2017-03-30 RX ADMIN — SPIRONOLACTONE 75 MG: 25 TABLET, FILM COATED ORAL at 09:38

## 2017-03-30 RX ADMIN — Medication 325 MG: at 09:38

## 2017-03-30 RX ADMIN — SENNOSIDES 8.6 MG: 8.6 TABLET, FILM COATED ORAL at 09:38

## 2017-03-30 RX ADMIN — PROPRANOLOL HYDROCHLORIDE 10 MG: 10 TABLET ORAL at 09:38

## 2017-03-30 RX ADMIN — LACTULOSE 10 G: 10 SOLUTION ORAL at 09:37

## 2017-03-30 RX ADMIN — OXYCODONE HYDROCHLORIDE 10 MG: 5 TABLET ORAL at 07:08

## 2017-03-30 RX ADMIN — UMECLIDINIUM 1 PUFF: 62.5 AEROSOL, POWDER ORAL at 11:25

## 2017-03-30 NOTE — FACE TO FACE
Home Health Care Discharge Planning: [facility]  Face to Face Encounter      NAME: Lilliam Larkin   :  1955   MRN:  [id number]     Primary Diagnosis: Leg Cellulitis and infected wound, SBP, Cirrhosis, HIV, HepC, DM, COPD    Date of Face to Face:  3/30/2017 10:04 AM                                  Face to Face Encounter findings are related to primary reason for home care:   YES    1. I certify that the patient needs intermittent skilled nursing care, physical therapy and/or speech therapy. I will not be following this patient in the Community and Dr. Ted Jernigan MD will be responsible for signing the [address] 2. Initial Orders for Care: Skilled Nursing, Physical Therapy and Occupational Therapy    3. I certify that this patient is homebound because of illness or injury, need the aid of supportive devices such as crutches, canes, wheelchairs, and walkers; the use of special transportation; or the assistance of another person in order to leave their place of residence. There exists a normal inability to leave home and leaving home requires a considerable and taxing effort. 4. I certify that this patient is under my care and that I had a Face-to-Face Encounter that meets the physician Face-to-Face Encounter requirements. Document the physical findings from the Face-to-Face Encounter that support the need for skilled services: Has wound that requires skilled nursing assessment and treatment , Has new diagnosis that requires skilled nursing teaching and intervention , Has new medications that requires skilled nursing teaching and monitoring for understanding and compliance , Needs skilled safety assessment and interventions  and Has new finding of weakness and altered mobility that requires skilled physical/occupational and/or speech therapy services for evaluation and interventions.      Mango Jarvis MD  Discharging Physician  Office: [phone]  Fax:   [phone]

## 2017-03-30 NOTE — PROGRESS NOTES
End of Shift Nursing Note    Bedside shift change report given to Dondra Harada (oncoming nurse) by Jessa Elise (offgoing nurse). Report included the following information SBAR, Kardex, Procedure Summary and Recent Results. Zone Phone:   [phone]    Significant changes during shift:    none   Non-emergent issues for physician to address:   none     Number times ambulated in hallway past shift: none      Number of times OOB to chair past shift: up ad jeremías    POD #: 1     Vital Signs:    Temp: 98.4 °F (36.9 °C)     Pulse (Heart Rate): 83     BP: 140/82     Resp Rate: 16     O2 Sat (%): 99 %    Lines & Drains:     Urinary Catheter? No   Placement Date:    Medical Necessity:   Central Line? No   Placement Date:    Medical Necessity:   PICC Line? No   Placement Date:    Medical Necessity:     NG tube [] in [] removed [x] not applicable   Drains [] in [] removed [x] not applicable     Skin Integrity:      Wounds: yes   Dressings Present: yes    Wound Concerns: no      GI:    Current diet:  DIET CARDIAC Regular; No Conc. Sweets    Nausea: NO  Vomiting: NO  Bowel Sounds: YES  Flatus: YES  Last Bowel Movement: today   Appearance:     Respiratory:  Supplemental O2: No      Device:    via  Liters/min     Incentive Spirometer: YES  Volume:   Coughing and Deep Breathing: YES  Oral Care: NO  Understanding (patient/family education): YES   Getting out of bed: YES  Head of bed elevation: YES    Patient Safety:    Falls Score: 3  Mobility Score: 1  Bed Alarm On? No  Sitter? No      Opportunity for questions and clarification was given to oncoming nurse. Patient bed is in low position, side rails are up x 2, door & observation blinds open as needed, call bell within reach and patient not in distress.     Mamta Rawls RN

## 2017-03-30 NOTE — PROGRESS NOTES
ID Progress Note    Subjective:   Daily Progress Note: 3/30/2017 1:49 PM    DX:    1. HIV infection. Longstanding with no prior OIs. Elevated Viral Load(300,000) discordant with stable CD4 cts of over 300. No HAART recently or for any length of time in the past.      Would not begin HAART until genotype is back. Can afford to wait and see her as an outpatient to do this, which is also a test of her compliance.      2. HCV infection. May well be past the point of treatment with newer agents. Already cirrhotic with ascites. SBP being ruled out.     3. Wound infection, lower legs. Probably secondary infection with GAS and MRSA. Review of Systems: Afebrile. Leg pain same. Abdominal pain less. - fever, sweats, or chills. -weight loss. +fatigue/malasie. +Rash. -Swollen glands. -Oral lesions. -Photophobia. -Jaundice. - SOB. - cough. +abdominal pain or tenderness. -Nausea or vomiting. - Diarrhea. -No joint inflammation or swelling. No headache or AMS. + bilateral leg pain and lesions. ROS otherwise negative. CURRENT ANTIMICROBIALS: ceftriaxone. Objective:     Visit Vitals    /64 (BP 1 Location: Right arm)    Pulse 79    Temp 98.2 °F (36.8 °C)    Resp 18    Ht 5' 7\" (1.702 m)    Wt 153 lb 10.6 oz (69.7 kg)    SpO2 99%    Breastfeeding No    BMI 24.07 kg/m2      O2 Device: Room air    Temp (24hrs), Av.3 °F (36.8 °C), Min:97.8 °F (36.6 °C), Max:98.6 °F (37 °C)  General: Afebrile and not toxic. No exanthem or enanthem. Diffuse shotty peripheral adenopathy. Alert and oriented x3. All IV sites are clean and dry. HEENT: EOMI. Anicteric. Oral mucosa normal. Conjunctivae normal. Neck supple. No thrush. Edentulous. Chest: Lungs clear to A&P. Regular rhythm without murmurs. No peripheral embolic phenomena. Abdomen: Soft without organomegaly, +tenderness. + distended and taut with ascites. Musculoskeletal: No joint inflammation or effusions. Trace edema.  Nail beds normal.  Neurologic: Nonfocal exam.  Wounds: multiple wounds on both lower legs, but not soles or dorsum of feet. Some significantly scarred. Others nodular and ulcerated. All are quite tender. No pus at sites. Data Review: Ascitic fluid gram stain and culture negative. WBC = 4100. Blood cults negative. Skin biopsy cultures + for MRSA and GAS. Recent Results (from the past 24 hour(s))   GLUCOSE, POC    Collection Time: 03/29/17  4:49 PM   Result Value Ref Range    Glucose (POC) 115 (H) 65 - 100 mg/dL    Performed by Crystal Mendez*    GLUCOSE, POC    Collection Time: 03/29/17  9:58 PM   Result Value Ref Range    Glucose (POC) 181 (H) 65 - 100 mg/dL    Performed by Shelly Lock    CBC WITH AUTOMATED DIFF    Collection Time: 03/30/17  6:20 AM   Result Value Ref Range    WBC 4.1 3.6 - 11.0 K/uL    RBC 3.41 (L) 3.80 - 5.20 M/uL    HGB 7.8 (L) 11.5 - 16.0 g/dL    HCT 25.7 (L) 35.0 - 47.0 %    MCV 75.4 (L) 80.0 - 99.0 FL    MCH 22.9 (L) 26.0 - 34.0 PG    MCHC 30.4 30.0 - 36.5 g/dL    RDW 22.1 (H) 11.5 - 14.5 %    PLATELET 104 (L) 912 - 400 K/uL    NEUTROPHILS 54 32 - 75 %    LYMPHOCYTES 34 12 - 49 %    MONOCYTES 7 5 - 13 %    EOSINOPHILS 4 0 - 7 %    BASOPHILS 1 0 - 1 %    ABS. NEUTROPHILS 2.2 1.8 - 8.0 K/UL    ABS. LYMPHOCYTES 1.4 0.8 - 3.5 K/UL    ABS. MONOCYTES 0.3 0.0 - 1.0 K/UL    ABS. EOSINOPHILS 0.2 0.0 - 0.4 K/UL    ABS.  BASOPHILS 0.0 0.0 - 0.1 K/UL    DF SMEAR SCANNED      RBC COMMENTS ANISOCYTOSIS  1+        RBC COMMENTS HYPOCHROMIA  1+        RBC COMMENTS MICROCYTOSIS  1+       MAGNESIUM    Collection Time: 03/30/17  6:20 AM   Result Value Ref Range    Magnesium 1.7 1.6 - 2.4 mg/dL   METABOLIC PANEL, COMPREHENSIVE    Collection Time: 03/30/17  6:20 AM   Result Value Ref Range    Sodium 139 136 - 145 mmol/L    Potassium 4.1 3.5 - 5.1 mmol/L    Chloride 109 (H) 97 - 108 mmol/L    CO2 22 21 - 32 mmol/L    Anion gap 8 5 - 15 mmol/L    Glucose 114 (H) 65 - 100 mg/dL    BUN 9 6 - 20 MG/DL    Creatinine 0.71 0.55 - 1.02 MG/DL    BUN/Creatinine ratio 13 12 - 20      GFR est AA >60 >60 ml/min/1.73m2    GFR est non-AA >60 >60 ml/min/1.73m2    Calcium 7.5 (L) 8.5 - 10.1 MG/DL    Bilirubin, total 0.2 0.2 - 1.0 MG/DL    ALT (SGPT) 39 12 - 78 U/L    AST (SGOT) 78 (H) 15 - 37 U/L    Alk. phosphatase 118 (H) 45 - 117 U/L    Protein, total 8.0 6.4 - 8.2 g/dL    Albumin 1.7 (L) 3.5 - 5.0 g/dL    Globulin 6.3 (H) 2.0 - 4.0 g/dL    A-G Ratio 0.3 (L) 1.1 - 2.2           Assessment/Plan:    Awaiting genotype before considering HAART. CD4 count reasonable in spite of very high viral load. Skin biopsy results non-diagnostic. Just inflammation. However, cultures are + for group A Streptococcus and MRSA. At the least may be secondarily infected. Special stains negative. No evidence for SBP. Will stop ceftriaxone and place her on one- two weeks of oral antibiotics, which will cover both GP pathogens. Would not use linezolid in view of anemia. So will need tqo drugs, ie TMP/SMZ and cephalexin.

## 2017-03-30 NOTE — PROGRESS NOTES
Occupational Therapy    Attempted OT treatment. Patient reports she has been up and walking today. Educated on role of OT, she reports she is going home today and her daughter will assist her. Offer to complete bathing and dressing in prep for discharge however patient preferring to rest at this time and wants to complete when her daughter arrives. Denies needs for dc.      Thank you,    Robel BOLIVAR/LIS

## 2017-03-30 NOTE — CONSULTS
Rheumatology    Pt being discharged. Cryo and Bx still pending  RF, SOLOMON, Sjogren Ab, ANCA negative. Low Complements could be from reduced liver production, or by consumption, however, C1q binding is not impressive for immune complex formation / activation.

## 2017-03-30 NOTE — PROGRESS NOTES
Pt is being discharged today home with [facility] arranged with [facility], other agencies were unable to staff and family choose [facility]. Attempted to make follow up appointments with GI, PCP, ID, and rheumatology. VM left and some appointments made. Pt's daughter aware that she will need to follow up to confirm and arrange appointments. She voiced no concerns. She is looking into changing PCP and the list of [facility] was given to her for review. Care Management Interventions  PCP Verified by CM: Yes (list of [facility] given to pt for dtr to review. looking into a new PCP)  Mode of Transport at Discharge: Other (see comment) ([facility] can transport)  Transition of Care Consult (CM Consult): Discharge Planning  Discharge Durable Medical Equipment: No (2 canes and a walker)  Current Support Network:  Other, Own Home (lives with her daughter in a 2 story home with alot of steps per pt )  Confirm Follow Up Transport: Family  Discharge Location  Discharge Placement: [address], [address]

## 2017-03-30 NOTE — PROGRESS NOTES
G I Progress Note                              Wednesday                     Date/Time:  3/29/2017 11:59 PM      NAME:  Arcenio Marrero     :   1955   MRN:  [id number]    Assessment: complex medical problem set with severity index requiring assessment of all medical problems with coordination of other providers recommendation/plans:    Patient Active Problem List    Diagnosis Date Noted    Generalized abdominal pain  better no n/v or evidence of significant gi pathology  On  rx for possible culture neg sbp     Severe anemia Stable     Multiple open wounds of lower leg     Boils     [facility] obstruction, not elsewhere classified [facility]     COPD exacerbation ([facility]     Degenerative Joint Disease     Degenerative Disc Disease     Chronic hepatitis C without mention of hepatic coma (HCC) afp slight elevation                               Plan:     1. ? egd Friday if still in the hospital and ok with everyone                Subjective         Yes  No  Yes No     []   [x]  Fever/chills   [x]   [x]    Abd Pain     []   [x]  Cough   []   [x]                             Nausea/Vomiting    []   [x] Sputum  []   [x]                                Diarrhea    []        [x]            SOB/HUNTLEY  []   [x]                               Constipation    []   [x]     Joint Pain  []        [x]               Rash    []          [x]           Myalgia  []        [x]                             Edema    []         []           []         []                            Tolerating Diet                                       Past Med History and Social history reviewed.  .                                                 Medications reviewed:   Current Facility-Administered Medications   Medication Dose Route Frequency    [START ON 3/30/2017] spironolactone (ALDACTONE) tablet 75 mg  75 mg Oral DAILY    oxyCODONE IR (ROXICODONE) tablet 10 mg  10 mg Oral Q4H PRN    insulin lispro (HUMALOG) injection   SubCUTAneous AC&HS    glucose chewable tablet 16 g  4 Tab Oral PRN    dextrose (D50W) injection syrg 12.5-25 g  12.5-25 g IntraVENous PRN    glucagon (GLUCAGEN) injection 1 mg  1 mg IntraMUSCular PRN    ferrous sulfate tablet 325 mg  1 Tab Oral BID WITH MEALS    propranolol (INDERAL) tablet 10 mg  10 mg Oral BID    cefTRIAXone (ROCEPHIN) 1 g in 0.9% sodium chloride (MBP/ADV) 50 mL  1 g IntraVENous Q24H    umeclidinium (INCRUSE ELLIPTA) 62.5 mcg/actuation  1 Puff Inhalation DAILY    triamcinolone acetonide (KENALOG) 0.1 % cream   Topical BID    sodium chloride (NS) flush 5-10 mL  5-10 mL IntraVENous Q8H    sodium chloride (NS) flush 5-10 mL  5-10 mL IntraVENous PRN    glucose chewable tablet 16 g  4 Tab Oral PRN    dextrose (D50W) injection syrg 12.5-25 g  12.5-25 g IntraVENous PRN    glucagon (GLUCAGEN) injection 1 mg  1 mg IntraMUSCular PRN    pantoprazole (PROTONIX) 40 mg in sodium chloride 0.9 % 10 mL injection  40 mg IntraVENous Q12H    senna (SENOKOT) tablet 8.6 mg  1 Tab Oral DAILY    polyethylene glycol (MIRALAX) packet 17 g  17 g Oral DAILY PRN    lactulose (CHRONULAC) solution 10 g  10 g Oral BID    hydrALAZINE (APRESOLINE) 20 mg/mL injection 10 mg  10 mg IntraVENous Q6H PRN          Objective:                                                                                                                             Vitals:  Visit Vitals    /82 (BP 1 Location: Right arm, BP Patient Position: At rest)    Pulse 83    Temp 98.4 °F (36.9 °C)    Resp 16    Ht 5' 7\" (1.702 m)    Wt 69.7 kg (153 lb 10.6 oz)    SpO2 99%    Breastfeeding No    BMI 24.07 kg/m2     __________________________________________________________________________  Temp (24hrs), Av.3 °F (36.8 °C), Min:97.8 °F (36.6 °C), Max:98.9 °F (37.2 °C)  _  ___________________________________________________________________________    O2 Device: Room air  ___________________________________________________________________________    Last 24hr Input/Output:   Date 03/28/17 1900 - 03/29/17 0659 03/29/17 0700 - 03/30/17 0659   Shift 4345-2678 24 Hour Total 3342-4786 4118-6409 24 Hour Total   I  N  T  A  K  E   P.O.  360         P. O.  360       I.V.  (mL/kg/hr)  50         I.V.  0         Volume (cefTRIAXone (ROCEPHIN) 1 g in 0.9% sodium chloride (MBP/ADV) 50 mL)  50       Shift Total  (mL/kg)  410  (5.9)      O  U  T  P  U  T   Urine  (mL/kg/hr)   1150  (1.4)  1150      Urine Voided   1150  1150      Urine Occurrence(s)  3 x       Stool           Stool Occurrence(s)  1 x       Shift Total  (mL/kg)   1150  (16.5)  1150  (16.5)   NET  410 -1150  -1150   Weight (kg) 69.7 69.7 69.7 69.7 69.7                                                             PHYSICAL EXAM:   GENERAL    cooperative, No obvious distress         HEENT:  normocephalic no evidence of trauma             Conjunctivae        Normal []              Pallor [x]       corneas clear and pupils equally reactive , symmetric    mucus membranes   Normal []                 Dry [x]      No nasal discharge,  or  sinus tenderness   NECK:  Supple, Cervical, supraclavicular,  normal.                CARD:   No murmurs or gallops noted           S1 normal [x]                                                                           S2 normal [x]                                                                    RESP:   Clear to auscultation and percussion bilaterally         Chest Wall   nontender good movement with respiration              ABD:  abnormal findings:  distended, tenderness mild in the lower abdomen and in the upper abdomen                 NEUR:   Alert, oriented, thought content appropriate               PSYCH:  Does not appear depressed or anxious             []  Telemetry  Reviewed   [] NSR      []   Afib       [] Paced   []    PAC/PVCs                                                 Lab Data Reviewed:                                                                  CBC:    Recent Labs      03/29/17 0357 03/28/17   1827  03/28/17 0517 03/27/17   0500   WBC  3.5*   --   3.5*   --   3.9   RBC  3.43*   --   3.51*   --   3.42*   HGB  7.9*  8.1*  7.8*   < >  7.6*   HCT  25.9*  26.4*  25.9*   < >  25.1*   MCV  75.5*   --   73.8*   --   73.4*   PLT  134*   --   124*   --   134*   GRANS  50   --   53   --   58   LYMPH  35   --   35   --   29   EOS  4   --   2   --   3    < > = values in this interval not displayed. CHEMISTRY:    Recent Labs      03/29/17 0357 03/28/17 0517 03/27/17   1747  03/27/17   0500   GLU  119*  94   --   108*   NA  140  139   --   141   K  3.8  3.6   --   3.3*   CL  111*  112*   --   113*   CO2  18*  17*   --   18*   BUN  8  7   --   8   CREA  0.70  0.58   --   0.67   CA  7.7*  7.6*   --   7.4*   AGAP  11  10   --   10   BUCR  11*  12   --   12   AP  117   --    --    --    TP  7.9   --   7.7   --    ALB  1.7*  1.8*   --    --    GLOB  6.2*   --    --    --    AGRAT  0.3*   --    --    --                                                             LIVER ENZYMES:                               Recent Labs      03/29/17 0357   TP  7.9   ALB  1.7*   AP  117   SGOT  61*                                                           Coagulation Profile                                               No results for input(s): INR in the last 72 hours.     No lab exists for component: PT, PTT, INREXT                                                    INFLAMMATION STUDIES:                                                  Lab Results   Component Value Date/Time    Sed rate (ESR) 57 11/28/2012 05:45 PM    C-Reactive protein <0.29 03/25/2017 06:59 PM    C-Reactive protein <0.29 03/31/2015 01:40 AM         See electronic medical records for all procedures/Xrays and details  which were not copied into this note but were reviewed prior to creation of this note     Risk of deterioration:   []       Low     [x]      Moderate     []     High                                               Time spent with patient:  []     15 mins                       [x]         25 mins                                                []      60 minutes                             []      Critical Care Provided                                       []      >50% of visit spent in                                          counseling                             and coordination of  care        Communication with   [x]        Patient                     []         Family                                           []   Care Manager         [x]        Nursing                   []   Specialist /PCP                                                       ___________________________________________________    Physician: Nick Lantigua MD

## 2017-03-30 NOTE — PROGRESS NOTES
Bedside shift change report given to Vic Jarquin (oncoming nurse) by Lance Antonio RN (offgoing nurse). Report included the following information SBAR, Kardex, MAR and Recent Results.

## 2017-03-30 NOTE — DISCHARGE SUMMARY
Hospitalist Discharge Summary     Patient ID:  Darnell Riggins  [id number]  57 y.o.  1955    PCP on record: Tabitha Monzon MD    Admit date: 3/24/2017  Discharge date and time: 3/30/2017      DISCHARGE DIAGNOSIS:    Leg Cellulitis and infected wound, SBP, Cirrhosis, HIV, HepC, DM, COPD      CONSULTATIONS:  IP CONSULT TO GASTROENTEROLOGY  IP CONSULT TO INFECTIOUS DISEASES  IP CONSULT TO RHEUMATOLOGY  IP CONSULT TO INTERVENTIONAL RADIOLOGY  IP CONSULT TO GENERAL SURGERY    Excerpted HPI from H&P of Sara Rosas MD:  The patient is a 68-year-old   [facility] American female with past medical history of hepatitis C, not   treated, history of HIV, not on antiretroviral therapy. She also has a   history of COPD, hypertension, and diabetes. She presented to the   emergency department complaining of generalized abdominal pain that   has been ongoing for the past 1 week. She reports that has been   constipated for the past 4 days and the last 2 days, she developed   diarrhea of stools, which looked dark. She denies any passage of   bright red blood per rectum. She also tells me that she noticed a   wound on her right leg 2 days ago and yesterday there were red spots   developing on her feet. She tells me that her legs are also swollen and   she has not been able to ambulate. She also reports that her abdomen   is distended with urinary frequency for the past 2 days. So based on all   of these, she decided to come to the emergency department for   evaluation. Of note, she denies any associated history of nausea,   vomiting, no cough or fever.      Today, here in the emergency department, hematology was done and   she was found to have hemoglobin of 4.3, with MCV of 59.2. Chemistry   was basically unremarkable with lactic acid of 1.0. She had a CT scan   of the abdomen and pelvis done, which showed somewhat nodular   liver, likely related to cirrhosis.  The spleen is enlarged with mild to moderate amount of ascites. She is scheduled to be transfused with at   least 3 units of packed red blood cells and the hospitalist service were   subsequently consulted for hospital admission for evaluation. ______________________________________________________________________  DISCHARGE SUMMARY/HOSPITAL COURSE:  for full details see H&P, daily progress notes, labs, consult notes. Liver Cirrhosis/Chronic Hep C: c/w  PPI, BB and increase spironolactone, Monitor. Fluid cytology is benign, although AFP is slightly high, eventually will need liver biopsy. , patient has anasarca related to liver disease and hypoalbuminemia that is the reason for her pitting edema. Acute on chronic blood loss anemia: no louis bleeding at this time, c/w iron supplement,. S/p 3 units PRBC. SBP: c/w CTX/Vancomycin, Fluid no organism seen but high cellularity. Cytology negative, abdomen tender, will Dc/ on Avelox, even though fluid culture is negative, may be deceiving due to patient been on ABX already  Abnormal U/A: UTI ruled out, culture shows no growth. Hypokalemia monitor. Hx of HIV and AIDs  Dr. Demetria Closs in the case, plan to follow as outpatient.  -not on meds, [place] [facility] [place] [facility] at [facility], however lost to follow up and had not been on antiretroviral meds for years.  -she is at risk for opportunistic infections  CD4 counts 363 with high viral load. Hepatitis/C panel, RPR ordered  -cxr neg   B/l LE rash with painful ulcer likely related to erythema nodosum vs cryoglobulinemia related to chronic Hep C  -Erythema had decreased while on ABX, wound still present  -RF/CRP neg, SED 85  -f/u with [facility], compliments, as per Rheumatology no Erythema Nodosum, but may be Cryoglobulinemia/Molluscum? ? Biopsy done, will f/u results.   Staph isolated from wound will D/c on Bactrim and Avelox  - consult rheum for further assistant  T2DM A1C 5.4 c/w SSI  COPD Stable, cont' home inhalers  Code status: Full  Prophylaxis: scds  Recommended Disposition: Home Health services    D/c Home with Home Health services and F/U with PCP, GI, ID and Rheumatology as outpatient  _______________________________________________________________________  Patient seen and examined by me on discharge day. Pertinent Findings:  Gen:    Not in distress  Chest: Coarse BS  CVS:   Regular rhythm.  + edema  Abd:  Soft, + distended, not tender  Neuro:  Alert, GCS 15    Home Health Care Discharge Planning: [facility] [place]  Face to Face Encounter      NAME: Yaya Weaver   :  1955   MRN:  [id number]     Primary Diagnosis: Leg Cellulitis and infected wound, SBP, Cirrhosis, HIV, HepC, DM, COPD    Date of Face to Face:  3/30/2017 10:04 AM                                  Face to Face Encounter findings are related to primary reason for home care:   YES    1. I certify that the patient needs intermittent skilled nursing care, physical therapy and/or speech therapy. I will not be following this patient in the Community and Dr. Pam Brewster MD will be responsible for signing the [address] 2. Initial Orders for Care: Skilled Nursing, Physical Therapy and Occupational Therapy    3. I certify that this patient is homebound because of illness or injury, need the aid of supportive devices such as crutches, canes, wheelchairs, and walkers; the use of special transportation; or the assistance of another person in order to leave their place of residence. There exists a normal inability to leave home and leaving home requires a considerable and taxing effort. 4. I certify that this patient is under my care and that I had a Face-to-Face Encounter that meets the physician Face-to-Face Encounter requirements. Document the physical findings from the Face-to-Face Encounter that support the need for skilled services:  Has wound that requires skilled nursing assessment and treatment , Has new diagnosis that requires skilled nursing teaching and intervention , Has new medications that requires skilled nursing teaching and monitoring for understanding and compliance , Needs skilled safety assessment and interventions  and Has new finding of weakness and altered mobility that requires skilled physical/occupational and/or speech therapy services for evaluation and interventions. Erika Saini MD  Discharging Physician  Office: [phone]  Fax:   [phone]      _______________________________________________________________________  DISCHARGE MEDICATIONS:   Current Discharge Medication List      START taking these medications    Details   ferrous sulfate 325 mg (65 mg iron) tablet Take 1 Tab by mouth two (2) times daily (with meals). Qty: 60 Tab, Refills: 1      lactulose (CHRONULAC) 10 gram/15 mL solution Take 15 mL by mouth two (2) times a day for 30 days. Qty: 900 mL, Refills: 1      propranolol (INDERAL) 10 mg tablet Take 1 Tab by mouth two (2) times a day. Qty: 60 Tab, Refills: 1      spironolactone (ALDACTONE) 25 mg tablet Take 3 Tabs by mouth daily. Qty: 90 Tab, Refills: 1      trimethoprim-sulfamethoxazole (BACTRIM)  mg per tablet Take 1 Tab by mouth two (2) times a day for 7 days. Qty: 14 Tab, Refills: 0      moxifloxacin (AVELOX) 400 mg tablet Take 1 Tab by mouth daily for 7 days. Qty: 7 Tab, Refills: 0         CONTINUE these medications which have CHANGED    Details   oxyCODONE IR (ROXICODONE) 10 mg tab immediate release tablet Take 1 Tab by mouth every four (4) hours as needed. Max Daily Amount: 60 mg.  Qty: 30 Tab, Refills: 0      insulin lispro (HUMALOG KWIKPEN) 100 unit/mL kwikpen 1 Units by SubCUTAneous route Before breakfast, lunch, dinner and at bedtime.  150-200=2 nits  201-250=4 units  251-300=6 units  301-350=8 units  351-400=10 units  More than 400 call MD  Qty: 15 mL, Refills: 1         CONTINUE these medications which have NOT CHANGED    Details   Blood-Glucose Meter monitoring kit Monitor your blood sugar 3 times daily. Qty: 1 Kit, Refills: 0      !! TRUEPLUS LANCETS 33 gauge misc       triamcinolone (ARISTOCORT) 0.5 % topical cream Apply  to affected area two (2) times a day. use thin layer  Qty: 45 g, Refills: 5    Associated Diagnoses: Eczema      !! glucose blood VI test strips (TRUETEST TEST STRIPS) strip Patient test once daily  Qty: 50 Strip, Refills: 11      SPIRIVA WITH HANDIHALER 18 mcg inhalation capsule inhale the contents of one capsule in the handihaler once daily  Qty: 30 Cap, Refills: 2      !! Lancets misc As directed  Qty: 1 Each, Refills: 11      !! glucose blood VI test strips (BLOOD GLUCOSE TEST) strip As directed  Qty: 100 Strip, Refills: 11      Insulin Needles, Disposable, (BD INSULIN PEN NEEDLE UF MINI) 31 x 3/16 \" ndle Use as directed with insulin pen  Qty: 100 Each, Refills: 12    Associated Diagnoses: Type II or unspecified type diabetes mellitus without mention of complication, uncontrolled      albuterol (PROVENTIL HFA, VENTOLIN HFA, PROAIR HFA) 90 mcg/actuation inhaler Take 2 puffs by inhalation every four (4) hours as needed for Wheezing. Qty: 1 Inhaler, Refills: 12    Associated Diagnoses: Bronchitis, acute; COPD exacerbation (HCC)      Nebulizer & Compressor machine 1 each by Does Not Apply route four (4) times daily as needed. Qty: 1 each, Refills: 0      fluticasone-salmeterol (ADVAIR) 250-50 mcg/dose diskus inhaler Take 1 Puff by inhalation two (2) times a day. Qty: 1 Inhaler, Refills: 12    Associated Diagnoses: COPD exacerbation (HCC)      omega-3 fatty acids-vitamin e (FISH OIL) 1,000 mg Cap Take 1 Cap by mouth every other day. Associated Diagnoses: Type II or unspecified type diabetes mellitus without mention of complication, uncontrolled      ascorbic acid (VITAMIN C) 1,000 mg tablet Take  by mouth every other day.     Associated Diagnoses: Type II or unspecified type diabetes mellitus without mention of complication, uncontrolled !! - Potential duplicate medications found. Please discuss with provider. STOP taking these medications       naproxen (NAPROSYN) 500 mg tablet Comments:   Reason for Stopping:         amLODIPine (NORVASC) 10 mg tablet Comments:   Reason for Stopping:         insulin glargine (LANTUS) 100 unit/mL injection Comments:   Reason for Stopping:         HYDROcodone-acetaminophen (NORCO) 7.5-325 mg per tablet Comments:   Reason for Stopping:         ibuprofen (MOTRIN) 800 mg tablet Comments:   Reason for Stopping:               My Recommended Diet, Activity, Wound Care, and follow-up labs are listed in the patient's Discharge Insturctions which I have personally completed and reviewed.     _______________________________________________________________________  DISPOSITION:    Home with Family:    Home with HH/PT/OT/RN: y   SNF/LTC:    SATINDER:    OTHER:        Condition at Discharge:  Stable  _______________________________________________________________________  Follow up with:   PCP : Ladena Najjar., MD  Follow-up Information     Follow up With Details Comments Contact Info    Ladena Najjar., MD   [facility]  [phone]          F/U PCP  F/U GI  F/U ID  F/U Rheumatology      Total time in minutes spent coordinating this discharge (includes going over instructions, follow-up, prescriptions, and preparing report for sign off to her PCP) :  35 minutes    Signed:  Scott Espinosa MD

## 2017-03-30 NOTE — DISCHARGE INSTRUCTIONS
HOSPITALIST DISCHARGE INSTRUCTIONS  NAME: Nima Gan   :  1955   MRN:  [id number]     Date/Time:  3/30/2017 10:05 AM    ADMIT DATE: 3/24/2017     DISCHARGE DATE: 3/30/2017     DIAGNOSIS:  Leg Cellulitis and infected wound, SBP, Cirrhosis, HIV, HepC, DM, COPD      MEDICATIONS                As per medication reconciliation    · It is important that you take the medication exactly as they are prescribed. · Keep your medication in the bottles provided by the pharmacist and keep a list of the medication names, dosages, and times to be taken in your wallet. · Do not take other medications without consulting your doctor. Pain Management: per above medications    What to do at Home    Recommended diet:  Cardiac Diet and Diabetic Diet    Recommended activity: Activity as tolerated and No driving while on analgesics    If you experience any of the following symptoms then please call your primary care physician or return to the emergency room if you cannot get hold of your doctor:  Fever, chills, nausea, vomiting, diarrhea, change in mentation, falling, bleeding, shortness of breath. Follow Up:   PCP you are to call and set up an appointment to see them in 2 week. F/U GI  F/U ID  F/U Rheumatology      Information obtained by :  I understand that if any problems occur once I am at home I am to contact my physician. I understand and acknowledge receipt of the instructions indicated above.                                                                                                                                            Physician's or R.N.'s Signature                                                                  Date/Time                                                                                                                                              Patient or Representative Signature                                                          Date/Time

## 2017-03-30 NOTE — PROGRESS NOTES
Notified Dr. Karel Dakins of patients results of skin biopsy, per MD discharge antibiotic will treat and follow up with Dr. Kady Leiva after discharge. Went over discharge paperwork with patient and daughter, both verbalized understanding. Gave hard prescription copies and removed IV. Volunteer request placed.

## 2017-03-31 ENCOUNTER — PATIENT OUTREACH (OUTPATIENT)
Dept: INTERNAL MEDICINE CLINIC | Age: 62
End: 2017-03-31

## 2017-03-31 LAB
ANNOTATION COMMENT IMP: NORMAL
BACTERIA SPEC CULT: NORMAL
GRAM STN SPEC: NORMAL
GRAM STN SPEC: NORMAL
HLA-B*57:01 SPEC QL: NEGATIVE
M TB IFN-G CD4+ BCKGRND COR BLD-ACNC: 0 IU/ML
M TB IFN-G CD4+ T-CELLS BLD-ACNC: 0.1 IU/ML
M TB TUBERC IFN-G BLD QL: NEGATIVE
M TB TUBERC IGNF/MITOGEN IGNF CONTROL: 3.23 IU/ML
QUANTIFERON NIL VALUE: 0.1 IU/ML
SERVICE CMNT-IMP: NORMAL

## 2017-04-02 ENCOUNTER — HOME CARE VISIT (OUTPATIENT)
Dept: SCHEDULING | Facility: HOME HEALTH | Age: 62
End: 2017-04-02
Payer: MEDICAID

## 2017-04-02 LAB
GENE ANALYSIS NARR RPT DOC: NORMAL
HIV SUSC PNL ISLT GENOTYP: NORMAL
HIV SUSC PNL ISLT GENOTYP: NORMAL
TEST PERFORMANCE INFO SPEC: NORMAL

## 2017-04-02 PROCEDURE — G0299 HHS/HOSPICE OF RN EA 15 MIN: HCPCS

## 2017-04-02 PROCEDURE — 400013 HH SOC

## 2017-04-03 ENCOUNTER — PATIENT OUTREACH (OUTPATIENT)
Dept: INTERNAL MEDICINE CLINIC | Age: 62
End: 2017-04-03

## 2017-04-03 ENCOUNTER — HOME CARE VISIT (OUTPATIENT)
Dept: HOME HEALTH SERVICES | Facility: HOME HEALTH | Age: 62
End: 2017-04-03
Payer: MEDICAID

## 2017-04-03 VITALS
OXYGEN SATURATION: 96 % | SYSTOLIC BLOOD PRESSURE: 128 MMHG | HEART RATE: 78 BPM | BODY MASS INDEX: 25.11 KG/M2 | WEIGHT: 160 LBS | TEMPERATURE: 97.3 F | HEIGHT: 67 IN | DIASTOLIC BLOOD PRESSURE: 74 MMHG | RESPIRATION RATE: 20 BRPM

## 2017-04-03 LAB
C2 SERPL-MCNC: 2.8 MG/DL (ref 1.6–4)
CRYOGLOB SER QL 1D COLD INC: NORMAL

## 2017-04-03 NOTE — PROGRESS NOTES
Inbound call to the Navigator from [facility] care nurse named, Carlo White. HIPAA was verified by name and . Carlo White called to inform the Navigator that the patient is not taking the Humalog insulin as prescribed. The Lantus has been discontinued ans was replaced with Humalog SSI. The patient took 10 units of Lantus for the entire day. She does not have a glucometer at home. Record review reveals that the last glucometer prescription was written by a physician assistant in the ED during . The patient has completed a PCP office visit since . No mention of not having a glucometer was found within that visit. Plan: Will check this patient's insurance benefit for a glucometer during the morning of . Re-attempt to contact the patient to schedule post hospital follow up.

## 2017-04-04 ENCOUNTER — HOME CARE VISIT (OUTPATIENT)
Dept: SCHEDULING | Facility: HOME HEALTH | Age: 62
End: 2017-04-04
Payer: MEDICAID

## 2017-04-04 VITALS
SYSTOLIC BLOOD PRESSURE: 160 MMHG | DIASTOLIC BLOOD PRESSURE: 80 MMHG | OXYGEN SATURATION: 98 % | RESPIRATION RATE: 16 BRPM | HEART RATE: 88 BPM | TEMPERATURE: 98.3 F

## 2017-04-04 PROCEDURE — G0299 HHS/HOSPICE OF RN EA 15 MIN: HCPCS

## 2017-04-05 ENCOUNTER — HOME CARE VISIT (OUTPATIENT)
Dept: HOME HEALTH SERVICES | Facility: HOME HEALTH | Age: 62
End: 2017-04-05
Payer: MEDICAID

## 2017-04-06 ENCOUNTER — HOME CARE VISIT (OUTPATIENT)
Dept: SCHEDULING | Facility: HOME HEALTH | Age: 62
End: 2017-04-06
Payer: MEDICAID

## 2017-04-06 ENCOUNTER — PATIENT OUTREACH (OUTPATIENT)
Dept: INTERNAL MEDICINE CLINIC | Age: 62
End: 2017-04-06

## 2017-04-06 NOTE — LETTER
4/6/2017 3:12 PM 
 
Ms. Zo Gomez [address] [facility] [id number] Dear Ms. Francisco Jimenez am a Nurse Navigator working with Kelley Sotomayor MD 
  Part of my job is to follow up with patients who have been in the emergency department to see how they are feeling, answer any questions they may have about their visit and also make sure they have a follow-up appointment to see their primary care doctor. I have been unable to reach you by telephone and wanted to make sure that you scheduled a follow-up appointment to come in and talk to Dr. Theadore Litten  about your recent visit to the hospital.  Please call our office to schedule your appointment. In the meantime, if you have any questions or concerns, please feel free to call me on my direct line at [phone] or [phone] Thank you for allowing us to participate in your care! Sincerely, Sonja Mondragon RN

## 2017-04-06 NOTE — PROGRESS NOTES
Transition of Care  [facility] record has been reviewed. Home care notes reveal that the patient has access and a current prescription to obtain glucometer test strips and lancets. Navigator has multiple unsuccessful outreach attempts. Navigator obtained an updated contact   number for Mikaela Curry that was unreachable as well. Last PCP office visit was during  [address]. A referral for ID was entered during that visit. There are no antiretoviral medications listed on the medication list. Last CD4 count within the record was completed OCT16 and was low at 284. Last A1c was recorded MAR17 @5.4%    A get in touch letter has been sent. Will attempt to contact the patient in 2-3 days.

## 2017-04-06 NOTE — LETTER
April 6, 2017 [address] [place] [address] Lazaro Maldonado [address] [phone] Dear [address] [facility], Your doctor is interested in not only helping you feel better when you are sick, but also in keeping you from getting sick in the first place. In the spirit of maintaining your good health, our  
records indicate that you may be due for the following: 
 
Health Maintenance Due Topic Date Due  
 Albumin Urine Test  04/30/1964  Pneumococcal Vaccine (1 of 3 - PCV13) 04/30/1973  
 DTaP/Tdap/Td  (1 - Tdap) 04/30/1975  Cervical Cancer Screening  04/30/1975  Breast Cancer Screening  04/30/2004  Shingles Vaccine  04/30/2014 [facility] Eye Exam  03/16/2016  Flu Vaccine  08/01/2016 [facility] Diabetic Foot Care  08/27/2016 Please contact our office to make an appointment at your convenience, either by phone at [phone] or via [address] If you are not due for any of the preventive services listed above please notify us so we can update your records. We look forward to hearing from you soon. Sincerely, [address] [facility] 
[phone]

## 2017-04-07 ENCOUNTER — HOME CARE VISIT (OUTPATIENT)
Dept: HOME HEALTH SERVICES | Facility: HOME HEALTH | Age: 62
End: 2017-04-07
Payer: MEDICAID

## 2017-04-09 ENCOUNTER — HOME CARE VISIT (OUTPATIENT)
Dept: HOME HEALTH SERVICES | Facility: HOME HEALTH | Age: 62
End: 2017-04-09
Payer: MEDICAID

## 2017-04-14 ENCOUNTER — HOME CARE VISIT (OUTPATIENT)
Dept: SCHEDULING | Facility: HOME HEALTH | Age: 62
End: 2017-04-14
Payer: MEDICAID

## 2017-04-18 ENCOUNTER — HOME CARE VISIT (OUTPATIENT)
Dept: SCHEDULING | Facility: HOME HEALTH | Age: 62
End: 2017-04-18
Payer: MEDICAID

## 2017-04-20 ENCOUNTER — HOME CARE VISIT (OUTPATIENT)
Dept: SCHEDULING | Facility: HOME HEALTH | Age: 62
End: 2017-04-20
Payer: MEDICAID

## 2017-04-20 PROCEDURE — G0299 HHS/HOSPICE OF RN EA 15 MIN: HCPCS

## 2017-04-25 ENCOUNTER — HOME CARE VISIT (OUTPATIENT)
Dept: HOME HEALTH SERVICES | Facility: HOME HEALTH | Age: 62
End: 2017-04-25
Payer: MEDICAID

## 2017-04-28 ENCOUNTER — HOME CARE VISIT (OUTPATIENT)
Dept: SCHEDULING | Facility: HOME HEALTH | Age: 62
End: 2017-04-28
Payer: MEDICAID

## 2017-04-28 PROCEDURE — A6212 FOAM DRG <=16 SQ IN W/BORDER: HCPCS

## 2017-04-28 PROCEDURE — G0299 HHS/HOSPICE OF RN EA 15 MIN: HCPCS

## 2017-05-01 VITALS
RESPIRATION RATE: 18 BRPM | TEMPERATURE: 98.2 F | HEART RATE: 76 BPM | OXYGEN SATURATION: 97 % | SYSTOLIC BLOOD PRESSURE: 146 MMHG | DIASTOLIC BLOOD PRESSURE: 84 MMHG

## 2017-05-02 ENCOUNTER — HOME CARE VISIT (OUTPATIENT)
Dept: SCHEDULING | Facility: HOME HEALTH | Age: 62
End: 2017-05-02
Payer: MEDICAID

## 2017-05-02 PROCEDURE — G0299 HHS/HOSPICE OF RN EA 15 MIN: HCPCS

## 2017-05-03 VITALS
HEART RATE: 88 BPM | RESPIRATION RATE: 18 BRPM | SYSTOLIC BLOOD PRESSURE: 156 MMHG | OXYGEN SATURATION: 98 % | TEMPERATURE: 98 F | DIASTOLIC BLOOD PRESSURE: 78 MMHG

## 2017-05-06 ENCOUNTER — HOME CARE VISIT (OUTPATIENT)
Dept: HOME HEALTH SERVICES | Facility: HOME HEALTH | Age: 62
End: 2017-05-06
Payer: MEDICAID

## 2017-05-08 LAB
BACTERIA SPEC CULT: NORMAL
SERVICE CMNT-IMP: NORMAL

## 2017-05-10 ENCOUNTER — HOME CARE VISIT (OUTPATIENT)
Dept: SCHEDULING | Facility: HOME HEALTH | Age: 62
End: 2017-05-10
Payer: MEDICAID

## 2017-05-10 ENCOUNTER — HOSPITAL ENCOUNTER (INPATIENT)
Age: 62
LOS: 9 days | Discharge: HOME | DRG: 892 | End: 2017-05-19
Attending: EMERGENCY MEDICINE | Admitting: HOSPITALIST
Payer: MEDICAID

## 2017-05-10 DIAGNOSIS — L03.116 CELLULITIS OF LEFT LOWER EXTREMITY: Primary | ICD-10-CM

## 2017-05-10 DIAGNOSIS — D61.818 PANCYTOPENIA (HCC): ICD-10-CM

## 2017-05-10 DIAGNOSIS — B20 HIV (HUMAN IMMUNODEFICIENCY VIRUS INFECTION) (HCC): ICD-10-CM

## 2017-05-10 DIAGNOSIS — D69.2 PURPURA (HCC): ICD-10-CM

## 2017-05-10 PROBLEM — L03.115 CELLULITIS OF RIGHT LEG: Status: ACTIVE | Noted: 2017-05-10

## 2017-05-10 LAB
ALBUMIN SERPL BCP-MCNC: 1.8 G/DL (ref 3.5–5)
ALBUMIN/GLOB SERPL: 0.3 {RATIO} (ref 1.1–2.2)
ALP SERPL-CCNC: 116 U/L (ref 45–117)
ALT SERPL-CCNC: 85 U/L (ref 12–78)
ANION GAP BLD CALC-SCNC: 7 MMOL/L (ref 5–15)
AST SERPL W P-5'-P-CCNC: 118 U/L (ref 15–37)
BASOPHILS # BLD AUTO: 0 K/UL (ref 0–0.1)
BASOPHILS # BLD: 1 % (ref 0–1)
BILIRUB SERPL-MCNC: 0.6 MG/DL (ref 0.2–1)
BUN SERPL-MCNC: 18 MG/DL (ref 6–20)
BUN/CREAT SERPL: 20 (ref 12–20)
CALCIUM SERPL-MCNC: 8 MG/DL (ref 8.5–10.1)
CHLORIDE SERPL-SCNC: 108 MMOL/L (ref 97–108)
CO2 SERPL-SCNC: 24 MMOL/L (ref 21–32)
CREAT SERPL-MCNC: 0.9 MG/DL (ref 0.55–1.02)
DIFFERENTIAL METHOD BLD: ABNORMAL
EOSINOPHIL # BLD: 0.2 K/UL (ref 0–0.4)
EOSINOPHIL NFR BLD: 5 % (ref 0–7)
ERYTHROCYTE [DISTWIDTH] IN BLOOD BY AUTOMATED COUNT: 24.2 % (ref 11.5–14.5)
GLOBULIN SER CALC-MCNC: 6.9 G/DL (ref 2–4)
GLUCOSE BLD STRIP.AUTO-MCNC: 116 MG/DL (ref 65–100)
GLUCOSE BLD STRIP.AUTO-MCNC: 243 MG/DL (ref 65–100)
GLUCOSE SERPL-MCNC: 150 MG/DL (ref 65–100)
HCT VFR BLD AUTO: 29.8 % (ref 35–47)
HGB BLD-MCNC: 9.7 G/DL (ref 11.5–16)
LACTATE SERPL-SCNC: 1.4 MMOL/L (ref 0.4–2)
LYMPHOCYTES # BLD AUTO: 35 % (ref 12–49)
LYMPHOCYTES # BLD: 1.1 K/UL (ref 0.8–3.5)
MCH RBC QN AUTO: 27.6 PG (ref 26–34)
MCHC RBC AUTO-ENTMCNC: 32.6 G/DL (ref 30–36.5)
MCV RBC AUTO: 84.9 FL (ref 80–99)
MONOCYTES # BLD: 0.2 K/UL (ref 0–1)
MONOCYTES NFR BLD AUTO: 8 % (ref 5–13)
NEUTS SEG # BLD: 1.5 K/UL (ref 1.8–8)
NEUTS SEG NFR BLD AUTO: 51 % (ref 32–75)
PLATELET # BLD AUTO: 131 K/UL (ref 150–400)
POTASSIUM SERPL-SCNC: 3.6 MMOL/L (ref 3.5–5.1)
PROT SERPL-MCNC: 8.7 G/DL (ref 6.4–8.2)
RBC # BLD AUTO: 3.51 M/UL (ref 3.8–5.2)
RBC MORPH BLD: ABNORMAL
SERVICE CMNT-IMP: ABNORMAL
SERVICE CMNT-IMP: ABNORMAL
SODIUM SERPL-SCNC: 139 MMOL/L (ref 136–145)
WBC # BLD AUTO: 3 K/UL (ref 3.6–11)

## 2017-05-10 PROCEDURE — 85025 COMPLETE CBC W/AUTO DIFF WBC: CPT | Performed by: EMERGENCY MEDICINE

## 2017-05-10 PROCEDURE — 83605 ASSAY OF LACTIC ACID: CPT | Performed by: EMERGENCY MEDICINE

## 2017-05-10 PROCEDURE — 74011636637 HC RX REV CODE- 636/637: Performed by: HOSPITALIST

## 2017-05-10 PROCEDURE — 74011250637 HC RX REV CODE- 250/637: Performed by: HOSPITALIST

## 2017-05-10 PROCEDURE — 87186 SC STD MICRODIL/AGAR DIL: CPT | Performed by: EMERGENCY MEDICINE

## 2017-05-10 PROCEDURE — 87077 CULTURE AEROBIC IDENTIFY: CPT | Performed by: EMERGENCY MEDICINE

## 2017-05-10 PROCEDURE — 87205 SMEAR GRAM STAIN: CPT | Performed by: EMERGENCY MEDICINE

## 2017-05-10 PROCEDURE — 36415 COLL VENOUS BLD VENIPUNCTURE: CPT | Performed by: EMERGENCY MEDICINE

## 2017-05-10 PROCEDURE — 80053 COMPREHEN METABOLIC PANEL: CPT | Performed by: EMERGENCY MEDICINE

## 2017-05-10 PROCEDURE — 74011250636 HC RX REV CODE- 250/636: Performed by: EMERGENCY MEDICINE

## 2017-05-10 PROCEDURE — 99284 EMERGENCY DEPT VISIT MOD MDM: CPT

## 2017-05-10 PROCEDURE — 82962 GLUCOSE BLOOD TEST: CPT

## 2017-05-10 PROCEDURE — 74011250636 HC RX REV CODE- 250/636: Performed by: HOSPITALIST

## 2017-05-10 PROCEDURE — 96365 THER/PROPH/DIAG IV INF INIT: CPT

## 2017-05-10 PROCEDURE — 87040 BLOOD CULTURE FOR BACTERIA: CPT | Performed by: EMERGENCY MEDICINE

## 2017-05-10 PROCEDURE — 65270000029 HC RM PRIVATE

## 2017-05-10 RX ORDER — SPIRONOLACTONE 25 MG/1
75 TABLET ORAL DAILY
Status: DISCONTINUED | OUTPATIENT
Start: 2017-05-11 | End: 2017-05-10

## 2017-05-10 RX ORDER — LACTULOSE 10 G/15ML
10 SOLUTION ORAL; RECTAL 2 TIMES DAILY
COMMUNITY
End: 2017-10-15

## 2017-05-10 RX ORDER — SODIUM CHLORIDE 0.9 % (FLUSH) 0.9 %
5-10 SYRINGE (ML) INJECTION EVERY 8 HOURS
Status: DISCONTINUED | OUTPATIENT
Start: 2017-05-10 | End: 2017-05-19 | Stop reason: HOSPADM

## 2017-05-10 RX ORDER — SODIUM CHLORIDE 9 MG/ML
50 INJECTION, SOLUTION INTRAVENOUS CONTINUOUS
Status: DISPENSED | OUTPATIENT
Start: 2017-05-10 | End: 2017-05-11

## 2017-05-10 RX ORDER — INSULIN LISPRO 100 [IU]/ML
INJECTION, SOLUTION INTRAVENOUS; SUBCUTANEOUS
Status: DISCONTINUED | OUTPATIENT
Start: 2017-05-10 | End: 2017-05-19 | Stop reason: HOSPADM

## 2017-05-10 RX ORDER — MAGNESIUM SULFATE 100 %
4 CRYSTALS MISCELLANEOUS AS NEEDED
Status: DISCONTINUED | OUTPATIENT
Start: 2017-05-10 | End: 2017-05-19 | Stop reason: HOSPADM

## 2017-05-10 RX ORDER — OXYCODONE HYDROCHLORIDE 5 MG/1
10 TABLET ORAL
Status: DISCONTINUED | OUTPATIENT
Start: 2017-05-10 | End: 2017-05-19 | Stop reason: HOSPADM

## 2017-05-10 RX ORDER — AMLODIPINE BESYLATE 5 MG/1
10 TABLET ORAL DAILY
Status: DISCONTINUED | OUTPATIENT
Start: 2017-05-11 | End: 2017-05-19 | Stop reason: HOSPADM

## 2017-05-10 RX ORDER — HEPARIN SODIUM 5000 [USP'U]/ML
5000 INJECTION, SOLUTION INTRAVENOUS; SUBCUTANEOUS EVERY 8 HOURS
Status: DISCONTINUED | OUTPATIENT
Start: 2017-05-10 | End: 2017-05-19 | Stop reason: HOSPADM

## 2017-05-10 RX ORDER — IPRATROPIUM BROMIDE AND ALBUTEROL SULFATE 2.5; .5 MG/3ML; MG/3ML
3 SOLUTION RESPIRATORY (INHALATION)
Status: DISCONTINUED | OUTPATIENT
Start: 2017-05-10 | End: 2017-05-19 | Stop reason: HOSPADM

## 2017-05-10 RX ORDER — PROPRANOLOL HYDROCHLORIDE 10 MG/1
10 TABLET ORAL 2 TIMES DAILY
Status: DISCONTINUED | OUTPATIENT
Start: 2017-05-10 | End: 2017-05-19 | Stop reason: HOSPADM

## 2017-05-10 RX ORDER — SPIRONOLACTONE 25 MG/1
25 TABLET ORAL DAILY
COMMUNITY
End: 2017-09-05 | Stop reason: SDUPTHER

## 2017-05-10 RX ORDER — LANOLIN ALCOHOL/MO/W.PET/CERES
325 CREAM (GRAM) TOPICAL 2 TIMES DAILY WITH MEALS
Status: DISCONTINUED | OUTPATIENT
Start: 2017-05-10 | End: 2017-05-13

## 2017-05-10 RX ORDER — LACTULOSE 10 G/15ML
20 SOLUTION ORAL; RECTAL 2 TIMES DAILY
COMMUNITY
End: 2017-05-10 | Stop reason: SDUPTHER

## 2017-05-10 RX ORDER — VANCOMYCIN HYDROCHLORIDE
1250
Status: COMPLETED | OUTPATIENT
Start: 2017-05-10 | End: 2017-05-10

## 2017-05-10 RX ORDER — AMLODIPINE BESYLATE 10 MG/1
10 TABLET ORAL DAILY
COMMUNITY

## 2017-05-10 RX ORDER — SPIRONOLACTONE 25 MG/1
25 TABLET ORAL DAILY
Status: DISCONTINUED | OUTPATIENT
Start: 2017-05-11 | End: 2017-05-19 | Stop reason: HOSPADM

## 2017-05-10 RX ORDER — LEVOFLOXACIN 5 MG/ML
750 INJECTION, SOLUTION INTRAVENOUS EVERY 24 HOURS
Status: DISCONTINUED | OUTPATIENT
Start: 2017-05-10 | End: 2017-05-12

## 2017-05-10 RX ORDER — DEXTROSE 50 % IN WATER (D50W) INTRAVENOUS SYRINGE
12.5-25 AS NEEDED
Status: DISCONTINUED | OUTPATIENT
Start: 2017-05-10 | End: 2017-05-16 | Stop reason: ALTCHOICE

## 2017-05-10 RX ORDER — SODIUM CHLORIDE 0.9 % (FLUSH) 0.9 %
5-10 SYRINGE (ML) INJECTION AS NEEDED
Status: DISCONTINUED | OUTPATIENT
Start: 2017-05-10 | End: 2017-05-19 | Stop reason: HOSPADM

## 2017-05-10 RX ORDER — ASCORBIC ACID 500 MG
500 TABLET ORAL DAILY
Status: DISCONTINUED | OUTPATIENT
Start: 2017-05-11 | End: 2017-05-19 | Stop reason: HOSPADM

## 2017-05-10 RX ADMIN — HEPARIN SODIUM 5000 UNITS: 5000 INJECTION, SOLUTION INTRAVENOUS; SUBCUTANEOUS at 22:30

## 2017-05-10 RX ADMIN — OXYCODONE HYDROCHLORIDE 10 MG: 5 TABLET ORAL at 14:58

## 2017-05-10 RX ADMIN — LEVOFLOXACIN 750 MG: 5 INJECTION, SOLUTION INTRAVENOUS at 15:18

## 2017-05-10 RX ADMIN — OXYCODONE HYDROCHLORIDE 10 MG: 5 TABLET ORAL at 20:38

## 2017-05-10 RX ADMIN — Medication 10 ML: at 15:00

## 2017-05-10 RX ADMIN — HEPARIN SODIUM 5000 UNITS: 5000 INJECTION, SOLUTION INTRAVENOUS; SUBCUTANEOUS at 14:58

## 2017-05-10 RX ADMIN — INSULIN LISPRO 3 UNITS: 100 INJECTION, SOLUTION INTRAVENOUS; SUBCUTANEOUS at 16:30

## 2017-05-10 RX ADMIN — SODIUM CHLORIDE 50 ML/HR: 900 INJECTION, SOLUTION INTRAVENOUS at 15:00

## 2017-05-10 RX ADMIN — FERROUS SULFATE TAB 325 MG (65 MG ELEMENTAL FE) 325 MG: 325 (65 FE) TAB at 16:15

## 2017-05-10 RX ADMIN — Medication 10 ML: at 22:00

## 2017-05-10 RX ADMIN — LACTULOSE 10 G: 20 SOLUTION ORAL at 16:51

## 2017-05-10 RX ADMIN — VANCOMYCIN HYDROCHLORIDE 1250 MG: 10 INJECTION, POWDER, LYOPHILIZED, FOR SOLUTION INTRAVENOUS at 11:54

## 2017-05-10 RX ADMIN — PROPRANOLOL HYDROCHLORIDE 10 MG: 10 TABLET ORAL at 16:51

## 2017-05-10 NOTE — PROGRESS NOTES
Primary Nurse Yusef Strickland and Jd Saenz RN performed a dual skin assessment on this patient Impairment noted- see wound doc flow sheet. Patient has multiple sores to BLE, sore on R lateral leg is draining pus like substance. Aden score is 14.

## 2017-05-10 NOTE — IP AVS SNAPSHOT
Current Discharge Medication List  
  
START taking these medications Dose & Instructions Dispensing Information Comments Morning Noon Evening Bedtime  
 doxycycline 100 mg capsule Commonly known as:  Oriana Leung Your last dose was: Your next dose is:    
   
   
 Dose:  100 mg Take 1 Cap by mouth two (2) times a day for 5 days. Quantity:  10 Cap Refills:  0  
     
   
   
   
  
 ertapenem 625 mg/2 mL in lidocaine injection Your last dose was: Your next dose is:    
   
   
 Dose:  1 g  
3.2 mL by IntraMUSCular route daily. Saturday 5/20 10:30 at [address] Sunday 5/21 10:30am [address] Monday 5/22 10:00am [address] [facility] Tuesday 5/23 10:00am [address] [facility] [address] Wednesday 5/24 11:00am [address] [facility] Quantity:  5 g Refills:  0  
     
   
   
   
  
 rifAXIMin 550 mg tablet Commonly known as:  Jackie Fish Your last dose was: Your next dose is:    
   
   
 Dose:  550 mg Take 1 Tab by mouth two (2) times a day. Quantity:  60 Tab Refills:  2 CONTINUE these medications which have CHANGED Dose & Instructions Dispensing Information Comments Morning Noon Evening Bedtime  
 lactulose 10 gram/15 mL solution Commonly known as:  Olivier Aggarwal What changed:  Another medication with the same name was removed. Continue taking this medication, and follow the directions you see here. Your last dose was: Your next dose is:    
   
   
 Dose:  10 g Take 10 g by mouth two (2) times a day. Refills:  0  
     
   
   
   
  
 spironolactone 25 mg tablet Commonly known as:  ALDACTONE What changed:  Another medication with the same name was removed. Continue taking this medication, and follow the directions you see here. Your last dose was: Your next dose is:    
   
   
 Dose:  25 mg Take 25 mg by mouth daily. Refills:  0 CONTINUE these medications which have NOT CHANGED Dose & Instructions Dispensing Information Comments Morning Noon Evening Bedtime  
 albuterol 90 mcg/actuation inhaler Commonly known as:  PROVENTIL HFA, VENTOLIN HFA, PROAIR HFA Your last dose was: Your next dose is:    
   
   
 Dose:  2 Puff Take 2 puffs by inhalation every four (4) hours as needed for Wheezing. Quantity:  1 Inhaler Refills:  12  
     
   
   
   
  
 APIDRA 100 unit/mL injection Generic drug:  insulin glulisine Your last dose was: Your next dose is:    
   
   
 Dose:  10 Units 10 Units by SubCUTAneous route Daily (before breakfast). Refills:  0  
     
   
   
   
  
 ferrous sulfate 325 mg (65 mg iron) tablet Your last dose was: Your next dose is:    
   
   
 Dose:  325 mg Take 1 Tab by mouth two (2) times daily (with meals). Quantity:  60 Tab Refills:  1 FISH OIL 1,000 mg Cap Generic drug:  omega-3 fatty acids-vitamin e Your last dose was: Your next dose is:    
   
   
 Dose:  1 Cap Take 1 Cap by mouth every other day. Refills:  0  
     
   
   
   
  
 fluticasone-salmeterol 250-50 mcg/dose diskus inhaler Commonly known as:  ADVAIR Your last dose was: Your next dose is:    
   
   
 Dose:  1 Puff Take 1 Puff by inhalation two (2) times a day. Quantity:  1 Inhaler Refills:  12  
     
   
   
   
  
 insulin lispro 100 unit/mL kwikpen Commonly known as:  HumaLOG KwikPen Your last dose was: Your next dose is:    
   
   
 Dose:  1 Units 1 Units by SubCUTAneous route Before breakfast, lunch, dinner and at bedtime. 150-200=2 nits 201-250=4 units 251-300=6 units 301-350=8 units 351-400=10 units More than 400 call MD  
 Quantity:  15 mL Refills:  1 NORVASC 10 mg tablet Generic drug:  amLODIPine Your last dose was: Your next dose is:    
   
   
 Dose:  10 mg Take 10 mg by mouth daily. Refills:  0  
     
   
   
   
  
 oxyCODONE IR 10 mg Tab immediate release tablet Commonly known as:  Tata Camarena Your last dose was: Your next dose is:    
   
   
 Dose:  10 mg Take 1 Tab by mouth every four (4) hours as needed. Max Daily Amount: 60 mg.  
 Quantity:  30 Tab Refills:  0  
     
   
   
   
  
 propranolol 10 mg tablet Commonly known as:  INDERAL Your last dose was: Your next dose is:    
   
   
 Dose:  10 mg Take 1 Tab by mouth two (2) times a day. Quantity:  60 Tab Refills:  1 SPIRIVA WITH HANDIHALER 18 mcg inhalation capsule Generic drug:  tiotropium Your last dose was: Your next dose is:    
   
   
 inhale the contents of one capsule in the handihaler once daily Quantity:  30 Cap Refills:  2  
     
   
   
   
  
 triamcinolone 0.5 % topical cream  
Commonly known as:  ARISTOCORT Your last dose was: Your next dose is:    
   
   
 Apply  to affected area two (2) times a day. use thin layer Quantity:  45 g Refills:  5 VITAMIN C 1,000 mg tablet Generic drug:  ascorbic acid (vitamin C) Your last dose was: Your next dose is:    
   
   
 Dose:  1000 mg Take 1,000 mg by mouth every other day. Refills:  0 STOP taking these medications Blood-Glucose Meter monitoring kit  
   
  
 glucose blood VI test strips strip Commonly known as:  TRUETEST TEST STRIPS Insulin Needles (Disposable) 31 gauge x 3/16\" Ndle Commonly known as:  BD INSULIN PEN NEEDLE UF MINI Lancets Misc Nebulizer & Compressor machine TRUEPLUS LANCETS 33 gauge Misc Generic drug:  lancets Where to Get Your Medications Information on where to get these meds will be given to you by the nurse or doctor. ! Ask your nurse or doctor about these medications  
  doxycycline 100 mg capsule  
 ertapenem 625 mg/2 mL in lidocaine injection  
 rifAXIMin 550 mg tablet

## 2017-05-10 NOTE — PROGRESS NOTES
Admission Medication Reconciliation:    Information obtained from: Patient, daughter, RX query, discharge summary from [facility] on 3/30/17    Significant PMH/Disease States:   Past Medical History:   Diagnosis Date    Allergic rhinitis, cause unspecified 7/21/2011    Arthritis 7/21/2011    Asthma     Burning with urination     Chronic hepatitis C without mention of hepatic coma 7/21/2011    Degenerative Disc Disease 7/21/2011    Degenerative Joint Disease 7/21/2011    Diabetes ([facility]     Type II    HIV (human immunodeficiency virus infection) ([facility] 7/21/2011    Hypertension     Insomnia 7/21/2011    Multiple open wounds of lower leg 12/3/2015    Pulmonary nodules 7/21/2011       Chief Complaint for this Admission:  Leg swelling    Allergies:  Codeine; Penicillin v; and Pollen extracts    Prior to Admission Medications:   Prior to Admission Medications   Prescriptions Last Dose Informant Patient Reported? Taking? SPIRIVA WITH HANDIHALER 18 mcg inhalation capsule 5/9/2017 at Unknown time  No Yes   Sig: inhale the contents of one capsule in the handihaler once daily   albuterol (PROVENTIL HFA, VENTOLIN HFA, PROAIR HFA) 90 mcg/actuation inhaler   No Yes   Sig: Take 2 puffs by inhalation every four (4) hours as needed for Wheezing. amLODIPine (NORVASC) 10 mg tablet   Yes Yes   Sig: Take 10 mg by mouth daily. ascorbic acid (VITAMIN C) 1,000 mg tablet 5/9/2017 at Unknown time  Yes Yes   Sig: Take 1,000 mg by mouth every other day. ferrous sulfate 325 mg (65 mg iron) tablet 5/9/2017 at Unknown time  No Yes   Sig: Take 1 Tab by mouth two (2) times daily (with meals). fluticasone-salmeterol (ADVAIR) 250-50 mcg/dose diskus inhaler 5/9/2017 at Unknown time  No Yes   Sig: Take 1 Puff by inhalation two (2) times a day. insulin glulisine (APIDRA) 100 unit/mL injection 5/9/2017 at Unknown time  Yes Yes   Sig: 10 Units by SubCUTAneous route Daily (before breakfast).    insulin lispro (HUMALOG KWIKPEN) 100 unit/mL kwikpen 2017 at Unknown time  No Yes   Si Units by SubCUTAneous route Before breakfast, lunch, dinner and at bedtime. 150-200=2 nits  201-250=4 units  251-300=6 units  301-350=8 units  351-400=10 units  More than 400 call MD   lactulose (CHRONULAC) 10 gram/15 mL solution 2017 at Unknown time  Yes Yes   Sig: Take 10 g by mouth two (2) times a day. omega-3 fatty acids-vitamin e (FISH OIL) 1,000 mg Cap 2017 at Unknown time  Yes Yes   Sig: Take 1 Cap by mouth every other day. oxyCODONE IR (ROXICODONE) 10 mg tab immediate release tablet 2017 at Unknown time  No Yes   Sig: Take 1 Tab by mouth every four (4) hours as needed. Max Daily Amount: 60 mg.   propranolol (INDERAL) 10 mg tablet 2017 at Unknown time  No Yes   Sig: Take 1 Tab by mouth two (2) times a day. spironolactone (ALDACTONE) 25 mg tablet 5/10/2017 at Unknown time  Yes Yes   Sig: Take 25 mg by mouth daily. triamcinolone (ARISTOCORT) 0.5 % topical cream   No yes   Sig: Apply  to affected area two (2) times a day. use thin layer      Facility-Administered Medications: None         Comments/Recommendations:   1. Removed Ceftin  2. Changed spironolactone 75mg to 25mg daily. Patient it supposed to be taking 75mg but only takes 25mg. 3. Added Norvasc, Apidra, lactulose.

## 2017-05-10 NOTE — H&P
[address]   [address]   HISTORY AND PHYSICAL       Name:  Shellie Smith   MR#:  [id number]   :  1955   Account #:  [de-identified]        Date of Adm:  05/10/2017       PRIMARY CARE PROVIDER: Karma Holm MD    SOURCE OF INFORMATION: The patient and her daughter at the   bedside. CHIEF COMPLAINT: Right lower leg wound and pain. HISTORY OF PRESENT ILLNESS: This is a 58-year-old [facility]   American woman with past medical history significant for HIV/AIDS   who was diagnosed 20 years ago has lost to followup and not on   HAART treatment currently, last CD4 count was 363 with a high   viral load, chronic hepatitis C and liver cirrhosis, hypertension,   degenerative joint disease, degenerative disk disease, diabetes   mellitus type 2, right lower leg ulcer, bilateral lower leg wound, possible   vasculitis and anemia, who presented to [facility] Emergency   Department with progressive pain and swelling of the right lower leg. The patient discharged from hospital on 2017, after she was   treated for bilateral lower extremity wounds. She has a home   wound care nurse who comes over to her home every other 2-3 days and per   her daughter, when she dressed patient's right lower leg wound, she noted   greenish yellowish discharge. Patient also complains constant   pain, 10/10, associated with swelling, and for this reason she decided to   come to [facility] Emergency Department. No fever, chills, sweating,   cough, left side chest pain, palpitation, nausea, abdominal pain or   abnormal bowel movements. In the ER, her vital signs: Blood pressure   188/88, temperature 98.2, respiratory rate 16, saturation of oxygen   100%. She received IV vancomycin. Infectious Diseases is consulted   and the patient referred to the hospitalist service for further evaluation   and admission. REVIEW OF SYSTEMS: Pertinent positive finding mentioned in HPI. All systems reviewed, not any other positive finding. PAST MEDICAL HISTORY   1. HIV/AIDS. Her last CD4 count 363 with high viral load, diagnosed 20   years ago, was lost to followup and not currently on treatment. 2. History of asthma. 3. Liver cirrhosis due to chronic hepatitis C.   4. Degenerative disk disease. 5. Degenerative joint disease. 6. Diabetes mellitus type 2.   7. Hypertension. 8. Bilateral lower leg wound. 9. History of pulmonary nodules. 10. Possible vasculitis. PRIOR TO ADMISSION MEDICATIONS INCLUDE   1. Cefuroxime 500 mg p.o. b.i.d.   2. Ferrous sulfate 325 mg p.o. b.i.d.   3. Roxicodone 1 tab p.o. every 4 hours as needed. 4. Propranolol 10 mg p.o. b.i.d.   5. Spironolactone 25 mg p.o. t.i.d.   6. Spiriva 18 mcg inhalation daily. 7. Omega-3 fatty acid 1 capsule every day. 8. Vitamin C 1 tab p.o. daily. ALLERGIES   1. CODEINE. 2. PENICILLIN V.   3. POLLEN EXTRACT. SOCIAL HISTORY: Lives with her daughter. Ambulates with a cane,   smokes a pack over 2 days. No alcohol abuse. CODE STATUS: FULL CODE. PHYSICAL EXAMINATION   VITAL SIGNS: Blood pressure 132/107, pulse 90, temperature 98.2,   respiratory rate 16, saturation of oxygen 100% on room air. GENERAL APPEARANCE: The patient is alert, cooperative, not in   distress. She appears her stated age. HEENT: Pink conjunctivae. Anicteric sclerae. Moist tongue and buccal   mucosa. LUNGS: Clear to auscultation bilaterally. CHEST WALL: No tenderness or deformity. CARDIOVASCULAR: Regular rate and rhythm. S1 and S2 normal. No   murmur or gallop. ABDOMEN: Soft, nontender. Bowel sounds normal. No mass or   organomegaly. EXTREMITIES: Bilateral trace pretibial and pedal edema. SKIN: Right lower leg 3 x 3 cm ulcer with surrounding cellulitis and   multiple skin healed ulcers on the lower extremities and multiple small   rash on the lower extremities.  CENTRAL NERVOUS SYSTEM:   Conscious, well oriented to time, place and person. Motor 5/5. Sensation intact. Cranial nerves II through XII grossly intact. LABORATORY: Hematology: White blood cell count 3, hemoglobin   9.7, hematocrit 29.8, MCV 84.9, platelet count 862. Chemistry: Sodium   139, potassium 3.6, chloride 108, CO2 24, anion gap 7, glucose 150,   BUN 18, creatinine 0.9, total protein 8.7, albumin 1.8, globulin 6.9, ALT   85, , alkaline phosphatase 116. Lactic acid 1.4. ASSESSMENT: This is a 76-year-old [place] American woman with   past medical history significant for human immunodeficiency   virus/acquired immunodeficiency syndrome, liver cirrhosis secondary   to chronic hepatitis C, hypertension, degenerative joint disease,   degenerative disk disease, diabetes mellitus type 2, chronic obstructive   pulmonary disease, asthma, anemia of chronic disease and possible   vasculitis, presented to [facility] Department with right   lower leg ulcer, pain and swelling, progressively worse for the last 2   days. 1. Right lower leg infected wound with bilateral lower leg wound and   ulcer. 2. Bilateral lower leg skin rash unclear etiology, concern for vasculitis   3. History of human immunodeficiency virus/acquired   immunodeficiency syndrome. 4. Diabetes mellitus type 2.   5. Hypertension. 6. Liver cirrhosis secondary to chronic hepatitis C.   7. History of chronic obstructive pulmonary disease. 8. Pancytopenia. PLAN   1. Right lower leg infected ulcer with bilateral lower leg ulcers. Admit   patient to medical floor. Start on vancomycin and Levaquin. THE   PATIENT IS ALLERGIC TO PENICILLIN. Follow up on wound culture. Consult wound care nurse and infectious disease specialist. The   patient has been treated for an unspecified ulcer. The patient might   have stasis ulcer versus vasculitis. May need to involve rheumatologist  2.  Bilateral lower extremity skin rash, possibly related to vasculitis,   possibly related to chronic hepatitis C. Continue monitoring and wound   care. 3. History of HIV/AIDS diagnosed 20 years ago. Last CD4 count is 363   and a high viral load. The patient was lost to followup for HAART   treatment. ID is consulted. 4. Diabetes mellitus type 2. Check hemoglobin A1c and monitor   fingerstick glucose and on insulin sliding scale. 5. Hypertension. Continue propranolol and monitor blood pressure. 6. Liver cirrhosis secondary to chronic hepatitis C. Monitor liver   function tests and outpatient followup and continue home   spironolactone. 7. Pancytopenia, likely related to her underlying HIV infection and   chronic hepatitis C. Monitor CBC. 8. COPD with history of asthma, stable. Continue p.r.n. DuoNeb   treatments and pulse oximetry monitoring. DVT prophylaxis, heparin.         MD ERROL Lou / [facility] [place] [facility]   D:  05/10/2017   13:05   T:  05/10/2017   14:04   Job #:  [id number]

## 2017-05-10 NOTE — IP AVS SNAPSHOT
[address] 
[phone] Patient: Gaby Bettencourt MRN: [id number] You are allergic to the following Allergen Reactions Codeine Itching Patient denies Codeine allergy Penicillin V Hives Patient denies penicillin allergy. Tolerated Merrem Pollen Extracts Runny Nose Itchy, headaches, sneezing Recent Documentation Height Weight BMI OB Status Smoking Status 1.676 m 64.5 kg 22.95 kg/m2 Hysterectomy Current Every Day Smoker Emergency Contacts Name Discharge Info Relation Home Work [employer] [facility] [facility] DISCHARGE CAREGIVER [3] Child [2] [phone] About your hospitalization You were admitted on:  May 10, 2017 You last received care in the:  [address] [place] [address] You were discharged on:  May 19, 2017 Unit phone number:  [phone] Why you were hospitalized Your primary diagnosis was:  Cellulitis Of Right Leg Your diagnoses also included:  Pancytopenia (Hcc), Diabetes Mellitus Type 2, Controlled (Hcc), Hiv (Human Immunodeficiency Virus Infection) (Hcc), Htn (Hypertension) Providers Seen During Your Hospitalizations Provider Role Specialty Primary office phone Sue Dennis MD Attending Provider Emergency Medicine [phone] Ronal Long MD Attending Provider Internal Medicine [phone] Vivek Saldana MD Attending Provider Internal Medicine [phone] Rigo Joel MD Attending Provider Internal Medicine [phone] Noman Healy MD Attending Provider [facility] [phone] Ronnie Killian MD Attending Provider Internal Medicine [phone] Your Primary Care Physician (PCP) Primary Care Physician Office Phone Office Fax [address] [email or web address] [address] [phone] Follow-up Information Follow up With Details Comments Contact Info Pam Brewster MD On 5/25/2017 For discharge follow up at 8:30AM  [address] 
[phone] [facility] to Saturday 5/20   at 10:30am    
 [facility] to Sunday 5/21/17  at 10:30am     
 [address] Go to Monday 5/22/17 10:00am   
 [facility] Go to Tuesday 5/23/17 10:00am   
 [address]  Go to Wednesday 5/24/17 11:00am   
 Lisbeth Amaya MD Schedule an appointment as soon as possible for a visit in 2 weeks For follow up of HIV, hepatitis C and leg wound [address] [place] [address] [address] [facility] [address] 13 
[phone] Aiyana Castillo MD Call If you want to persue a biopsy of your leg wound [address] [place] [address] 159 [facility] 
[phone] Your Appointments Saturday May 20, 2017 10:30 AM EDT INFUSION 90 RI with RM 102A- CHAIR [address] ([address]) 1114 [facility] [phone] Go to [address], ground floor. Hong May 21, 2017 10:30 AM EDT INFUSION 90 RI with RM 102A- CHAIR [facility]  
455 [facility] ([address]) 1114 [facility] [address] [phone]  
[phone] Go to [address], ground floor. Monday May 22, 2017 10:00 AM EDT INFUSION 90 RI with [address] [place] [address] 1  
[facility] ([facility]) [address] [place] [address] [facility] [address] [phone] [phone] Tuesday May 23, 2017 10:00 AM EDT INFUSION 90 RI with 860 [facility] 1  
[facility] ([facility]) [address] [place] [address] [address] 7 [phone] [phone] Wednesday May 24, 2017 11:00 AM EDT INFUSION 90 RI with 860 [facility] [address] 2  
[facility] - [facility]) [address] [place] [phone]  
[phone] Thursday May 25, 2017  8:30 AM EDT ROUTINE CARE with Damari Boswell MD  
[facility] - 710 [facility] ([address]) 2830 [facility],6Th Floor [facility] 7 [address]  
[phone] Current Discharge Medication List  
  
START taking these medications Dose & Instructions Dispensing Information Comments Morning Noon Evening Bedtime  
 doxycycline 100 mg capsule Commonly known as:  Oriana Leung Your last dose was: Your next dose is:    
   
   
 Dose:  100 mg Take 1 Cap by mouth two (2) times a day for 5 days. Quantity:  10 Cap Refills:  0  
     
   
   
   
  
 ertapenem 625 mg/2 mL in lidocaine injection Your last dose was: Your next dose is:    
   
   
 Dose:  1 g  
3.2 mL by IntraMUSCular route daily. Saturday 5/20 10:30 at [address] Sunday 5/21 10:30am [address] Monday 5/22 10:00am [address] [facility] Tuesday 5/23 10:00am [facility] Wednesday 5/24 11:00am [facility] Quantity:  5 g Refills:  0  
     
   
   
   
  
 rifAXIMin 550 mg tablet Commonly known as:  Jackie Fish Your last dose was: Your next dose is:    
   
   
 Dose:  550 mg Take 1 Tab by mouth two (2) times a day. Quantity:  60 Tab Refills:  2 CONTINUE these medications which have CHANGED Dose & Instructions Dispensing Information Comments Morning Noon Evening Bedtime  
 lactulose 10 gram/15 mL solution Commonly known as:  Olivier Aggarwal What changed:  Another medication with the same name was removed. Continue taking this medication, and follow the directions you see here. Your last dose was: Your next dose is:    
   
   
 Dose:  10 g Take 10 g by mouth two (2) times a day. Refills:  0  
     
   
   
   
  
 spironolactone 25 mg tablet Commonly known as:  ALDACTONE What changed:  Another medication with the same name was removed. Continue taking this medication, and follow the directions you see here. Your last dose was: Your next dose is:    
   
   
 Dose:  25 mg Take 25 mg by mouth daily. Refills:  0 CONTINUE these medications which have NOT CHANGED Dose & Instructions Dispensing Information Comments Morning Noon Evening Bedtime  
 albuterol 90 mcg/actuation inhaler Commonly known as:  PROVENTIL HFA, VENTOLIN HFA, PROAIR HFA Your last dose was: Your next dose is:    
   
   
 Dose:  2 Puff Take 2 puffs by inhalation every four (4) hours as needed for Wheezing. Quantity:  1 Inhaler Refills:  12  
     
   
   
   
  
 APIDRA 100 unit/mL injection Generic drug:  insulin glulisine Your last dose was: Your next dose is:    
   
   
 Dose:  10 Units 10 Units by SubCUTAneous route Daily (before breakfast). Refills:  0  
     
   
   
   
  
 ferrous sulfate 325 mg (65 mg iron) tablet Your last dose was: Your next dose is:    
   
   
 Dose:  325 mg Take 1 Tab by mouth two (2) times daily (with meals). Quantity:  60 Tab Refills:  1 FISH OIL 1,000 mg Cap Generic drug:  omega-3 fatty acids-vitamin e Your last dose was: Your next dose is:    
   
   
 Dose:  1 Cap Take 1 Cap by mouth every other day. Refills:  0  
     
   
   
   
  
 fluticasone-salmeterol 250-50 mcg/dose diskus inhaler Commonly known as:  ADVAIR Your last dose was: Your next dose is:    
   
   
 Dose:  1 Puff Take 1 Puff by inhalation two (2) times a day. Quantity:  1 Inhaler Refills:  12 insulin lispro 100 unit/mL kwikpen Commonly known as:  HumaLOG KwikPen Your last dose was: Your next dose is:    
   
   
 Dose:  1 Units 1 Units by SubCUTAneous route Before breakfast, lunch, dinner and at bedtime. 150-200=2 nits 201-250=4 units 251-300=6 units 301-350=8 units 351-400=10 units More than 400 call MD  
 Quantity:  15 mL Refills:  1 NORVASC 10 mg tablet Generic drug:  amLODIPine Your last dose was: Your next dose is:    
   
   
 Dose:  10 mg Take 10 mg by mouth daily. Refills:  0  
     
   
   
   
  
 oxyCODONE IR 10 mg Tab immediate release tablet Commonly known as:  Nikita Salen Your last dose was: Your next dose is:    
   
   
 Dose:  10 mg Take 1 Tab by mouth every four (4) hours as needed. Max Daily Amount: 60 mg.  
 Quantity:  30 Tab Refills:  0  
     
   
   
   
  
 propranolol 10 mg tablet Commonly known as:  INDERAL Your last dose was: Your next dose is:    
   
   
 Dose:  10 mg Take 1 Tab by mouth two (2) times a day. Quantity:  60 Tab Refills:  1 SPIRIVA WITH HANDIHALER 18 mcg inhalation capsule Generic drug:  tiotropium Your last dose was: Your next dose is:    
   
   
 inhale the contents of one capsule in the handihaler once daily Quantity:  30 Cap Refills:  2  
     
   
   
   
  
 triamcinolone 0.5 % topical cream  
Commonly known as:  ARISTOCORT Your last dose was: Your next dose is:    
   
   
 Apply  to affected area two (2) times a day. use thin layer Quantity:  45 g Refills:  5 VITAMIN C 1,000 mg tablet Generic drug:  ascorbic acid (vitamin C) Your last dose was: Your next dose is:    
   
   
 Dose:  1000 mg Take 1,000 mg by mouth every other day. Refills:  0 STOP taking these medications Blood-Glucose Meter monitoring kit glucose blood VI test strips strip Commonly known as:  TRUETEST TEST STRIPS Insulin Needles (Disposable) 31 gauge x 3/16\" Ndle Commonly known as:  BD INSULIN PEN NEEDLE UF MINI Lancets Misc Nebulizer & Compressor machine TRUEPLUS LANCETS 33 gauge Misc Generic drug:  lancets Where to Get Your Medications Information on where to get these meds will be given to you by the nurse or doctor. ! Ask your nurse or doctor about these medications  
  doxycycline 100 mg capsule  
 ertapenem 625 mg/2 mL in lidocaine injection  
 rifAXIMin 550 mg tablet Discharge Instructions Please bring this form with you to show your primary care provider at your follow-up appointment. Primary care provider:  Dr. Ladena Najjar., MD 
 
Discharging provider:  Bean Fox MD 
 
You have been admitted to the hospital with the following diagnoses: 
Cellulitis of right leg FOLLOW-UP CARE RECOMMENDATIONS: 
 
APPOINTMENTS: 
Follow-up Information Follow up With Details Comments Contact Info Ladena Najjar., MD On 5/25/2017 For discharge follow up at 8:30AM  [address] [place] [address] 
[phone] [address] [place] [address] to Saturday 5/20   at 10:30am    
 [address] [place] [address] to Sunday 5/21/17  at 10:30am     
 [facility] 99 Go to Monday 5/22/17 10:00am   
 [facility] Go to Tuesday 5/23/17 10:00am   
 [address]  Go to Wednesday 5/24/17 11:00am   
 Franklyn Muniz MD Schedule an appointment as soon as possible for a visit in 2 weeks For follow up of HIV, hepatitis C and leg wound [address] [place] [address] [address] [facility] [address] [place] [address] 
[phone] Cami Reinoso MD Call If you want to persue a biopsy of your leg wound [address] [place] [address] At [facility]  [address] [facility] 
[phone] SYMPTOMS to watch for: chest pain, shortness of breath, fever, chills, nausea, vomiting, diarrhea. DIET/what to eat:  Cardiac Diet ACTIVITY:  Activity as tolerated WOUND CARE: Please keep weight and pressure off of your leg wounds. It is fine to leave them uncovered. What to do if new or unexpected symptoms occur? If you experience any of the above symptoms (or should other concerns or questions arise after discharge) please call your primary care physician. Return to the emergency room if you cannot get hold of your doctor. · It is very important that you keep your follow-up appointment(s). · Please bring discharge papers, medication list (and/or medication bottles) to your follow-up appointments for review by your outpatient provider(s). · Please check the list of medications and be sure it includes every medication (even non-prescription medications) that your provider wants you to take. · It is important that you take the medication exactly as they are prescribed. · Keep your medication in the bottles provided by the pharmacist and keep a list of the medication names, dosages, and times to be taken in your wallet. · Do not take other medications without consulting your doctor. · If you have any questions about your medications or other instructions, please talk to your nurse or care provider before you leave the hospital. 
 
I understand that if any problems occur once I am at home I am to contact my physician. These instructions were explained to me and I had the opportunity to ask questions. Discharge Orders None [facility] Announcement We are excited to announce that we are making your provider's discharge notes available to you in [employer]. You will see these notes when they are completed and signed by the physician that discharged you from your recent hospital stay.   If you have any questions or concerns about any information you see in [employer], please call the Health Information Department where you were seen or reach out to your Primary Care Provider for more information about your plan of care. Introducing [facility]! Vincent Art introduces Postini patient portal. Now you can access parts of your medical record, email your doctor's office, and request medication refills online. 1. In your internet browser, go to [email or web address] 2. Click on the First Time User? Click Here link in the Sign In box. You will see the New Member Sign Up page. 3. Enter your Postini Access Code exactly as it appears below. You will not need to use this code after youve completed the sign-up process. If you do not sign up before the expiration date, you must request a new code. · Postini Access Code: [email or web address] Expires: 6/22/2017  7:09 PM 
 
4. Enter the last four digits of your Social Security Number (xxxx) and Date of Birth (mm/dd/yyyy) as indicated and click Submit. You will be taken to the next sign-up page. 5. Create a Postini ID. This will be your Postini login ID and cannot be changed, so think of one that is secure and easy to remember. 6. Create a Postini password. You can change your password at any time. 7. Enter your Password Reset Question and Answer. This can be used at a later time if you forget your password. 8. Enter your e-mail address. You will receive e-mail notification when new information is available in [address] 9. Click Sign Up. You can now view and download portions of your medical record. 10. Click the Download Summary menu link to download a portable copy of your medical information. If you have questions, please visit the Frequently Asked Questions section of the Postini website. Remember, [employer]ini is NOT to be used for urgent needs. For medical emergencies, dial 911. Now available from your iPhone and Android! General Information Please provide this summary of care documentation to your next provider. Patient Signature:  ____________________________________________________________ Date:  ____________________________________________________________  
  
Ethan Sport Provider Signature:  ____________________________________________________________ Date:  ____________________________________________________________

## 2017-05-10 NOTE — PROGRESS NOTES
Pharmacist Note - Vancomycin Dosing    Consult provided for this 60 y.o. female for indication of LE cellulitis. Antibiotic regimen(s): Levaquin + Vanc    Recent Labs      05/10/17   1041   WBC  3.0*   CREA  0.90   BUN  18     Frequency of BMP: daily  Height: 168 cm  Weight: 65 kg  Est CrCl: ~60 ml/min  Temp (24hrs), Av.4 °F (36.9 °C), Min:98.2 °F (36.8 °C), Max:98.6 °F (37 °C)    Cultures:  Blood (5/10)  Wound (5/10)    Goal trough = 15 - 20 mcg/mL (immunocompromised patient; prior cx grew MRSA with Vanc KEVAN of 2)    A 1250 mg loading dose was given in the ED ~noon today; follow with by a maintenance dose of 1000 mg IV every 16 hours. Pharmacy to follow patient daily and order levels / make dose adjustments as appropriate.

## 2017-05-10 NOTE — ED PROVIDER NOTES
HPI Comments: 60 y.o. female with past medical history significant for chronic hepatitis C, HIV, hypertension, DJD, DDD, arthritis, DM, asthma, partial hysterectomy and tubal ligation who presents from home with chief complaint of leg pain. Patient was admitted to [facility] from 3/24-3/30 (1.5 months ago) for leg cellulitis and infected wound. CD4 count at that time was 363 with high viral load. She had a bilateral lower right leg rash at that time with a painful ulcer related to erythema nodosum vs cryoglobulinemia with associated hepatitis C. She saw a rheumatologist in the hospitaland had a biopsy of the area which showed nonspecific necroinflammatory crust with scant viable dermis; AFB and gram stains negative. She was discharged on courses Bactrim and Avelox. Per daughter, patient finished her courses of antibiotics and was scheduled to follow up with Dr. Isrrael Ramos (Infectious disease) but never did after discharge. Patient has continued to experience RLE pain after discharge. She arrives today after 2 days ago developing severe pain from her RLE sores with drainage of a gooey pus-like substance. Patient reports associated severe headaches. She denies fever, chest pain, shortness of breath or abdominal pain. There are no other acute medical concerns at this time. Social hx: Everyday smoker. Heroin use. Cocaine use. PCP: Fernando Hudson MD    Note written by sindy Marks, as dictated by Evgeny Richardson MD 10:47 AM      The history is provided by the patient.         Past Medical History:   Diagnosis Date    Allergic rhinitis, cause unspecified 7/21/2011    Arthritis 7/21/2011    Asthma     Burning with urination     Chronic hepatitis C without mention of hepatic coma 7/21/2011    Degenerative Disc Disease 7/21/2011    Degenerative Joint Disease 7/21/2011    Diabetes [facility] [address]     Type II    HIV (human immunodeficiency virus infection) ([facility] [address] 7/21/2011    Hypertension     Insomnia 7/21/2011    Multiple open wounds of lower leg 12/3/2015    Pulmonary nodules 7/21/2011       Past Surgical History:   Procedure Laterality Date    HX DILATION AND CURETTAGE  1970    HX GYN  1998    partial hystecomy    HX ORTHOPAEDIC      pins/screws in left leg    HX TUBAL LIGATION           Family History:   Problem Relation Age of Onset    Diabetes Mother     Hypertension Mother     Diabetes Father        Social History     Social History    Marital status: SINGLE     Spouse name: N/A    Number of children: N/A    Years of education: N/A     Occupational History    Not on file. Social History Main Topics    Smoking status: Current Every Day Smoker     Packs/day: 0.50     Years: 15.00     Types: Cigarettes    Smokeless tobacco: Never Used    Alcohol use No    Drug use: No    Sexual activity: Not Currently     Other Topics Concern    Not on file     Social History Narrative         ALLERGIES: Codeine; Penicillin v; and Pollen extracts    Review of Systems   Constitutional: Negative for fever. Respiratory: Negative for shortness of breath. Cardiovascular: Positive for leg swelling. Negative for chest pain. Gastrointestinal: Negative for abdominal pain. Skin: Positive for wound. Neurological: Positive for headaches. All other systems reviewed and are negative. Vitals:    05/10/17 0923   BP: 188/88   Pulse: 90   Resp: 16   Temp: 98.2 °F (36.8 °C)   SpO2: 100%   Height: 5' 6\" (1.676 m)            Physical Exam     Nursing note and vitals reviewed. Constitutional: APPEARS CHRONICALLY ILL. No distress. HENT:   Head: Normocephalic and atraumatic. Sclera anicteric  Nose: No rhinorrhea  Mouth/Throat: Oropharynx is clear and moist. Pharynx normal  Eyes: Conjunctivae are normal. Pupils are equal, round, and reactive to light. Right eye exhibits no discharge. Left eye exhibits no discharge. No scleral icterus. Neck: Painless normal range of motion.  Supple  Cardiovascular: Normal rate, regular rhythm, normal heart sounds and intact distal pulses. Exam reveals no gallop and no friction rub. No murmur heard. Pulmonary/Chest: Effort normal and breath sounds normal. No respiratory distress. no wheezes. no rales. Abdominal: Soft. Bowel sounds are normal. Exhibits no distension and no mass. No tenderness. No guarding. Musculoskeletal: Normal range of motion. Lymphadenopathy:   No cervical adenopathy. Neurological:  Alert and oriented to person, place, and time. Coordination normal. CN 2-12 intact. Moving all extremities. Skin: Skin is warm and dry. PURPURIC RASH ON LOWER EXTREMITIES. No pallor. RIGHT LEG WITH OPEN WOUNDS DRAINING PURULENT DRAINAGE. MDM  ED Course   70-year-old female with a history of HIV not on anti-retroviral therapy and recent hospitalization for saline this secondary to group A strep and MRSA here with worsening leg wounds. Rash seems somewhat consistent with vasculitis. Differential diagnosis includes vasculitis related wound, cellulitis, and others. We'll give vancomycin, check labs. Culture obtained from the wound. Procedures  10:39 AM  Attempted to get in contact with Dr. Quinn Alcaraz. He is out of town with a conference and there is nobody covering for his patient. ID advised us to call the hospitalist.    CONSULT NOTE:  11:23 More Good MD spoke with Dr. Amina Coles, Consult for Infectious disease. Discussed available diagnostic tests and clinical findings. He recommends giving vancomycin and to admit to the hospitalist.    11:24 AM  Patient is being admitted to the hospital.  The results of their tests and reasons for their admission have been discussed with them and/or available family. They convey agreement and understanding for the need to be admitted and for their admission diagnosis. Consultation will be made now with the inpatient physician for hospitalization.     CONSULT NOTE:  12:08 PM Libertad Perkins MD spoke with Dr. Elana Akbar, Consult for Hospitalist.  Discussed available diagnostic tests and clinical findings. He is in agreement with care plans as outlined and will admit. Recent Results (from the past 24 hour(s))   CBC WITH AUTOMATED DIFF    Collection Time: 05/10/17 10:41 AM   Result Value Ref Range    WBC 3.0 (L) 3.6 - 11.0 K/uL    RBC 3.51 (L) 3.80 - 5.20 M/uL    HGB 9.7 (L) 11.5 - 16.0 g/dL    HCT 29.8 (L) 35.0 - 47.0 %    MCV 84.9 80.0 - 99.0 FL    MCH 27.6 26.0 - 34.0 PG    MCHC 32.6 30.0 - 36.5 g/dL    RDW 24.2 (H) 11.5 - 14.5 %    PLATELET 071 (L) 500 - 400 K/uL    NEUTROPHILS 51 32 - 75 %    LYMPHOCYTES 35 12 - 49 %    MONOCYTES 8 5 - 13 %    EOSINOPHILS 5 0 - 7 %    BASOPHILS 1 0 - 1 %    ABS. NEUTROPHILS 1.5 (L) 1.8 - 8.0 K/UL    ABS. LYMPHOCYTES 1.1 0.8 - 3.5 K/UL    ABS. MONOCYTES 0.2 0.0 - 1.0 K/UL    ABS. EOSINOPHILS 0.2 0.0 - 0.4 K/UL    ABS. BASOPHILS 0.0 0.0 - 0.1 K/UL    DF SMEAR SCANNED      RBC COMMENTS ANISOCYTOSIS  2+        RBC COMMENTS MICROCYTOSIS  1+        RBC COMMENTS ROULEAUX  PRESENT       METABOLIC PANEL, COMPREHENSIVE    Collection Time: 05/10/17 10:41 AM   Result Value Ref Range    Sodium 139 136 - 145 mmol/L    Potassium 3.6 3.5 - 5.1 mmol/L    Chloride 108 97 - 108 mmol/L    CO2 24 21 - 32 mmol/L    Anion gap 7 5 - 15 mmol/L    Glucose 150 (H) 65 - 100 mg/dL    BUN 18 6 - 20 MG/DL    Creatinine 0.90 0.55 - 1.02 MG/DL    BUN/Creatinine ratio 20 12 - 20      GFR est AA >60 >60 ml/min/1.73m2    GFR est non-AA >60 >60 ml/min/1.73m2    Calcium 8.0 (L) 8.5 - 10.1 MG/DL    Bilirubin, total 0.6 0.2 - 1.0 MG/DL    ALT (SGPT) 85 (H) 12 - 78 U/L    AST (SGOT) 118 (H) 15 - 37 U/L    Alk. phosphatase 116 45 - 117 U/L    Protein, total 8.7 (H) 6.4 - 8.2 g/dL    Albumin 1.8 (L) 3.5 - 5.0 g/dL    Globulin 6.9 (H) 2.0 - 4.0 g/dL    A-G Ratio 0.3 (L) 1.1 - 2.2     LACTIC ACID, PLASMA    Collection Time: 05/10/17 10:41 AM   Result Value Ref Range    Lactic acid 1.4 0.4 - 2.0 MMOL/L       No results found.

## 2017-05-10 NOTE — ROUTINE PROCESS
TRANSFER - OUT REPORT:    Verbal report given to Karen(name) on [address]  being transferred to (unit) for routine progression of care       Report consisted of patients Situation, Background, Assessment and   Recommendations(SBAR). Information from the following report(s) SBAR, ED Summary, Intake/Output, MAR and Recent Results was reviewed with the receiving nurse. Lines:   Peripheral IV 05/10/17 Left Antecubital (Active)   Site Assessment Clean, dry, & intact 5/10/2017 10:47 AM   Phlebitis Assessment 0 5/10/2017 10:47 AM   Infiltration Assessment 0 5/10/2017 10:47 AM   Dressing Status Clean, dry, & intact 5/10/2017 10:47 AM   Dressing Type Transparent 5/10/2017 10:47 AM   Hub Color/Line Status Pink;Flushed;Patent 5/10/2017 10:47 AM   Action Taken Blood drawn 5/10/2017 10:47 AM       Peripheral IV 05/10/17 Right Antecubital (Active)   Site Assessment Clean, dry, & intact 5/10/2017 10:48 AM   Phlebitis Assessment 0 5/10/2017 10:48 AM   Infiltration Assessment 0 5/10/2017 10:48 AM   Dressing Status Clean, dry, & intact 5/10/2017 10:48 AM   Dressing Type Transparent 5/10/2017 10:48 AM   Hub Color/Line Status Blue;Flushed;Patent 5/10/2017 10:48 AM   Action Taken Blood drawn 5/10/2017 10:48 AM        Opportunity for questions and clarification was provided.       Patient transported with:   [employer]

## 2017-05-10 NOTE — ED TRIAGE NOTES
Bilateral leg swelling for months, pt with dressing to right lower leg, pt stated it started to drain x 2 days, denies fever

## 2017-05-11 LAB
ALBUMIN SERPL BCP-MCNC: 1.6 G/DL (ref 3.5–5)
ALBUMIN/GLOB SERPL: 0.3 {RATIO} (ref 1.1–2.2)
ALP SERPL-CCNC: 96 U/L (ref 45–117)
ALT SERPL-CCNC: 69 U/L (ref 12–78)
ANION GAP BLD CALC-SCNC: 4 MMOL/L (ref 5–15)
AST SERPL W P-5'-P-CCNC: 88 U/L (ref 15–37)
BACTERIA SPEC CULT: ABNORMAL
BACTERIA SPEC CULT: ABNORMAL
BILIRUB SERPL-MCNC: 0.6 MG/DL (ref 0.2–1)
BUN SERPL-MCNC: 17 MG/DL (ref 6–20)
BUN/CREAT SERPL: 17 (ref 12–20)
CALCIUM SERPL-MCNC: 7.8 MG/DL (ref 8.5–10.1)
CHLORIDE SERPL-SCNC: 109 MMOL/L (ref 97–108)
CO2 SERPL-SCNC: 25 MMOL/L (ref 21–32)
CREAT SERPL-MCNC: 1.03 MG/DL (ref 0.55–1.02)
ERYTHROCYTE [DISTWIDTH] IN BLOOD BY AUTOMATED COUNT: 24.7 % (ref 11.5–14.5)
GLOBULIN SER CALC-MCNC: 6 G/DL (ref 2–4)
GLUCOSE BLD STRIP.AUTO-MCNC: 118 MG/DL (ref 65–100)
GLUCOSE BLD STRIP.AUTO-MCNC: 131 MG/DL (ref 65–100)
GLUCOSE BLD STRIP.AUTO-MCNC: 153 MG/DL (ref 65–100)
GLUCOSE BLD STRIP.AUTO-MCNC: 199 MG/DL (ref 65–100)
GLUCOSE SERPL-MCNC: 117 MG/DL (ref 65–100)
HCT VFR BLD AUTO: 28.9 % (ref 35–47)
HGB BLD-MCNC: 9.3 G/DL (ref 11.5–16)
MCH RBC QN AUTO: 27.1 PG (ref 26–34)
MCHC RBC AUTO-ENTMCNC: 32.2 G/DL (ref 30–36.5)
MCV RBC AUTO: 84.3 FL (ref 80–99)
PLATELET # BLD AUTO: 126 K/UL (ref 150–400)
POTASSIUM SERPL-SCNC: 3.6 MMOL/L (ref 3.5–5.1)
PROT SERPL-MCNC: 7.6 G/DL (ref 6.4–8.2)
RBC # BLD AUTO: 3.43 M/UL (ref 3.8–5.2)
RPR SER QL: NONREACTIVE
SERVICE CMNT-IMP: ABNORMAL
SODIUM SERPL-SCNC: 138 MMOL/L (ref 136–145)
WBC # BLD AUTO: 3.8 K/UL (ref 3.6–11)

## 2017-05-11 PROCEDURE — 74011636637 HC RX REV CODE- 636/637: Performed by: HOSPITALIST

## 2017-05-11 PROCEDURE — 86592 SYPHILIS TEST NON-TREP QUAL: CPT | Performed by: INTERNAL MEDICINE

## 2017-05-11 PROCEDURE — 36415 COLL VENOUS BLD VENIPUNCTURE: CPT | Performed by: HOSPITALIST

## 2017-05-11 PROCEDURE — 82595 ASSAY OF CRYOGLOBULIN: CPT | Performed by: INTERNAL MEDICINE

## 2017-05-11 PROCEDURE — 86361 T CELL ABSOLUTE COUNT: CPT | Performed by: INTERNAL MEDICINE

## 2017-05-11 PROCEDURE — 85027 COMPLETE CBC AUTOMATED: CPT | Performed by: HOSPITALIST

## 2017-05-11 PROCEDURE — 86780 TREPONEMA PALLIDUM: CPT | Performed by: INTERNAL MEDICINE

## 2017-05-11 PROCEDURE — 82962 GLUCOSE BLOOD TEST: CPT

## 2017-05-11 PROCEDURE — 86777 TOXOPLASMA ANTIBODY: CPT | Performed by: INTERNAL MEDICINE

## 2017-05-11 PROCEDURE — 74011250637 HC RX REV CODE- 250/637: Performed by: HOSPITALIST

## 2017-05-11 PROCEDURE — 65270000029 HC RM PRIVATE

## 2017-05-11 PROCEDURE — 86334 IMMUNOFIX E-PHORESIS SERUM: CPT | Performed by: INTERNAL MEDICINE

## 2017-05-11 PROCEDURE — 80053 COMPREHEN METABOLIC PANEL: CPT | Performed by: HOSPITALIST

## 2017-05-11 PROCEDURE — 87536 HIV-1 QUANT&REVRSE TRNSCRPJ: CPT | Performed by: INTERNAL MEDICINE

## 2017-05-11 PROCEDURE — 74011250636 HC RX REV CODE- 250/636: Performed by: HOSPITALIST

## 2017-05-11 RX ADMIN — INSULIN LISPRO 2 UNITS: 100 INJECTION, SOLUTION INTRAVENOUS; SUBCUTANEOUS at 17:13

## 2017-05-11 RX ADMIN — LEVOFLOXACIN 750 MG: 5 INJECTION, SOLUTION INTRAVENOUS at 15:09

## 2017-05-11 RX ADMIN — AMLODIPINE BESYLATE 10 MG: 5 TABLET ORAL at 10:02

## 2017-05-11 RX ADMIN — PROPRANOLOL HYDROCHLORIDE 10 MG: 10 TABLET ORAL at 10:02

## 2017-05-11 RX ADMIN — HEPARIN SODIUM 5000 UNITS: 5000 INJECTION, SOLUTION INTRAVENOUS; SUBCUTANEOUS at 07:26

## 2017-05-11 RX ADMIN — HEPARIN SODIUM 5000 UNITS: 5000 INJECTION, SOLUTION INTRAVENOUS; SUBCUTANEOUS at 15:09

## 2017-05-11 RX ADMIN — VANCOMYCIN HYDROCHLORIDE 1000 MG: 1 INJECTION, POWDER, LYOPHILIZED, FOR SOLUTION INTRAVENOUS at 12:00

## 2017-05-11 RX ADMIN — SPIRONOLACTONE 25 MG: 25 TABLET, FILM COATED ORAL at 10:02

## 2017-05-11 RX ADMIN — FERROUS SULFATE TAB 325 MG (65 MG ELEMENTAL FE) 325 MG: 325 (65 FE) TAB at 07:26

## 2017-05-11 RX ADMIN — OXYCODONE HYDROCHLORIDE AND ACETAMINOPHEN 500 MG: 500 TABLET ORAL at 10:02

## 2017-05-11 RX ADMIN — LACTULOSE 10 G: 20 SOLUTION ORAL at 10:02

## 2017-05-11 RX ADMIN — HEPARIN SODIUM 5000 UNITS: 5000 INJECTION, SOLUTION INTRAVENOUS; SUBCUTANEOUS at 22:15

## 2017-05-11 RX ADMIN — PROPRANOLOL HYDROCHLORIDE 10 MG: 10 TABLET ORAL at 17:13

## 2017-05-11 RX ADMIN — OXYCODONE HYDROCHLORIDE 10 MG: 5 TABLET ORAL at 07:26

## 2017-05-11 RX ADMIN — OXYCODONE HYDROCHLORIDE 10 MG: 5 TABLET ORAL at 21:23

## 2017-05-11 RX ADMIN — Medication 1 CAPSULE: at 07:26

## 2017-05-11 RX ADMIN — FERROUS SULFATE TAB 325 MG (65 MG ELEMENTAL FE) 325 MG: 325 (65 FE) TAB at 17:13

## 2017-05-11 RX ADMIN — Medication 10 ML: at 07:27

## 2017-05-11 RX ADMIN — Medication 10 ML: at 22:00

## 2017-05-11 RX ADMIN — OXYCODONE HYDROCHLORIDE 10 MG: 5 TABLET ORAL at 01:54

## 2017-05-11 RX ADMIN — Medication 10 ML: at 15:09

## 2017-05-11 RX ADMIN — LACTULOSE 10 G: 20 SOLUTION ORAL at 17:13

## 2017-05-11 NOTE — PROGRESS NOTES
Patient seen and examined    IMPRESSION    1. HIV    2. Hepatitis C with liver cirrhosis    3. Bilateral leg ulcers    4. DM    PLAN    1. Check cd4 count and viral load. There is no need to start HAART as an inpatient. WOuld prefer to start this as an outpatient as a test of her compliance. 2. Check toxo igg    3. The ulcers do not look grossly infected. I do not see any cellulitis or drainage. Ff up culture. Continue vanc for now. 4. Cryoglobulin test from march 2017 was negative.

## 2017-05-11 NOTE — PROGRESS NOTES
Bedside and Verbal shift change report given to Heidi Holm (oncoming nurse) by Srinivasan Singh (offgoing nurse). Report included the following information SBAR, Kardex, Intake/Output and MAR.

## 2017-05-11 NOTE — WOUND CARE
WOCN Note:     New consult for right leg wound. Chart shows:  Admitted for HIV, cellulitis of right leg; history of DM, DJD, Hep C  WBC = 3.8 with pancytopenia  Admitted from home. Assessment:   Patient is communicative, continent and mobile - able to turn herself independently for assessment. Bed: Advance  Patient reports pain in legs. All areas are present on admission. Buttocks and sacral skin intact and without erythema. Bilateral lower legs with petechial lesions of varying sizes and dried crusted lesions scattered. No open or exudative areas. Right heel clear-filled bullae ~ 2.5 x 1 x 0 cm without erythema. Left heel with skip areas of non-blanching purple with irregular margins. Also dry crater with dryschromia noted on left lateral foot. ?Kaposi Sarcoma? No area require wound care or dressings at this time. Seen with Dr. Kaiden Ulloa. Prevalon boots applied. Recommendations:    Offload heels    Discussed above plan with patient & RN.     Transition of Care: Plan to follow weekly and as needed while admitted to hospital.    ROMULO CoffmanN, RN, [facility] [place]  Certified Wound, Ostomy, Continence Nurse  office [phone]  pager [phone] or call  to page

## 2017-05-11 NOTE — PROGRESS NOTES
Problem: Discharge Planning  Goal: *Discharge to safe environment  Outcome: Progressing Towards Goal  Discharge planning

## 2017-05-11 NOTE — CONSULTS
[address]   [address]   0 [facility]       Name:  Frantz De Jesus   MR#:  [id number]   :  1955   Account #:  [de-identified]    Date of Consultation:  05/10/2017   Date of Adm:  05/10/2017       REQUESTING PHYSICIAN: Dr. John Figueroa. REASON FOR CONSULTATION: HIV. HISTORY OF PRESENT ILLNESS: The patient is a 79-year-old   woman who has HIV. She was diagnosed 20 years ago. She is   currently not on treatment. I reviewed her records. She saw   Dr. Dimitris Barrios about 4 years ago and at that time she was placed on   Atripla, but she stopped this. She did not followup with Dr. Dimitris Barrios. She saw Dr. Jennifer Naqvi in 2017 for an admission for her bilateral leg   ulcers. At that time her CD4 count was 363. It was determined that   they would start HAART as an outpatient. She also has a history of   chronic hepatitis C infection. She has had the sores on her legs for the   past 3 years. A biopsy was done in 2017 at [facility], the result of which showed nonspecific necroinflammatory   crust with scant viable dermis. AFB and GMS stains were negative for   AFB and fungal organisms. She had cultures done of these lesions   and it only showed MRSA and beta hemolytic strep. AFB cultures are   so far negative. She comes in because she says that over the past day   the sores on her right and left leg have been draining. She said that   this was purulent. She did not injure them. She did not have any fever   or chills, dyspnea, abdominal pain, diarrhea, or dysuria. She says that   her legs hurt. She came to [facility], where she was   admitted to the hospitalist service. She was started on vancomycin. A   culture of the sore was sent and this is pending. ALLERGIES:   1. CODEINE. 2. PENICILLIN. 3. POLLEN EXTRACTS. BUT IT LOOKS LIKE SHE WAS ABLE TO TOLERATE   CEFTRIAXONE IN 2017. CURRENT MEDICATIONS:   1. Norvasc.    2. Ascorbic acid.   3. Ferrous sulfate. 4. Fish oil. 5. Humalog. 6. Lactulose. 7. Levaquin. 8. Vancomycin. REVIEW OF SYSTEMS   Aside from the things mentioned in the history, the rest of review of   systems is negative. PAST MEDICAL HISTORY:   1. HIV. 2. Liver cirrhosis with hepatitis C.   3. Degenerative disk disease. 4. History of asthma. 5. Diabetes. 6. Hypertension. 7. Bilateral lower legs sores. 8. History of pulmonary nodules. SOCIAL HISTORY: She smokes 2 packs a day. She does not drink   alcohol. She does not engage in recreational drug use. PAST SURGICAL HISTORY: She has had a hysterectomy and what   seems to be a left leg surgery with implantation of hardware. PHYSICAL EXAMINATION   She is not in respiratory distress. VITAL SIGNS: Temperature is 98.6, pulse 95, blood pressure 154/85,   saturating 100% on room air. HEENT: She has pink conjunctivae, anicteric sclerae. NECK: No JVD. No cervical lymphadenopathy. External ears are   normal.   LUNGS: Clear to auscultation. No rales, wheezes or rhonchi. HEART: No murmurs, rubs, or clicks. ABDOMEN: Soft, nontender, no guarding or rebound. GENITOURINARY: No CVA tenderness. MUSCULOSKELETAL: Knees are not warm and not tender. INTEGUMENT: She is these shallow sores on both legs. There is no   surrounding cellulitis. There is no warmth. I did not see any drainage. I   did not feel any induration. PSYCHIATRIC: She is a poor historian, could have some memory   issues. NEUROLOGIC: She has spontaneous movements. Tongue midline. No facial asymmetry. LABORATORY DATA: White blood cell count is 3, creatinine 0.9. Hemoglobin 9.7, platelets 024, AST 18, ALT 85, lactic acid 1.4, G6PD   is negative.  is negative. Wound culture was gram variable   coccobacilli on Gram stain, cultures of the sore from March shows so   far negative AFB. Bacterial culture back then showed MRSA and   streptococci, fungal culture is negative.  CD4 count is 363 with a viral   load of 293,000. CT of the abdomen back then showed cirrhosis   and splenomegaly. IMPRESSION:   1. Human immunodeficiency virus. 2. Hepatitis C with liver cirrhosis. 3. Bilateral leg ulcers. 4. Diabetes. PLAN:   1. I would check a CD4 count and viral load. There is no need to start   HAART as an inpatient. Would prefer to start as an outpatient as a   test of her compliance. 2. We should check toxo. , IgG, RPR    3. The ulcers do not look grossly infected. I do not see cellulitis or drainage. We Should   followup the cultures. Continue antibiotics for now. 4. She does have some leg lesions which seem to be from   cryoglobulinemia, but her cryoglobulin test from 03/2017 was negative. Thank you for this consult.         MD ANGI Laurent / Anneliese Sheehan   D:  05/10/2017   20:47   T:  05/10/2017   21:47   Job #:  [id number]

## 2017-05-11 NOTE — PROGRESS NOTES
Care Management Interventions  PCP Verified by CM: Yes (Dr. Renetta Koo)  Palliative Care Consult (Criteria: CHF and RRAT>21): No  Reason for No Palliative Care Consult: Other (see comment) (no needs)  Mode of Transport at Discharge: Other (see comment) (family/car)  Transition of Care Consult (CM Consult): Discharge Planning  MyChart Signup: No  Discharge Durable Medical Equipment: No (ambulates with can, but has a walker at home)  Physical Therapy Consult: No  Occupational Therapy Consult: No  Speech Therapy Consult: No  Current Support Network: Relative's Home (Lives with daughter )  Confirm Follow Up Transport: Family  Plan discussed with Pt/Family/Caregiver: Yes (she was set up with [facility] back in March))  Freedom of Choice Offered: Yes  Discharge Location  Discharge Placement:  (TBd)    Chart reviewed. The patient was admitted for Infected Jj Right Lower leg ulcers. ID consult noted. The patient lives with her daughter and ambulates with a cane. CRM will follow for discharge planning needs.  CAMILO

## 2017-05-11 NOTE — PROGRESS NOTES
ID Progress Note  2017       [facility]  [facility]    Subjective:     Afebrile. No complaints. Objective:     Vitals:   Visit Vitals    /81 (BP 1 Location: Right arm, BP Patient Position: At rest)    Pulse 77    Temp 97.5 °F (36.4 °C)    Resp 16    Ht 5' 6\" (1.676 m)    Wt 64.5 kg (142 lb 3 oz)  Comment: unable to stand at present time    SpO2 100%    BMI 22.95 kg/m2        Tmax:  Temp (24hrs), Av.1 °F (36.7 °C), Min:97.5 °F (36.4 °C), Max:98.8 °F (37.1 °C)      Exam:    Eyes: pink conjunctivae   Lungs: clear to auscultation  Heart: s1, s2, no murmurs   Abdomen: soft, nontender  Extremities: no pedal edema    Labs:   Lab Results   Component Value Date/Time    WBC 3.8 2017 06:31 AM    Hemoglobin (POC) 14.1 2012 05:07 PM    HGB 9.3 2017 06:31 AM    HCT 28.9 2017 06:31 AM    PLATELET 587  06:31 AM    MCV 84.3 2017 06:31 AM     Lab Results   Component Value Date/Time    Sodium 138 2017 06:31 AM    Potassium 3.6 2017 06:31 AM    Chloride 109 2017 06:31 AM    CO2 25 2017 06:31 AM    Anion gap 4 2017 06:31 AM    Glucose 117 2017 06:31 AM    BUN 17 2017 06:31 AM    Creatinine 1.03 2017 06:31 AM    BUN/Creatinine ratio 17 2017 06:31 AM    GFR est AA >60 2017 06:31 AM    GFR est non-AA 54 2017 06:31 AM    Calcium 7.8 2017 06:31 AM    Bilirubin, total 0.6 2017 06:31 AM    AST (SGOT) 88 2017 06:31 AM    Alk. phosphatase 96 2017 06:31 AM    Protein, total 7.6 2017 06:31 AM    Albumin 1.6 2017 06:31 AM    Globulin 6.0 2017 06:31 AM    A-G Ratio 0.3 2017 06:31 AM    ALT (SGPT) 69 2017 06:31 AM           Assessment:       1. Human immunodeficiency virus. 2. Hepatitis C with liver cirrhosis. 3. Bilateral leg ulcers.    4. Diabetes.       Recommendations:       Ff up cd4 count and viral load     Ff up culture results    Cryoglobulin sent earlier, but this test was negative in March 2017    Note of concern for Kaposi. Biopsy in march 2017 did not reveal this. Let us wait for her cd4 count.      Shantal Shah MD

## 2017-05-11 NOTE — PROGRESS NOTES
Bedside and Verbal shift change report given to Kandis Gerber (oncoming nurse) by Jin Krueger (offgoing nurse). Report included the following information SBAR, Kardex, Intake/Output, MAR and Accordion.

## 2017-05-12 LAB
ACID FAST STN SPEC: NEGATIVE
ANION GAP BLD CALC-SCNC: 5 MMOL/L (ref 5–15)
BACTERIA SPEC CULT: ABNORMAL
BACTERIA SPEC CULT: ABNORMAL
BASOPHILS # BLD AUTO: 0.1 X10E3/UL (ref 0–0.2)
BASOPHILS NFR BLD AUTO: 1 %
BUN SERPL-MCNC: 20 MG/DL (ref 6–20)
BUN/CREAT SERPL: 20 (ref 12–20)
CALCIUM SERPL-MCNC: 7.8 MG/DL (ref 8.5–10.1)
CD3+CD4+ CELLS # BLD: 389 /UL (ref 359–1519)
CD3+CD4+ CELLS NFR BLD: 29.9 % (ref 30.8–58.5)
CHLORIDE SERPL-SCNC: 111 MMOL/L (ref 97–108)
CO2 SERPL-SCNC: 22 MMOL/L (ref 21–32)
CREAT SERPL-MCNC: 0.99 MG/DL (ref 0.55–1.02)
EOSINOPHIL # BLD AUTO: 0.1 X10E3/UL (ref 0–0.4)
EOSINOPHIL NFR BLD AUTO: 3 %
ERYTHROCYTE [DISTWIDTH] IN BLOOD BY AUTOMATED COUNT: 25.1 % (ref 12.3–15.4)
GLUCOSE BLD STRIP.AUTO-MCNC: 146 MG/DL (ref 65–100)
GLUCOSE BLD STRIP.AUTO-MCNC: 169 MG/DL (ref 65–100)
GLUCOSE BLD STRIP.AUTO-MCNC: 183 MG/DL (ref 65–100)
GLUCOSE BLD STRIP.AUTO-MCNC: 197 MG/DL (ref 65–100)
GLUCOSE SERPL-MCNC: 166 MG/DL (ref 65–100)
GRAM STN SPEC: ABNORMAL
HCT VFR BLD AUTO: 28.5 % (ref 34–46.6)
HGB BLD-MCNC: 9.5 G/DL (ref 11.1–15.9)
HIV1 RNA # SERPL NAA+PROBE: NORMAL COPIES/ML
HIV1 RNA SERPL NAA+PROBE-LOG#: 5.55 LOG10COPY/ML
IMM GRANULOCYTES # BLD: 0 X10E3/UL (ref 0–0.1)
IMM GRANULOCYTES NFR BLD: 0 %
LYMPHOCYTES # BLD AUTO: 1.3 X10E3/UL (ref 0.7–3.1)
LYMPHOCYTES NFR BLD AUTO: 37 %
MCH RBC QN AUTO: 27.5 PG (ref 26.6–33)
MCHC RBC AUTO-ENTMCNC: 33.3 G/DL (ref 31.5–35.7)
MCV RBC AUTO: 83 FL (ref 79–97)
MONOCYTES # BLD AUTO: 0.3 X10E3/UL (ref 0.1–0.9)
MONOCYTES NFR BLD AUTO: 10 %
MORPHOLOGY BLD-IMP: ABNORMAL
MYCOBACTERIUM SPEC QL CULT: NEGATIVE
NEUTROPHILS # BLD AUTO: 1.7 X10E3/UL (ref 1.4–7)
NEUTROPHILS NFR BLD AUTO: 49 %
PLATELET # BLD AUTO: 124 X10E3/UL (ref 150–379)
POTASSIUM SERPL-SCNC: 3.8 MMOL/L (ref 3.5–5.1)
RBC # BLD AUTO: 3.45 X10E6/UL (ref 3.77–5.28)
SERVICE CMNT-IMP: ABNORMAL
SODIUM SERPL-SCNC: 138 MMOL/L (ref 136–145)
SPECIMEN PREPARATION: NORMAL
SPECIMEN SOURCE: NORMAL
T GONDII IGG SERPL IA-ACNC: 4.2 IU/ML (ref 0–7.1)
T PALLIDUM AB SER QL IA: NEGATIVE
WBC # BLD AUTO: 3.5 X10E3/UL (ref 3.4–10.8)

## 2017-05-12 PROCEDURE — 74011636637 HC RX REV CODE- 636/637: Performed by: HOSPITALIST

## 2017-05-12 PROCEDURE — 65270000029 HC RM PRIVATE

## 2017-05-12 PROCEDURE — 74011000258 HC RX REV CODE- 258: Performed by: INTERNAL MEDICINE

## 2017-05-12 PROCEDURE — 82962 GLUCOSE BLOOD TEST: CPT

## 2017-05-12 PROCEDURE — 74011250636 HC RX REV CODE- 250/636: Performed by: HOSPITALIST

## 2017-05-12 PROCEDURE — 74011250637 HC RX REV CODE- 250/637: Performed by: HOSPITALIST

## 2017-05-12 PROCEDURE — 36415 COLL VENOUS BLD VENIPUNCTURE: CPT | Performed by: HOSPITALIST

## 2017-05-12 PROCEDURE — 80048 BASIC METABOLIC PNL TOTAL CA: CPT | Performed by: HOSPITALIST

## 2017-05-12 PROCEDURE — 74011250636 HC RX REV CODE- 250/636: Performed by: INTERNAL MEDICINE

## 2017-05-12 RX ADMIN — MEROPENEM 500 MG: 500 INJECTION, POWDER, FOR SOLUTION INTRAVENOUS at 20:17

## 2017-05-12 RX ADMIN — INSULIN LISPRO 2 UNITS: 100 INJECTION, SOLUTION INTRAVENOUS; SUBCUTANEOUS at 17:30

## 2017-05-12 RX ADMIN — OXYCODONE HYDROCHLORIDE 10 MG: 5 TABLET ORAL at 21:45

## 2017-05-12 RX ADMIN — Medication 10 ML: at 06:00

## 2017-05-12 RX ADMIN — HEPARIN SODIUM 5000 UNITS: 5000 INJECTION, SOLUTION INTRAVENOUS; SUBCUTANEOUS at 15:29

## 2017-05-12 RX ADMIN — FERROUS SULFATE TAB 325 MG (65 MG ELEMENTAL FE) 325 MG: 325 (65 FE) TAB at 10:11

## 2017-05-12 RX ADMIN — OXYCODONE HYDROCHLORIDE 10 MG: 5 TABLET ORAL at 02:18

## 2017-05-12 RX ADMIN — OXYCODONE HYDROCHLORIDE AND ACETAMINOPHEN 500 MG: 500 TABLET ORAL at 10:11

## 2017-05-12 RX ADMIN — VANCOMYCIN HYDROCHLORIDE 1000 MG: 1 INJECTION, POWDER, LYOPHILIZED, FOR SOLUTION INTRAVENOUS at 20:23

## 2017-05-12 RX ADMIN — INSULIN LISPRO 2 UNITS: 100 INJECTION, SOLUTION INTRAVENOUS; SUBCUTANEOUS at 12:31

## 2017-05-12 RX ADMIN — LEVOFLOXACIN 750 MG: 5 INJECTION, SOLUTION INTRAVENOUS at 15:28

## 2017-05-12 RX ADMIN — VANCOMYCIN HYDROCHLORIDE 1000 MG: 1 INJECTION, POWDER, LYOPHILIZED, FOR SOLUTION INTRAVENOUS at 03:00

## 2017-05-12 RX ADMIN — SPIRONOLACTONE 25 MG: 25 TABLET, FILM COATED ORAL at 10:10

## 2017-05-12 RX ADMIN — Medication 10 ML: at 15:29

## 2017-05-12 RX ADMIN — FERROUS SULFATE TAB 325 MG (65 MG ELEMENTAL FE) 325 MG: 325 (65 FE) TAB at 17:30

## 2017-05-12 RX ADMIN — OXYCODONE HYDROCHLORIDE 10 MG: 5 TABLET ORAL at 17:30

## 2017-05-12 RX ADMIN — HEPARIN SODIUM 5000 UNITS: 5000 INJECTION, SOLUTION INTRAVENOUS; SUBCUTANEOUS at 22:50

## 2017-05-12 RX ADMIN — LACTULOSE 10 G: 20 SOLUTION ORAL at 10:10

## 2017-05-12 RX ADMIN — PROPRANOLOL HYDROCHLORIDE 10 MG: 10 TABLET ORAL at 17:30

## 2017-05-12 RX ADMIN — PROPRANOLOL HYDROCHLORIDE 10 MG: 10 TABLET ORAL at 10:09

## 2017-05-12 RX ADMIN — HEPARIN SODIUM 5000 UNITS: 5000 INJECTION, SOLUTION INTRAVENOUS; SUBCUTANEOUS at 06:48

## 2017-05-12 RX ADMIN — AMLODIPINE BESYLATE 10 MG: 5 TABLET ORAL at 10:10

## 2017-05-12 RX ADMIN — LACTULOSE 10 G: 20 SOLUTION ORAL at 17:30

## 2017-05-12 RX ADMIN — OXYCODONE HYDROCHLORIDE 10 MG: 5 TABLET ORAL at 06:47

## 2017-05-12 RX ADMIN — INSULIN LISPRO 2 UNITS: 100 INJECTION, SOLUTION INTRAVENOUS; SUBCUTANEOUS at 06:48

## 2017-05-12 NOTE — PROGRESS NOTES
Hospitalist Progress Note  Ilya Yan MD  Office: [phone]  Cell: [phone]      Date of Service:  2017  NAME:  Conchita Ricardo  :  1955  MRN:  [id number]      Admission Summary:   23-year-old    American woman with past medical history significant for HIV/AIDS   who was diagnosed 20 years ago has lost to followup and not on   HAART treatment currently, last CD4 count was 363 with a high   viral load, chronic hepatitis C and liver cirrhosis, hypertension,   degenerative joint disease, degenerative disk disease, diabetes   mellitus type 2, right lower leg ulcer, bilateral lower leg wound, possible   vasculitis and anemia, who presented to [address] [place] [address] Emergency   Department with progressive pain and swelling of the right lower leg    Interval history / Subjective:     No new issues  Still having pain on movement of the legs     Assessment & Plan:     Bilateral legs erythematous blanchable lesions with wounds  -Appreciate wound care/ID input  -? Kaposi's  -Awaiting CD 4 count  -COntinue Antibiotics for now , follow culture. Blood culture no growth so far.  Wound culture staph aureus    HIV   -not on HAART    DM type 2  -On SSI  -Monitor    HTN  -stable    Liver cirrhosis   -sec to Hep C    COPD  -stable    Diabetic diet    Code status: FULL CODE  DVT prophylaxis: Heparin  PTA: HOme    Plan: Follow ID    Care Plan discussed with: Patient/Family  Disposition: TBD     Hospital Problems  Date Reviewed: 5/10/2017          Codes Class Noted POA    * (Principal)Cellulitis of right leg ICD-10-CM: L03.115  ICD-9-CM: 682.6  5/10/2017 Unknown        [facility]enia ([facility] ICD-10-CM: T18.373  ICD-9-CM: 284.19  3/25/2017 Yes        HIV (human immunodeficiency virus infection) ([facility] ICD-10-CM: Z21  ICD-9-CM: V08  2011 Yes        Diabetes mellitus type 2, controlled ([facility] ICD-10-CM: E11.9  ICD-9-CM: 250.00  2011 Yes        HTN (hypertension) ICD-10-CM: I10  ICD-9-CM: 401.9  7/21/2011 Yes                Review of Systems:   A comprehensive review of systems was negative except for that written in the HPI. Vital Signs:    Last 24hrs VS reviewed since prior progress note. Most recent are:  Visit Vitals    /69    Pulse 74    Temp 98.2 °F (36.8 °C)    Resp 16    Ht 5' 6\" (1.676 m)    Wt 64.5 kg (142 lb 3 oz)    SpO2 100%    BMI 22.95 kg/m2       No intake or output data in the 24 hours ending 05/12/17 0810     Physical Examination:             Constitutional:  No acute distress, cooperative, pleasant    ENT:  Oral mucous moist, oropharynx benign. Neck supple,    Resp:  CTA bilaterally. No wheezing/rhonchi/rales. No accessory muscle use   CV:  Regular rhythm, normal rate, no murmurs, gallops, rubs    GI:  Soft, non distended, non tender. normoactive bowel sounds, no hepatosplenomegaly     Musculoskeletal:  No edema, warm, 2+ pulses throughout    Neurologic:  Moves all extremities. AAOx3, CN II-XII reviewed     Psych:  Good insight, Not anxious nor agitated. Skin: Bilateral lower extremities swollen with erythematous circular lesions with no scaling no discharge no bleeding       Data Review:    Review and/or order of clinical lab test      Labs:     Recent Labs      05/11/17   1410  05/11/17   0631   WBC  3.5  3.8   HGB  9.5*  9.3*   HCT  28.5*  28.9*   PLT  124*  126*     Recent Labs      05/12/17   0231  05/11/17   0631  05/10/17   1041   NA  138  138  139   K  3.8  3.6  3.6   CL  111*  109*  108   CO2  22  25  24   BUN  20  17  18   CREA  0.99  1.03*  0.90   GLU  166*  117*  150*   CA  7.8*  7.8*  8.0*     Recent Labs      05/11/17   0631  05/10/17   1041   SGOT  88*  118*   ALT  69  85*   AP  96  116   TBILI  0.6  0.6   TP  7.6  8.7*   ALB  1.6*  1.8*   GLOB  6.0*  6.9*     No results for input(s): INR, PTP, APTT in the last 72 hours.     No lab exists for component: INREXT, INREXT   No results for input(s): FE, TIBC, PSAT, FERR in the last 72 hours. No results found for: FOL, RBCF   No results for input(s): PH, PCO2, PO2 in the last 72 hours. No results for input(s): CPK, CKNDX, TROIQ in the last 72 hours.     No lab exists for component: CPKMB  No results found for: CHOL, CHOLX, CHLST, CHOLV, HDL, LDL, DLDL, LDLC, DLDLP, TGL, TGLX, TRIGL, TRIGP, CHHD, CHHDX  Lab Results   Component Value Date/Time    Glucose (POC) 169 05/12/2017 06:39 AM    Glucose (POC) 153 05/11/2017 09:47 PM    Glucose (POC) 199 05/11/2017 04:14 PM    Glucose (POC) 131 05/11/2017 11:29 AM    Glucose (POC) 118 05/11/2017 06:26 AM     Lab Results   Component Value Date/Time    Color YELLOW/STRAW 03/24/2017 09:26 PM    Appearance CLEAR 03/24/2017 09:26 PM    Specific gravity 1.016 03/24/2017 09:26 PM    pH (UA) 6.0 03/24/2017 09:26 PM    Protein TRACE 03/24/2017 09:26 PM    Glucose NEGATIVE  03/24/2017 09:26 PM    Ketone NEGATIVE  03/24/2017 09:26 PM    Bilirubin NEGATIVE  03/24/2017 09:26 PM    Urobilinogen 1.0 03/24/2017 09:26 PM    Nitrites NEGATIVE  03/24/2017 09:26 PM    Leukocyte Esterase MODERATE 03/24/2017 09:26 PM    Epithelial cells FEW 03/24/2017 09:26 PM    Bacteria NEGATIVE  03/24/2017 09:26 PM    WBC 10-20 03/24/2017 09:26 PM    RBC 0-5 03/24/2017 09:26 PM         Medications Reviewed:     Current Facility-Administered Medications   Medication Dose Route Frequency    ascorbic acid (vitamin C) (VITAMIN C) tablet 500 mg  500 mg Oral DAILY    ferrous sulfate tablet 325 mg  325 mg Oral BID WITH MEALS    fish oil-omega-3 fatty acids 340-1,000 mg capsule 1 Cap  1 Cap Oral EVERY OTHER DAY    oxyCODONE IR (ROXICODONE) tablet 10 mg  10 mg Oral Q4H PRN    propranolol (INDERAL) tablet 10 mg  10 mg Oral BID    sodium chloride (NS) flush 5-10 mL  5-10 mL IntraVENous Q8H    sodium chloride (NS) flush 5-10 mL  5-10 mL IntraVENous PRN    heparin (porcine) injection 5,000 Units  5,000 Units SubCUTAneous Q8H    levoFLOXacin (LEVAQUIN) 750 mg in D5W IVPB 750 mg IntraVENous Q24H    insulin lispro (HUMALOG) injection   SubCUTAneous AC&HS    glucose chewable tablet 16 g  4 Tab Oral PRN    dextrose (D50W) injection syrg 12.5-25 g  12.5-25 g IntraVENous PRN    glucagon (GLUCAGEN) injection 1 mg  1 mg IntraMUSCular PRN    albuterol-ipratropium (DUO-NEB) 2.5 MG-0.5 MG/3 ML  3 mL Nebulization Q4H PRN    Vancomycin- pharmacy to dose   Other Rx Dosing/Monitoring    vancomycin (VANCOCIN) 1,000 mg in 0.9% sodium chloride (MBP/ADV) 250 mL  1,000 mg IntraVENous Q16H    spironolactone (ALDACTONE) tablet 25 mg  25 mg Oral DAILY    amLODIPine (NORVASC) tablet 10 mg  10 mg Oral DAILY    lactulose (CHRONULAC) solution 10 g  10 g Oral BID     ______________________________________________________________________  EXPECTED LENGTH OF STAY: 4d 7h  ACTUAL LENGTH OF STAY:          2                 Tessa De Jesus MD

## 2017-05-12 NOTE — PROGRESS NOTES
Hospitalist Progress Note  Ilya Yan MD  Office: [phone]  Cell: [phone]      Date of Service:  2017  NAME:  Conchita Ricardo  :  1955  MRN:  [id number]      Admission Summary:   68-year-old    American woman with past medical history significant for HIV/AIDS   who was diagnosed 20 years ago has lost to followup and not on   HAART treatment currently, last CD4 count was 363 with a high   viral load, chronic hepatitis C and liver cirrhosis, hypertension,   degenerative joint disease, degenerative disk disease, diabetes   mellitus type 2, right lower leg ulcer, bilateral lower leg wound, possible   vasculitis and anemia, who presented to [facility] Emergency   Department with progressive pain and swelling of the right lower leg    Interval history / Subjective:     No new issues  Still having pain on movement of the legs     Assessment & Plan:     Bilateral legs erythematous blanchable lesions with wounds  -Appreciate wound care/ID input  -? Kaposi's  -Awaiting CD 4 count  -COntinue Antibiotics for now , follow culture    HIV   -not on HAART    DM type 2  -On SSI  -Monitor    HTN  -stable    Liver cirrhosis   -sec to Hep C    COPD  -stable    Diabetic diet    Code status: FULL CODE  DVT prophylaxis: Heparin  PTA: HOme    Plan: Follow ID    Care Plan discussed with: Patient/Family  Disposition: TBD     Hospital Problems  Date Reviewed: 5/10/2017          Codes Class Noted POA    * (Principal)Cellulitis of right leg ICD-10-CM: L03.115  ICD-9-CM: 682.6  5/10/2017 Unknown        Pancytopenia ([facility] ICD-10-CM: U32.340  ICD-9-CM: 284.19  3/25/2017 Yes        HIV (human immunodeficiency virus infection) ([facility] ICD-10-CM: Z21  ICD-9-CM: V08  2011 Yes        Diabetes mellitus type 2, controlled ([facility] ICD-10-CM: E11.9  ICD-9-CM: 250.00  2011 Yes        HTN (hypertension) ICD-10-CM: I10  ICD-9-CM: 401.9  2011 Yes Review of Systems:   A comprehensive review of systems was negative except for that written in the HPI. Vital Signs:    Last 24hrs VS reviewed since prior progress note. Most recent are:  Visit Vitals    /69    Pulse 74    Temp 98.2 °F (36.8 °C)    Resp 16    Ht 5' 6\" (1.676 m)    Wt 64.5 kg (142 lb 3 oz)    SpO2 100%    BMI 22.95 kg/m2         Intake/Output Summary (Last 24 hours) at 05/12/17 0554  Last data filed at 05/11/17 0805   Gross per 24 hour   Intake              240 ml   Output                0 ml   Net              240 ml        Physical Examination:             Constitutional:  No acute distress, cooperative, pleasant    ENT:  Oral mucous moist, oropharynx benign. Neck supple,    Resp:  CTA bilaterally. No wheezing/rhonchi/rales. No accessory muscle use   CV:  Regular rhythm, normal rate, no murmurs, gallops, rubs    GI:  Soft, non distended, non tender. normoactive bowel sounds, no hepatosplenomegaly     Musculoskeletal:  No edema, warm, 2+ pulses throughout    Neurologic:  Moves all extremities. AAOx3, CN II-XII reviewed     Psych:  Good insight, Not anxious nor agitated. Data Review:    Review and/or order of clinical lab test      Labs:     Recent Labs      05/11/17   0631  05/10/17   1041   WBC  3.8  3.0*   HGB  9.3*  9.7*   HCT  28.9*  29.8*   PLT  126*  131*     Recent Labs      05/12/17   0231  05/11/17   0631  05/10/17   1041   NA  138  138  139   K  3.8  3.6  3.6   CL  111*  109*  108   CO2  22  25  24   BUN  20  17  18   CREA  0.99  1.03*  0.90   GLU  166*  117*  150*   CA  7.8*  7.8*  8.0*     Recent Labs      05/11/17   0631  05/10/17   1041   SGOT  88*  118*   ALT  69  85*   AP  96  116   TBILI  0.6  0.6   TP  7.6  8.7*   ALB  1.6*  1.8*   GLOB  6.0*  6.9*     No results for input(s): INR, PTP, APTT in the last 72 hours. No lab exists for component: INREXT   No results for input(s): FE, TIBC, PSAT, FERR in the last 72 hours.    No results found for: FOL, RBCF   No results for input(s): PH, PCO2, PO2 in the last 72 hours. No results for input(s): CPK, CKNDX, TROIQ in the last 72 hours.     No lab exists for component: CPKMB  No results found for: CHOL, CHOLX, CHLST, CHOLV, HDL, LDL, DLDL, LDLC, DLDLP, TGL, TGLX, TRIGL, TRIGP, CHHD, CHHDX  Lab Results   Component Value Date/Time    Glucose (POC) 153 05/11/2017 09:47 PM    Glucose (POC) 199 05/11/2017 04:14 PM    Glucose (POC) 131 05/11/2017 11:29 AM    Glucose (POC) 118 05/11/2017 06:26 AM    Glucose (POC) 116 05/10/2017 09:14 PM     Lab Results   Component Value Date/Time    Color YELLOW/STRAW 03/24/2017 09:26 PM    Appearance CLEAR 03/24/2017 09:26 PM    Specific gravity 1.016 03/24/2017 09:26 PM    pH (UA) 6.0 03/24/2017 09:26 PM    Protein TRACE 03/24/2017 09:26 PM    Glucose NEGATIVE  03/24/2017 09:26 PM    Ketone NEGATIVE  03/24/2017 09:26 PM    Bilirubin NEGATIVE  03/24/2017 09:26 PM    Urobilinogen 1.0 03/24/2017 09:26 PM    Nitrites NEGATIVE  03/24/2017 09:26 PM    Leukocyte Esterase MODERATE 03/24/2017 09:26 PM    Epithelial cells FEW 03/24/2017 09:26 PM    Bacteria NEGATIVE  03/24/2017 09:26 PM    WBC 10-20 03/24/2017 09:26 PM    RBC 0-5 03/24/2017 09:26 PM         Medications Reviewed:     Current Facility-Administered Medications   Medication Dose Route Frequency    ascorbic acid (vitamin C) (VITAMIN C) tablet 500 mg  500 mg Oral DAILY    ferrous sulfate tablet 325 mg  325 mg Oral BID WITH MEALS    fish oil-omega-3 fatty acids 340-1,000 mg capsule 1 Cap  1 Cap Oral EVERY OTHER DAY    oxyCODONE IR (ROXICODONE) tablet 10 mg  10 mg Oral Q4H PRN    propranolol (INDERAL) tablet 10 mg  10 mg Oral BID    sodium chloride (NS) flush 5-10 mL  5-10 mL IntraVENous Q8H    sodium chloride (NS) flush 5-10 mL  5-10 mL IntraVENous PRN    heparin (porcine) injection 5,000 Units  5,000 Units SubCUTAneous Q8H    levoFLOXacin (LEVAQUIN) 750 mg in D5W IVPB  750 mg IntraVENous Q24H    insulin lispro (HUMALOG) injection   SubCUTAneous AC&HS    glucose chewable tablet 16 g  4 Tab Oral PRN    dextrose (D50W) injection syrg 12.5-25 g  12.5-25 g IntraVENous PRN    glucagon (GLUCAGEN) injection 1 mg  1 mg IntraMUSCular PRN    albuterol-ipratropium (DUO-NEB) 2.5 MG-0.5 MG/3 ML  3 mL Nebulization Q4H PRN    Vancomycin- pharmacy to dose   Other Rx Dosing/Monitoring    vancomycin (VANCOCIN) 1,000 mg in 0.9% sodium chloride (MBP/ADV) 250 mL  1,000 mg IntraVENous Q16H    spironolactone (ALDACTONE) tablet 25 mg  25 mg Oral DAILY    amLODIPine (NORVASC) tablet 10 mg  10 mg Oral DAILY    lactulose (CHRONULAC) solution 10 g  10 g Oral BID     ______________________________________________________________________  EXPECTED LENGTH OF STAY: 4d 7h  ACTUAL LENGTH OF STAY:          2                 Yenifer Martinez MD

## 2017-05-12 NOTE — PROGRESS NOTES
Bedside shift change report given to Rm Connors RN (oncoming nurse) by Yvette Cantrell RN (offgoing nurse). Report included the following information SBAR, Kardex and Recent Results.

## 2017-05-12 NOTE — PROGRESS NOTES
ID Progress Note  2017       vanco  levaquin    Subjective:     Afebrile. No complaints. Objective:     Vitals:   Visit Vitals    /83 (BP 1 Location: Left arm, BP Patient Position: At rest)    Pulse 77    Temp 97.7 °F (36.5 °C)    Resp 14    Ht 5' 6\" (1.676 m)    Wt 64.5 kg (142 lb 3 oz)  Comment: unable to stand at present time    SpO2 99%    BMI 22.95 kg/m2        Tmax:  Temp (24hrs), Av.1 °F (36.7 °C), Min:97.7 °F (36.5 °C), Max:98.6 °F (37 °C)      Exam:    Not in distress  Lungs: clear to auscultation  Heart: s1, s2, no murmurs   Abdomen: soft, nontender      Labs:   Lab Results   Component Value Date/Time    WBC 3.5 2017 02:10 PM    Hemoglobin (POC) 14.1 2012 05:07 PM    HGB 9.5 2017 02:10 PM    HCT 28.5 2017 02:10 PM    PLATELET 331  02:10 PM    MCV 83 2017 02:10 PM     Lab Results   Component Value Date/Time    Sodium 138 2017 02:31 AM    Potassium 3.8 2017 02:31 AM    Chloride 111 2017 02:31 AM    CO2 22 2017 02:31 AM    Anion gap 5 2017 02:31 AM    Glucose 166 2017 02:31 AM    BUN 20 2017 02:31 AM    Creatinine 0.99 2017 02:31 AM    BUN/Creatinine ratio 20 2017 02:31 AM    GFR est AA >60 2017 02:31 AM    GFR est non-AA 57 2017 02:31 AM    Calcium 7.8 2017 02:31 AM    Bilirubin, total 0.6 2017 06:31 AM    AST (SGOT) 88 2017 06:31 AM    Alk. phosphatase 96 2017 06:31 AM    Protein, total 7.6 2017 06:31 AM    Albumin 1.6 2017 06:31 AM    Globulin 6.0 2017 06:31 AM    A-G Ratio 0.3 2017 06:31 AM    ALT (SGPT) 69 2017 06:31 AM           Assessment:       1. Human immunodeficiency virus. 2. Hepatitis C with liver cirrhosis. 3. Bilateral leg ulcers. 4. Diabetes.       Recommendations:     cd4 is 389 with viral load of 359,000. She does not need prophylaxis for OIs.  She will need treatment but this should started as an outpatient as a test for her compliance. Cryoglobulin sent earlier, but this test was negative in March 2017    Note of concern for Kaposi. Biopsy in march 2017 did not reveal this. Her current cd4 count also makes this less likely. Would give merrem for esbl. She tolerated ceftriaxone in the past.       Team available over the weekend if needed.      Brionna Garay MD

## 2017-05-13 LAB
ACID FAST STN SPEC: NEGATIVE
ANION GAP BLD CALC-SCNC: 10 MMOL/L (ref 5–15)
BUN SERPL-MCNC: 17 MG/DL (ref 6–20)
BUN/CREAT SERPL: 20 (ref 12–20)
CALCIUM SERPL-MCNC: 7.7 MG/DL (ref 8.5–10.1)
CHLORIDE SERPL-SCNC: 109 MMOL/L (ref 97–108)
CO2 SERPL-SCNC: 20 MMOL/L (ref 21–32)
CREAT SERPL-MCNC: 0.87 MG/DL (ref 0.55–1.02)
DATE LAST DOSE: NORMAL
ERYTHROCYTE [DISTWIDTH] IN BLOOD BY AUTOMATED COUNT: 23.8 % (ref 11.5–14.5)
GLUCOSE BLD STRIP.AUTO-MCNC: 134 MG/DL (ref 65–100)
GLUCOSE BLD STRIP.AUTO-MCNC: 148 MG/DL (ref 65–100)
GLUCOSE BLD STRIP.AUTO-MCNC: 176 MG/DL (ref 65–100)
GLUCOSE BLD STRIP.AUTO-MCNC: 213 MG/DL (ref 65–100)
GLUCOSE SERPL-MCNC: 166 MG/DL (ref 65–100)
HCT VFR BLD AUTO: 26.7 % (ref 35–47)
HGB BLD-MCNC: 8.7 G/DL (ref 11.5–16)
MCH RBC QN AUTO: 27.2 PG (ref 26–34)
MCHC RBC AUTO-ENTMCNC: 32.6 G/DL (ref 30–36.5)
MCV RBC AUTO: 83.4 FL (ref 80–99)
MYCOBACTERIUM SPEC QL CULT: NEGATIVE
PLATELET # BLD AUTO: 126 K/UL (ref 150–400)
POTASSIUM SERPL-SCNC: 3.8 MMOL/L (ref 3.5–5.1)
RBC # BLD AUTO: 3.2 M/UL (ref 3.8–5.2)
REPORTED DOSE,DOSE: NORMAL UNITS
REPORTED DOSE/TIME,TMG: NORMAL
SERVICE CMNT-IMP: ABNORMAL
SODIUM SERPL-SCNC: 139 MMOL/L (ref 136–145)
SPECIMEN PREPARATION: NORMAL
SPECIMEN SOURCE: NORMAL
VANCOMYCIN TROUGH SERPL-MCNC: 6.3 UG/ML (ref 5–10)
WBC # BLD AUTO: 3.4 K/UL (ref 3.6–11)

## 2017-05-13 PROCEDURE — 80202 ASSAY OF VANCOMYCIN: CPT | Performed by: HOSPITALIST

## 2017-05-13 PROCEDURE — 85027 COMPLETE CBC AUTOMATED: CPT | Performed by: HOSPITALIST

## 2017-05-13 PROCEDURE — 36415 COLL VENOUS BLD VENIPUNCTURE: CPT | Performed by: HOSPITALIST

## 2017-05-13 PROCEDURE — 80048 BASIC METABOLIC PNL TOTAL CA: CPT | Performed by: HOSPITALIST

## 2017-05-13 PROCEDURE — 74011000258 HC RX REV CODE- 258: Performed by: INTERNAL MEDICINE

## 2017-05-13 PROCEDURE — 82962 GLUCOSE BLOOD TEST: CPT

## 2017-05-13 PROCEDURE — 74011250636 HC RX REV CODE- 250/636: Performed by: HOSPITALIST

## 2017-05-13 PROCEDURE — 74011250637 HC RX REV CODE- 250/637: Performed by: INTERNAL MEDICINE

## 2017-05-13 PROCEDURE — 74011250636 HC RX REV CODE- 250/636: Performed by: INTERNAL MEDICINE

## 2017-05-13 PROCEDURE — 65270000029 HC RM PRIVATE

## 2017-05-13 PROCEDURE — 74011636637 HC RX REV CODE- 636/637: Performed by: HOSPITALIST

## 2017-05-13 PROCEDURE — 74011250637 HC RX REV CODE- 250/637: Performed by: HOSPITALIST

## 2017-05-13 RX ORDER — VANCOMYCIN HYDROCHLORIDE
1250 EVERY 12 HOURS
Status: DISCONTINUED | OUTPATIENT
Start: 2017-05-13 | End: 2017-05-14 | Stop reason: ALTCHOICE

## 2017-05-13 RX ORDER — LANOLIN ALCOHOL/MO/W.PET/CERES
325 CREAM (GRAM) TOPICAL 2 TIMES DAILY WITH MEALS
Status: DISCONTINUED | OUTPATIENT
Start: 2017-05-13 | End: 2017-05-19 | Stop reason: HOSPADM

## 2017-05-13 RX ADMIN — MEROPENEM 500 MG: 500 INJECTION, POWDER, FOR SOLUTION INTRAVENOUS at 14:05

## 2017-05-13 RX ADMIN — Medication 1 CAPSULE: at 07:00

## 2017-05-13 RX ADMIN — PROPRANOLOL HYDROCHLORIDE 10 MG: 10 TABLET ORAL at 17:09

## 2017-05-13 RX ADMIN — LACTULOSE 10 G: 20 SOLUTION ORAL at 09:28

## 2017-05-13 RX ADMIN — VANCOMYCIN HYDROCHLORIDE 1000 MG: 1 INJECTION, POWDER, LYOPHILIZED, FOR SOLUTION INTRAVENOUS at 10:46

## 2017-05-13 RX ADMIN — INSULIN LISPRO 2 UNITS: 100 INJECTION, SOLUTION INTRAVENOUS; SUBCUTANEOUS at 17:09

## 2017-05-13 RX ADMIN — HEPARIN SODIUM 5000 UNITS: 5000 INJECTION, SOLUTION INTRAVENOUS; SUBCUTANEOUS at 06:59

## 2017-05-13 RX ADMIN — PROPRANOLOL HYDROCHLORIDE 10 MG: 10 TABLET ORAL at 09:28

## 2017-05-13 RX ADMIN — MEROPENEM 500 MG: 500 INJECTION, POWDER, FOR SOLUTION INTRAVENOUS at 02:25

## 2017-05-13 RX ADMIN — MEROPENEM 500 MG: 500 INJECTION, POWDER, FOR SOLUTION INTRAVENOUS at 21:19

## 2017-05-13 RX ADMIN — Medication 325 MG: at 09:28

## 2017-05-13 RX ADMIN — AMLODIPINE BESYLATE 10 MG: 5 TABLET ORAL at 09:28

## 2017-05-13 RX ADMIN — Medication 10 ML: at 21:53

## 2017-05-13 RX ADMIN — VANCOMYCIN HYDROCHLORIDE 1250 MG: 10 INJECTION, POWDER, LYOPHILIZED, FOR SOLUTION INTRAVENOUS at 22:30

## 2017-05-13 RX ADMIN — OXYCODONE HYDROCHLORIDE 10 MG: 5 TABLET ORAL at 04:30

## 2017-05-13 RX ADMIN — HEPARIN SODIUM 5000 UNITS: 5000 INJECTION, SOLUTION INTRAVENOUS; SUBCUTANEOUS at 14:05

## 2017-05-13 RX ADMIN — Medication 325 MG: at 17:09

## 2017-05-13 RX ADMIN — HEPARIN SODIUM 5000 UNITS: 5000 INJECTION, SOLUTION INTRAVENOUS; SUBCUTANEOUS at 22:57

## 2017-05-13 RX ADMIN — LACTULOSE 10 G: 20 SOLUTION ORAL at 17:09

## 2017-05-13 RX ADMIN — OXYCODONE HYDROCHLORIDE AND ACETAMINOPHEN 500 MG: 500 TABLET ORAL at 09:28

## 2017-05-13 RX ADMIN — MEROPENEM 500 MG: 500 INJECTION, POWDER, FOR SOLUTION INTRAVENOUS at 09:28

## 2017-05-13 RX ADMIN — INSULIN LISPRO 3 UNITS: 100 INJECTION, SOLUTION INTRAVENOUS; SUBCUTANEOUS at 11:36

## 2017-05-13 RX ADMIN — SPIRONOLACTONE 25 MG: 25 TABLET, FILM COATED ORAL at 09:28

## 2017-05-13 RX ADMIN — OXYCODONE HYDROCHLORIDE 10 MG: 5 TABLET ORAL at 18:05

## 2017-05-13 NOTE — PROGRESS NOTES
Bedside and Verbal shift change report given to Fiona Zurita (oncoming nurse) by Janene Esteban (offgoing nurse). Report included the following information SBAR, Kardex, Intake/Output, MAR and Recent Results.

## 2017-05-13 NOTE — PROGRESS NOTES
Bedside and Verbal shift change report given to Treva Jasso RN (oncoming nurse) by [facility], RN (offgoing nurse). Report included the following information SBAR, Kardex, Procedure Summary, Intake/Output, MAR and Recent Results.

## 2017-05-13 NOTE — PROGRESS NOTES
Received in Bedside report from [address] [place] [address] that IV infiltrated at administration of Antibiotics. Off going Nurse also stated she notified Nursing supervisor to place another IV. 22 G placed in R Forearm at 0200. IV Merropenem administered at 0227. Will notify AM nurse to adjust administration time as Merepenem is due in MAR at 0700, however is to be administered Q 6 hours and needs to be administered at 0830.

## 2017-05-13 NOTE — PROGRESS NOTES
Patient's IV infiiltrated @ 1945. Two nurses tried to start IV without success. Nursing supervisor has been called to come attempt an IV. As a result, 1900 antibiotics, Vancomycin and Merrem, have not been administered.

## 2017-05-13 NOTE — PROGRESS NOTES
Hospitalist Progress Note  Francie Sheridan MD  Office: [phone]  Cell: [phone]      Date of Service:  2017  NAME:  Conchita Ricardo  :  1955  MRN:  [id number]      Admission Summary:   69-year-old    American woman with past medical history significant for HIV/AIDS   who was diagnosed 20 years ago has lost to followup and not on   HAART treatment currently, last CD4 count was 363 with a high   viral load, chronic hepatitis C and liver cirrhosis, hypertension,   degenerative joint disease, degenerative disk disease, diabetes   mellitus type 2, right lower leg ulcer, bilateral lower leg wound, possible   vasculitis and anemia, who presented to [facility] Emergency   Department with progressive pain and swelling of the right lower leg    Interval history / Subjective:   Still uncomfortable but feels better compared to yesterday     Assessment & Plan:     Bilateral legs erythematous blanchable lesions with wounds  -Appreciate wound care/ID input  -abx as per ID recommending merropenem to cover esbl    HIV   -not on HAART    DM type 2  -On SSI  -Monitor    HTN  -stable    Liver cirrhosis   -sec to Hep C    COPD  -stable    Diabetic diet    Code status: FULL CODE  DVT prophylaxis: Heparin  PTA: HOme    Plan: Follow up with ID        Hospital Problems  Date Reviewed: 5/10/2017          Codes Class Noted POA    * (Principal)Cellulitis of right leg ICD-10-CM: L03.115  ICD-9-CM: 682.6  5/10/2017 Unknown        [facility] ([facility] ICD-10-CM: E28.766  ICD-9-CM: 284.19  3/25/2017 Yes        HIV (human immunodeficiency virus infection) ([facility] ICD-10-CM: Z21  ICD-9-CM: Diantha Job  2011 Yes        Diabetes mellitus type 2, controlled ([facility] ICD-10-CM: E11.9  ICD-9-CM: 250.00  2011 Yes        HTN (hypertension) ICD-10-CM: I10  ICD-9-CM: 401.9  2011 Yes                  10 point ROS,pertinent positive noted in HPI and above        Vital Signs:    Last 24hrs VS reviewed since prior progress note. Most recent are:  Visit Vitals    /82    Pulse 75    Temp 98 °F (36.7 °C)    Resp 14    Ht 5' 6\" (1.676 m)    Wt 64.5 kg (142 lb 3 oz)    SpO2 99%    BMI 22.95 kg/m2           Physical Examination:             Constitutional:  No acute distress, cooperative, pleasant    ENT:  Oral mucous moist, oropharynx benign. Neck supple,    Resp:  CTA bilaterally. No wheezing/rhonchi/rales. No accessory muscle use   CV:  Regular rhythm, normal rate, no murmurs, gallops, rubs    GI:  Soft, non distended, non tender. normoactive bowel sounds, no hepatosplenomegaly     Musculoskeletal:  No edema, warm, 2+ pulses throughout    Neurologic:  Moves all extremities. AAOx3, CN II-XII reviewed     Psych:  Good insight, Not anxious nor agitated.    Skin: Bilateral lower extremities swollen with erythematous circular lesions with no scaling no discharge no bleeding       Labs:     Recent Labs      05/11/17   1410  05/11/17   0631   WBC  3.5  3.8   HGB  9.5*  9.3*   HCT  28.5*  28.9*   PLT  124*  126*     Recent Labs      05/12/17   0231  05/11/17   0631  05/10/17   1041   NA  138  138  139   K  3.8  3.6  3.6   CL  111*  109*  108   CO2  22  25  24   BUN  20  17  18   CREA  0.99  1.03*  0.90   GLU  166*  117*  150*   CA  7.8*  7.8*  8.0*     Recent Labs      05/11/17   0631  05/10/17   1041   SGOT  88*  118*   ALT  69  85*   AP  96  116   TBILI  0.6  0.6   TP  7.6  8.7*   ALB  1.6*  1.8*   GLOB  6.0*  6.9*     -  Lab Results   Component Value Date/Time    Glucose (POC) 134 05/13/2017 06:50 AM    Glucose (POC) 146 05/12/2017 09:21 PM    Glucose (POC) 197 05/12/2017 04:34 PM    Glucose (POC) 183 05/12/2017 12:03 PM    Glucose (POC) 169 05/12/2017 06:39 AM                      Matthew Richard MD

## 2017-05-13 NOTE — PROGRESS NOTES
Day #4 of Vancomycin  Indication: bilateral leg ulcers  Current regimen:  1000 mg IV q16h  Abx regimen:  Vancomycin + Meropenem    Recent Labs      17   1000  17   0231  17   1410  17   0631   WBC  3.4*   --   3.5  3.8   CREA  0.87  0.99   --   1.03*   BUN  17  20   --   17     Est CrCl: ~60-65 ml/min  Temp (24hrs), Av.2 °F (36.8 °C), Min:97.7 °F (36.5 °C), Max:98.5 °F (36.9 °C)    Cultures:   5/10 blood - NGTD  5/10 leg wound - light MRSA + light ESBL+ E.coli, final  5/10 MRSA screen - positive     Goal trough = 10 - 15 mcg/mL    Recent trough history:   @1000 = 6.3 mcg/mL    Plan: Level is below goal. Will adjust to 1250 mg IV q12h, dosing per P&T approved protocol. Yrn Hull, Pharm. D., BCPS

## 2017-05-13 NOTE — PROGRESS NOTES
Bedside and Verbal shift change report given to Yolanda Craft RN (oncoming nurse) by Mali Pimentel RN (offgoing nurse). Report included the following information SBAR and Kardex.

## 2017-05-13 NOTE — PROGRESS NOTES
Notified Pharmacy via message to retime Merepenem related to last dose given at 0227 and retime Iron as it is due with meals and pat still hadn't received breakfast.

## 2017-05-13 NOTE — PROGRESS NOTES
0630 Notified Respiratory Therapy of Arterial stick order. [id number] called Respiratory Therapy to follow up due to no lab results, No answer, Will try again. [id number] called Respiratory Therapy to Follow up, no answer. Will call  to confirm correct number.  gave Respiratory Supervisor number. Respiratory Supervisor gave correct number. Spoke with Respiratory Therapy about Arterial stick. Respiratory Therapy to obtain, will follow up.

## 2017-05-14 LAB
ANION GAP BLD CALC-SCNC: 8 MMOL/L (ref 5–15)
BASOPHILS # BLD AUTO: 0.1 K/UL (ref 0–0.1)
BASOPHILS # BLD: 2 % (ref 0–1)
BUN SERPL-MCNC: 16 MG/DL (ref 6–20)
BUN/CREAT SERPL: 18 (ref 12–20)
CALCIUM SERPL-MCNC: 8.2 MG/DL (ref 8.5–10.1)
CHLORIDE SERPL-SCNC: 109 MMOL/L (ref 97–108)
CO2 SERPL-SCNC: 18 MMOL/L (ref 21–32)
CREAT SERPL-MCNC: 0.87 MG/DL (ref 0.55–1.02)
EOSINOPHIL # BLD: 0.1 K/UL (ref 0–0.4)
EOSINOPHIL NFR BLD: 3 % (ref 0–7)
ERYTHROCYTE [DISTWIDTH] IN BLOOD BY AUTOMATED COUNT: 24.7 % (ref 11.5–14.5)
GLUCOSE BLD STRIP.AUTO-MCNC: 110 MG/DL (ref 65–100)
GLUCOSE BLD STRIP.AUTO-MCNC: 155 MG/DL (ref 65–100)
GLUCOSE BLD STRIP.AUTO-MCNC: 213 MG/DL (ref 65–100)
GLUCOSE BLD STRIP.AUTO-MCNC: 220 MG/DL (ref 65–100)
GLUCOSE SERPL-MCNC: 125 MG/DL (ref 65–100)
HCT VFR BLD AUTO: 31.2 % (ref 35–47)
HGB BLD-MCNC: 10.1 G/DL (ref 11.5–16)
LYMPHOCYTES # BLD AUTO: 46 % (ref 12–49)
LYMPHOCYTES # BLD: 1.8 K/UL (ref 0.8–3.5)
MAGNESIUM SERPL-MCNC: 1.7 MG/DL (ref 1.6–2.4)
MCH RBC QN AUTO: 27.5 PG (ref 26–34)
MCHC RBC AUTO-ENTMCNC: 32.4 G/DL (ref 30–36.5)
MCV RBC AUTO: 85 FL (ref 80–99)
MONOCYTES # BLD: 0.4 K/UL (ref 0–1)
MONOCYTES NFR BLD AUTO: 11 % (ref 5–13)
NEUTS SEG # BLD: 1.5 K/UL (ref 1.8–8)
NEUTS SEG NFR BLD AUTO: 38 % (ref 32–75)
NRBC # BLD: 0.04 K/UL (ref 0–0.01)
NRBC BLD-RTO: 1.1 PER 100 WBC
PLATELET # BLD AUTO: 132 K/UL (ref 150–400)
POTASSIUM SERPL-SCNC: 4.3 MMOL/L (ref 3.5–5.1)
RBC # BLD AUTO: 3.67 M/UL (ref 3.8–5.2)
RBC MORPH BLD: ABNORMAL
RBC MORPH BLD: ABNORMAL
SERVICE CMNT-IMP: ABNORMAL
SODIUM SERPL-SCNC: 135 MMOL/L (ref 136–145)
WBC # BLD AUTO: 3.9 K/UL (ref 3.6–11)
WBC NRBC COR # BLD: ABNORMAL 10*3/UL

## 2017-05-14 PROCEDURE — 36415 COLL VENOUS BLD VENIPUNCTURE: CPT | Performed by: INTERNAL MEDICINE

## 2017-05-14 PROCEDURE — 74011250637 HC RX REV CODE- 250/637: Performed by: INTERNAL MEDICINE

## 2017-05-14 PROCEDURE — 82962 GLUCOSE BLOOD TEST: CPT

## 2017-05-14 PROCEDURE — 76937 US GUIDE VASCULAR ACCESS: CPT

## 2017-05-14 PROCEDURE — 74011000258 HC RX REV CODE- 258: Performed by: INTERNAL MEDICINE

## 2017-05-14 PROCEDURE — C1751 CATH, INF, PER/CENT/MIDLINE: HCPCS

## 2017-05-14 PROCEDURE — 80048 BASIC METABOLIC PNL TOTAL CA: CPT | Performed by: INTERNAL MEDICINE

## 2017-05-14 PROCEDURE — 65270000029 HC RM PRIVATE

## 2017-05-14 PROCEDURE — 74011250637 HC RX REV CODE- 250/637: Performed by: HOSPITALIST

## 2017-05-14 PROCEDURE — 77030018719 HC DRSG PTCH ANTIMIC J&J -A

## 2017-05-14 PROCEDURE — 74011250636 HC RX REV CODE- 250/636: Performed by: HOSPITALIST

## 2017-05-14 PROCEDURE — 77030020847 HC STATLOK BARD -A

## 2017-05-14 PROCEDURE — 74011250636 HC RX REV CODE- 250/636: Performed by: INTERNAL MEDICINE

## 2017-05-14 PROCEDURE — 77030020365 HC SOL INJ SOD CL 0.9% 50ML

## 2017-05-14 PROCEDURE — 85025 COMPLETE CBC W/AUTO DIFF WBC: CPT | Performed by: INTERNAL MEDICINE

## 2017-05-14 PROCEDURE — 74011636637 HC RX REV CODE- 636/637: Performed by: HOSPITALIST

## 2017-05-14 PROCEDURE — 83735 ASSAY OF MAGNESIUM: CPT | Performed by: INTERNAL MEDICINE

## 2017-05-14 PROCEDURE — 36569 INSJ PICC 5 YR+ W/O IMAGING: CPT | Performed by: INTERNAL MEDICINE

## 2017-05-14 RX ORDER — LEVOFLOXACIN 250 MG/1
500 TABLET ORAL EVERY 24 HOURS
Status: DISCONTINUED | OUTPATIENT
Start: 2017-05-14 | End: 2017-05-15

## 2017-05-14 RX ORDER — LINEZOLID 600 MG/1
600 TABLET, FILM COATED ORAL EVERY 12 HOURS
Status: DISCONTINUED | OUTPATIENT
Start: 2017-05-14 | End: 2017-05-16

## 2017-05-14 RX ADMIN — OXYCODONE HYDROCHLORIDE 10 MG: 5 TABLET ORAL at 11:44

## 2017-05-14 RX ADMIN — LEVOFLOXACIN 500 MG: 250 TABLET, FILM COATED ORAL at 15:16

## 2017-05-14 RX ADMIN — OXYCODONE HYDROCHLORIDE 10 MG: 5 TABLET ORAL at 21:43

## 2017-05-14 RX ADMIN — MEROPENEM 500 MG: 500 INJECTION, POWDER, FOR SOLUTION INTRAVENOUS at 03:45

## 2017-05-14 RX ADMIN — HEPARIN SODIUM 5000 UNITS: 5000 INJECTION, SOLUTION INTRAVENOUS; SUBCUTANEOUS at 07:53

## 2017-05-14 RX ADMIN — HEPARIN SODIUM 5000 UNITS: 5000 INJECTION, SOLUTION INTRAVENOUS; SUBCUTANEOUS at 22:42

## 2017-05-14 RX ADMIN — LACTULOSE 10 G: 20 SOLUTION ORAL at 08:55

## 2017-05-14 RX ADMIN — Medication 10 ML: at 21:44

## 2017-05-14 RX ADMIN — LACTULOSE 10 G: 20 SOLUTION ORAL at 17:29

## 2017-05-14 RX ADMIN — AMLODIPINE BESYLATE 10 MG: 5 TABLET ORAL at 08:54

## 2017-05-14 RX ADMIN — LINEZOLID 600 MG: 600 TABLET, FILM COATED ORAL at 15:16

## 2017-05-14 RX ADMIN — Medication 325 MG: at 17:29

## 2017-05-14 RX ADMIN — Medication 325 MG: at 08:54

## 2017-05-14 RX ADMIN — MEROPENEM 500 MG: 500 INJECTION, POWDER, FOR SOLUTION INTRAVENOUS at 08:55

## 2017-05-14 RX ADMIN — PROPRANOLOL HYDROCHLORIDE 10 MG: 10 TABLET ORAL at 08:54

## 2017-05-14 RX ADMIN — SPIRONOLACTONE 25 MG: 25 TABLET, FILM COATED ORAL at 08:55

## 2017-05-14 RX ADMIN — OXYCODONE HYDROCHLORIDE 10 MG: 5 TABLET ORAL at 07:53

## 2017-05-14 RX ADMIN — INSULIN LISPRO 2 UNITS: 100 INJECTION, SOLUTION INTRAVENOUS; SUBCUTANEOUS at 11:23

## 2017-05-14 RX ADMIN — INSULIN LISPRO 2 UNITS: 100 INJECTION, SOLUTION INTRAVENOUS; SUBCUTANEOUS at 22:38

## 2017-05-14 RX ADMIN — INSULIN LISPRO 2 UNITS: 100 INJECTION, SOLUTION INTRAVENOUS; SUBCUTANEOUS at 16:33

## 2017-05-14 RX ADMIN — PROPRANOLOL HYDROCHLORIDE 10 MG: 10 TABLET ORAL at 17:29

## 2017-05-14 RX ADMIN — OXYCODONE HYDROCHLORIDE AND ACETAMINOPHEN 500 MG: 500 TABLET ORAL at 08:54

## 2017-05-14 RX ADMIN — HEPARIN SODIUM 5000 UNITS: 5000 INJECTION, SOLUTION INTRAVENOUS; SUBCUTANEOUS at 15:16

## 2017-05-14 RX ADMIN — LINEZOLID 600 MG: 600 TABLET, FILM COATED ORAL at 22:41

## 2017-05-14 RX ADMIN — OXYCODONE HYDROCHLORIDE 10 MG: 5 TABLET ORAL at 01:27

## 2017-05-14 RX ADMIN — Medication 10 ML: at 07:53

## 2017-05-14 NOTE — PROGRESS NOTES
Attempted IV access without success using vein finder and ultrasound. Patient has very poor veins and has had many unsuccessful IV attempts by staff.  Dr Jas Hunt notified and PICC line to be placed

## 2017-05-14 NOTE — PROGRESS NOTES
Bedside and Verbal shift change report given to Suri PARISI  (oncoming nurse) by Rashida Haque  (offgoing nurse). Report included the following information SBAR and Kardex.

## 2017-05-14 NOTE — PROGRESS NOTES
Spoke with Dr. Spencer Wild after patient was unable to get PICC placed. He stated he would look at her case and reassess what to do. 1419: Dr. Spencer Wild ordered PO antibiotics.

## 2017-05-14 NOTE — PROGRESS NOTES
Family asked if they could bring a two year old into the patient's room. I stated we recommend not to due to the patient being on contact precautions. They stated the patient wanted to see the child and they were taking him into the room. I educated them on the importance of gowning up and washing their hands.

## 2017-05-14 NOTE — PROGRESS NOTES
Spoke with Dr. Corrie Melchor about patient not having IV access for IV antibiotics. Called PICC team they attempted a peripheral IV with the vein finder and the ultrasound, which was unsuccessful. Called Dr. Corrie Melchor back and discussed placing a PICC since we had no other options to give the patient the IV antibiotics needed. He stated to place an order for a PICC and give the patient IV antibiotics through the PICC.

## 2017-05-14 NOTE — PROGRESS NOTES
Hospitalist Progress Note        Date of Service:  2017  NAME:  Shikha Floyd  :  1955  MRN:  [id number]      Admission Summary:   \"60year-old    American woman with past medical history significant for HIV/AIDS   who was diagnosed 20 years ago has lost to followup and not on   HAART treatment currently, last CD4 count was 363 with a high   viral load, chronic hepatitis C and liver cirrhosis, hypertension,   degenerative joint disease, degenerative disk disease, diabetes   mellitus type 2, right lower leg ulcer, bilateral lower leg wound, possible   vasculitis and anemia, who presented to [facility] Emergency   Department with progressive pain and swelling of the right lower leg\"    Interval history / Subjective:   No complaints     Assessment & Plan:     Bilateral legs erythematous blanchable lesions with wounds  -Appreciate wound care/ID input  -abx as per ID recommending merropenem to cover esbl    HIV   -not on HAART    DM type 2  -On SSI  -Monitor    HTN  -stable, adjust meds as needed    Liver cirrhosis   -sec to Hep C    COPD  -stable    Diabetic diet    Code status: FULL CODE  DVT prophylaxis: Heparin    Plan: Follow up with ID        Hospital Problems  Date Reviewed: 5/10/2017          Codes Class Noted POA    * (Principal)Cellulitis of right leg ICD-10-CM: L03.115  ICD-9-CM: 682.6  5/10/2017 Unknown        [facility] ([facility] ICD-10-CM: M36.860  ICD-9-CM: 284.19  3/25/2017 Yes        HIV (human immunodeficiency virus infection) ([facility] ICD-10-CM: Z21  ICD-9-CM: V08  2011 Yes        Diabetes mellitus type 2, controlled ([facility] ICD-10-CM: E11.9  ICD-9-CM: 250.00  2011 Yes        HTN (hypertension) ICD-10-CM: I10  ICD-9-CM: 401.9  2011 Yes                  10 point ROS,pertinent positive noted in HPI and above        Vital Signs:    Last 24hrs VS reviewed since prior progress note.  Most recent are:  Visit Vitals  /83 (BP 1 Location: Left arm, BP Patient Position: At rest)    Pulse 73    Temp 97.2 °F (36.2 °C)    Resp 16    Ht 5' 6\" (1.676 m)    Wt 64.5 kg (142 lb 3 oz)    SpO2 99%    BMI 22.95 kg/m2           Physical Examination:             Constitutional:  No acute distress, cooperative, pleasant    ENT:  Oral mucous moist, oropharynx benign. Neck supple,    Resp:  CTA bilaterally. No wheezing/rhonchi/rales. No accessory muscle use   CV:  Regular rhythm, normal rate, no murmurs, gallops, rubs    GI:  Soft, non distended, non tender. normoactive bowel sounds, no hepatosplenomegaly     Musculoskeletal:  No edema, warm, 2+ pulses throughout    Neurologic:  Moves all extremities. AAOx3, CN II-XII reviewed     Psych:  Good insight, Not anxious nor agitated.    Skin: Bilateral lower extremities swollen with erythematous circular lesions with no scaling no discharge no bleeding       Labs:     Recent Labs      05/14/17   0359  05/13/17   1000   WBC  3.9  3.4*   HGB  10.1*  8.7*   HCT  31.2*  26.7*   PLT  132*  126*     Recent Labs      05/14/17   0359  05/13/17   1000  05/12/17   0231   NA  135*  139  138   K  4.3  3.8  3.8   CL  109*  109*  111*   CO2  18*  20*  22   BUN  16  17  20   CREA  0.87  0.87  0.99   GLU  125*  166*  166*   CA  8.2*  7.7*  7.8*   MG  1.7   --    --        Lab Results   Component Value Date/Time    Glucose (POC) 110 05/14/2017 07:14 AM    Glucose (POC) 148 05/13/2017 09:16 PM    Glucose (POC) 176 05/13/2017 04:12 PM    Glucose (POC) 213 05/13/2017 11:26 AM    Glucose (POC) 134 05/13/2017 06:50 AM                      Katarina Bravo MD

## 2017-05-14 NOTE — PROGRESS NOTES
Bedside and Verbal shift change report given to Suri PARISI  (oncoming nurse) by Kota Villanueva  (offgoing nurse). Report included the following information SBAR and Kardex.

## 2017-05-15 ENCOUNTER — APPOINTMENT (OUTPATIENT)
Dept: GENERAL RADIOLOGY | Age: 62
DRG: 892 | End: 2017-05-15
Attending: FAMILY MEDICINE
Payer: MEDICAID

## 2017-05-15 LAB
ANION GAP BLD CALC-SCNC: 8 MMOL/L (ref 5–15)
BACTERIA SPEC CULT: NORMAL
BASOPHILS # BLD AUTO: 0 K/UL (ref 0–0.1)
BASOPHILS # BLD: 1 % (ref 0–1)
BUN SERPL-MCNC: 16 MG/DL (ref 6–20)
BUN/CREAT SERPL: 18 (ref 12–20)
CALCIUM SERPL-MCNC: 8 MG/DL (ref 8.5–10.1)
CHLORIDE SERPL-SCNC: 109 MMOL/L (ref 97–108)
CO2 SERPL-SCNC: 19 MMOL/L (ref 21–32)
CREAT SERPL-MCNC: 0.9 MG/DL (ref 0.55–1.02)
DIFFERENTIAL METHOD BLD: ABNORMAL
EOSINOPHIL # BLD: 0.1 K/UL (ref 0–0.4)
EOSINOPHIL NFR BLD: 3 % (ref 0–7)
ERYTHROCYTE [DISTWIDTH] IN BLOOD BY AUTOMATED COUNT: 24.2 % (ref 11.5–14.5)
GLUCOSE BLD STRIP.AUTO-MCNC: 149 MG/DL (ref 65–100)
GLUCOSE BLD STRIP.AUTO-MCNC: 172 MG/DL (ref 65–100)
GLUCOSE BLD STRIP.AUTO-MCNC: 190 MG/DL (ref 65–100)
GLUCOSE BLD STRIP.AUTO-MCNC: 222 MG/DL (ref 65–100)
GLUCOSE SERPL-MCNC: 145 MG/DL (ref 65–100)
HCT VFR BLD AUTO: 27.5 % (ref 35–47)
HGB BLD-MCNC: 8.9 G/DL (ref 11.5–16)
LYMPHOCYTES # BLD AUTO: 45 % (ref 12–49)
LYMPHOCYTES # BLD: 1.7 K/UL (ref 0.8–3.5)
MCH RBC QN AUTO: 27.4 PG (ref 26–34)
MCHC RBC AUTO-ENTMCNC: 32.4 G/DL (ref 30–36.5)
MCV RBC AUTO: 84.6 FL (ref 80–99)
MONOCYTES # BLD: 0.5 K/UL (ref 0–1)
MONOCYTES NFR BLD AUTO: 14 % (ref 5–13)
NEUTS SEG # BLD: 1.4 K/UL (ref 1.8–8)
NEUTS SEG NFR BLD AUTO: 37 % (ref 32–75)
NRBC # BLD: 0.06 K/UL (ref 0–0.01)
NRBC BLD-RTO: 1.7 PER 100 WBC
PLATELET # BLD AUTO: 127 K/UL (ref 150–400)
POTASSIUM SERPL-SCNC: 4.2 MMOL/L (ref 3.5–5.1)
RBC # BLD AUTO: 3.25 M/UL (ref 3.8–5.2)
RBC MORPH BLD: ABNORMAL
SERVICE CMNT-IMP: ABNORMAL
SERVICE CMNT-IMP: NORMAL
SODIUM SERPL-SCNC: 136 MMOL/L (ref 136–145)
WBC # BLD AUTO: 3.7 K/UL (ref 3.6–11)
WBC NRBC COR # BLD: ABNORMAL 10*3/UL

## 2017-05-15 PROCEDURE — 74011250636 HC RX REV CODE- 250/636: Performed by: INTERNAL MEDICINE

## 2017-05-15 PROCEDURE — 82962 GLUCOSE BLOOD TEST: CPT

## 2017-05-15 PROCEDURE — 74011250637 HC RX REV CODE- 250/637: Performed by: INTERNAL MEDICINE

## 2017-05-15 PROCEDURE — 74011000250 HC RX REV CODE- 250: Performed by: INTERNAL MEDICINE

## 2017-05-15 PROCEDURE — 36415 COLL VENOUS BLD VENIPUNCTURE: CPT | Performed by: INTERNAL MEDICINE

## 2017-05-15 PROCEDURE — 85025 COMPLETE CBC W/AUTO DIFF WBC: CPT | Performed by: INTERNAL MEDICINE

## 2017-05-15 PROCEDURE — 74011636637 HC RX REV CODE- 636/637: Performed by: HOSPITALIST

## 2017-05-15 PROCEDURE — 65270000029 HC RM PRIVATE

## 2017-05-15 PROCEDURE — 74011250636 HC RX REV CODE- 250/636: Performed by: HOSPITALIST

## 2017-05-15 PROCEDURE — 74011250637 HC RX REV CODE- 250/637: Performed by: HOSPITALIST

## 2017-05-15 PROCEDURE — 80048 BASIC METABOLIC PNL TOTAL CA: CPT | Performed by: INTERNAL MEDICINE

## 2017-05-15 PROCEDURE — 74000 XR ABD (KUB): CPT

## 2017-05-15 RX ORDER — CLONIDINE HYDROCHLORIDE 0.1 MG/1
0.1 TABLET ORAL
Status: DISCONTINUED | OUTPATIENT
Start: 2017-05-15 | End: 2017-05-19 | Stop reason: HOSPADM

## 2017-05-15 RX ORDER — PANTOPRAZOLE SODIUM 40 MG/1
40 TABLET, DELAYED RELEASE ORAL
Status: DISCONTINUED | OUTPATIENT
Start: 2017-05-15 | End: 2017-05-19 | Stop reason: HOSPADM

## 2017-05-15 RX ADMIN — LINEZOLID 600 MG: 600 TABLET, FILM COATED ORAL at 09:29

## 2017-05-15 RX ADMIN — Medication 10 ML: at 07:21

## 2017-05-15 RX ADMIN — OXYCODONE HYDROCHLORIDE 10 MG: 5 TABLET ORAL at 09:30

## 2017-05-15 RX ADMIN — INSULIN LISPRO 3 UNITS: 100 INJECTION, SOLUTION INTRAVENOUS; SUBCUTANEOUS at 12:45

## 2017-05-15 RX ADMIN — Medication 325 MG: at 17:17

## 2017-05-15 RX ADMIN — OXYCODONE HYDROCHLORIDE 10 MG: 5 TABLET ORAL at 14:32

## 2017-05-15 RX ADMIN — HEPARIN SODIUM 5000 UNITS: 5000 INJECTION, SOLUTION INTRAVENOUS; SUBCUTANEOUS at 17:17

## 2017-05-15 RX ADMIN — SPIRONOLACTONE 25 MG: 25 TABLET, FILM COATED ORAL at 09:19

## 2017-05-15 RX ADMIN — PROPRANOLOL HYDROCHLORIDE 10 MG: 10 TABLET ORAL at 09:19

## 2017-05-15 RX ADMIN — AMLODIPINE BESYLATE 10 MG: 5 TABLET ORAL at 09:19

## 2017-05-15 RX ADMIN — HEPARIN SODIUM 5000 UNITS: 5000 INJECTION, SOLUTION INTRAVENOUS; SUBCUTANEOUS at 07:19

## 2017-05-15 RX ADMIN — OXYCODONE HYDROCHLORIDE 10 MG: 5 TABLET ORAL at 18:50

## 2017-05-15 RX ADMIN — PROPRANOLOL HYDROCHLORIDE 10 MG: 10 TABLET ORAL at 17:17

## 2017-05-15 RX ADMIN — LACTULOSE 10 G: 20 SOLUTION ORAL at 09:20

## 2017-05-15 RX ADMIN — Medication 1 CAPSULE: at 09:29

## 2017-05-15 RX ADMIN — HEPARIN SODIUM 5000 UNITS: 5000 INJECTION, SOLUTION INTRAVENOUS; SUBCUTANEOUS at 22:30

## 2017-05-15 RX ADMIN — Medication 10 ML: at 07:11

## 2017-05-15 RX ADMIN — Medication 325 MG: at 09:19

## 2017-05-15 RX ADMIN — OXYCODONE HYDROCHLORIDE 10 MG: 5 TABLET ORAL at 22:44

## 2017-05-15 RX ADMIN — OXYCODONE HYDROCHLORIDE AND ACETAMINOPHEN 500 MG: 500 TABLET ORAL at 09:20

## 2017-05-15 RX ADMIN — LACTULOSE 10 G: 20 SOLUTION ORAL at 17:17

## 2017-05-15 RX ADMIN — LINEZOLID 600 MG: 600 TABLET, FILM COATED ORAL at 22:27

## 2017-05-15 RX ADMIN — OXYCODONE HYDROCHLORIDE 10 MG: 5 TABLET ORAL at 04:41

## 2017-05-15 RX ADMIN — PANTOPRAZOLE SODIUM 40 MG: 40 TABLET, DELAYED RELEASE ORAL at 18:50

## 2017-05-15 RX ADMIN — LIDOCAINE HYDROCHLORIDE 1 G: 10 INJECTION, SOLUTION EPIDURAL; INFILTRATION; INTRACAUDAL; PERINEURAL at 19:52

## 2017-05-15 NOTE — CONSULTS
[address]  [address]       GASTROENTEROLOGY CONSULTATION NOTE        NAME:  Babita Solomon   :   1955   MRN:   [id number]           Consult Date: 5/15/2017 5:47 PM      History of Present Illness:  Patient is a 60 y.o. who is seen in consultation at the request of Dr. Hunter Terry for anemia. She has a PMH as listed below. She was diagnosed with HIV 20 years ago and is not on HAART currently. Her last CD4 count was in s and concern raised for Kaposi Sarcoma on lower extremities. She has a history of HCV and cirrhosis. She has a history of ascites and underwent small volume paracentesis during hospitalization in 3/17 at which time she was found to have culture negative bacterascites and she was treated for SBP under the care of Dr. Edgardo Veronica. During that hospitalization, EGD was proposed if she were to remain inpatient, but she was discharged. During current hospitalization, she was noted to be anemic with Hgb 8.9 and she is having dark stools. She reports having two episodes of small volume hematemesis over the weekend. She was on Inderal as an outpatient. PMH:  Past Medical History:   Diagnosis Date    Allergic rhinitis, cause unspecified 2011    Arthritis 2011    Asthma     Burning with urination     Chronic hepatitis C without mention of hepatic coma 2011    Degenerative Disc Disease 2011    Degenerative Joint Disease 2011    Diabetes ([facility]     Type II    HIV (human immunodeficiency virus infection) ([facility] 2011    Hypertension     Insomnia 2011    Multiple open wounds of lower leg 12/3/2015    Pulmonary nodules 2011       PSH:  Past Surgical History:   Procedure Laterality Date    HX DILATION AND CURETTAGE  1970    HX GYN  1998    partial hystecomy    HX ORTHOPAEDIC      pins/screws in left leg    HX TUBAL LIGATION         Allergies:   Allergies   Allergen Reactions    Codeine Itching     Patient denies Codeine allergy    Penicillin V Hives     Patient denies penicillin allergy. Tolerated Merrem    Pollen Extracts Runny Nose     Itchy, headaches, sneezing       Home Medications:  Prior to Admission Medications   Prescriptions Last Dose Informant Patient Reported? Taking? SPIRIVA WITH HANDIHALER 18 mcg inhalation capsule 2017 at Unknown time  No Yes   Sig: inhale the contents of one capsule in the handihaler once daily   albuterol (PROVENTIL HFA, VENTOLIN HFA, PROAIR HFA) 90 mcg/actuation inhaler   No Yes   Sig: Take 2 puffs by inhalation every four (4) hours as needed for Wheezing. amLODIPine (NORVASC) 10 mg tablet   Yes Yes   Sig: Take 10 mg by mouth daily. ascorbic acid (VITAMIN C) 1,000 mg tablet 2017 at Unknown time  Yes Yes   Sig: Take 1,000 mg by mouth every other day. ferrous sulfate 325 mg (65 mg iron) tablet 2017 at Unknown time  No Yes   Sig: Take 1 Tab by mouth two (2) times daily (with meals). fluticasone-salmeterol (ADVAIR) 250-50 mcg/dose diskus inhaler 2017 at Unknown time  No Yes   Sig: Take 1 Puff by inhalation two (2) times a day. insulin glulisine (APIDRA) 100 unit/mL injection 2017 at Unknown time  Yes Yes   Sig: 10 Units by SubCUTAneous route Daily (before breakfast). insulin lispro (HUMALOG KWIKPEN) 100 unit/mL kwikpen 2017 at Unknown time  No Yes   Si Units by SubCUTAneous route Before breakfast, lunch, dinner and at bedtime. 150-200=2 nits  201-250=4 units  251-300=6 units  301-350=8 units  351-400=10 units  More than 400 call MD   lactulose (CHRONULAC) 10 gram/15 mL solution 2017 at Unknown time  Yes Yes   Sig: Take 10 g by mouth two (2) times a day. omega-3 fatty acids-vitamin e (FISH OIL) 1,000 mg Cap 2017 at Unknown time  Yes Yes   Sig: Take 1 Cap by mouth every other day.    oxyCODONE IR (ROXICODONE) 10 mg tab immediate release tablet 2017 at Unknown time  No Yes   Sig: Take 1 Tab by mouth every four (4) hours as needed. Max Daily Amount: 60 mg.   propranolol (INDERAL) 10 mg tablet 5/9/2017 at Unknown time  No Yes   Sig: Take 1 Tab by mouth two (2) times a day. spironolactone (ALDACTONE) 25 mg tablet 5/10/2017 at Unknown time  Yes Yes   Sig: Take 25 mg by mouth daily. triamcinolone (ARISTOCORT) 0.5 % topical cream   No No   Sig: Apply  to affected area two (2) times a day.  use thin layer      Facility-Administered Medications: None       Hospital Medications:  Current Facility-Administered Medications   Medication Dose Route Frequency    cloNIDine HCl (CATAPRES) tablet 0.1 mg  0.1 mg Oral Q4H PRN    meropenem (MERREM) 500 mg in 0.9% sodium chloride (MBP/ADV) 50 mL  0.5 g IntraVENous Q6H    ertapenem (INVANZ) 1 g in lidocaine (PF) (XYLOCAINE) 10 mg/mL (1 %) 3.2 mL injection  1 g IntraMUSCular DAILY    linezolid (ZYVOX) tablet 600 mg  600 mg Oral Q12H    ferrous sulfate tablet 325 mg  325 mg Oral BID WITH MEALS    ascorbic acid (vitamin C) (VITAMIN C) tablet 500 mg  500 mg Oral DAILY    fish oil-omega-3 fatty acids 340-1,000 mg capsule 1 Cap  1 Cap Oral EVERY OTHER DAY    oxyCODONE IR (ROXICODONE) tablet 10 mg  10 mg Oral Q4H PRN    propranolol (INDERAL) tablet 10 mg  10 mg Oral BID    sodium chloride (NS) flush 5-10 mL  5-10 mL IntraVENous Q8H    sodium chloride (NS) flush 5-10 mL  5-10 mL IntraVENous PRN    heparin (porcine) injection 5,000 Units  5,000 Units SubCUTAneous Q8H    insulin lispro (HUMALOG) injection   SubCUTAneous AC&HS    glucose chewable tablet 16 g  4 Tab Oral PRN    dextrose (D50W) injection syrg 12.5-25 g  12.5-25 g IntraVENous PRN    glucagon (GLUCAGEN) injection 1 mg  1 mg IntraMUSCular PRN    albuterol-ipratropium (DUO-NEB) 2.5 MG-0.5 MG/3 ML  3 mL Nebulization Q4H PRN    spironolactone (ALDACTONE) tablet 25 mg  25 mg Oral DAILY    amLODIPine (NORVASC) tablet 10 mg  10 mg Oral DAILY    lactulose (CHRONULAC) solution 10 g  10 g Oral BID       Social History:  Social History   Substance Use Topics    Smoking status: Current Every Day Smoker     Packs/day: 0.50     Years: 15.00     Types: Cigarettes    Smokeless tobacco: Never Used    Alcohol use No       Family History:  Family History   Problem Relation Age of Onset    Diabetes Mother     Hypertension Mother     Diabetes Father        Review of Systems:  Constitutional: negative fever, negative chills, negative weight loss  Eyes:   negative visual changes  ENT:   negative sore throat, tongue or lip swelling  Respiratory:  negative cough, negative dyspnea  Cards:  negative for chest pain, palpitations, lower extremity edema  GI:   See HPI  :  negative for frequency, dysuria  Integument:  negative for rash and pruritus  Heme:  negative for easy bruising and gum/nose bleeding  Musculoskel: negative for myalgias,  back pain and muscle weakness  Neuro: negative for headaches, dizziness, vertigo  Psych:  negative for feelings of anxiety, depression     Objective:   Patient Vitals for the past 8 hrs:   BP Temp Pulse Resp SpO2   05/15/17 1521 157/77 97.5 °F (36.4 °C) 78 16 98 %        05/13 1901 - 05/15 0700  In: -   Out: 1000 [Urine:1000]    PHYSICAL EXAM:  General: WD, WN. Alert, cooperative, no acute distress    HEENT: NC, Atraumatic. PERRLA, EOMI. Anicteric sclerae. Lungs:  CTA Bilaterally. No Wheezing/Rhonchi/Rales. Heart:  Regular  rhythm,  No murmur (), No Rubs, No Gallops  Abdomen: Soft, Non distended, Non tender.  +Bowel sounds, no HSM  Extremities: No c/c/e, multiple lower extremity skin lesions  Neurologic:  CN 2-12 gi, Alert and oriented X 3. No acute neurological distress   Psych:   Good insight. Not anxious nor agitated.      Data Review     Recent Labs      05/15/17   0444  05/14/17   0359   WBC  3.7  3.9   HGB  8.9*  10.1*   HCT  27.5*  31.2*   PLT  127*  132*     Recent Labs      05/15/17   0444  05/14/17   0359   NA  136  135*   K  4.2  4.3   CL  109*  109*   CO2  19*  18*   BUN  16  16   CREA  0.90  0.87 GLU  145*  125*   CA  8.0*  8.2*     No results for input(s): SGOT, GPT, AP, TBIL, TP, ALB, GLOB, GGT, AML, LPSE in the last 72 hours. No lab exists for component: AMYP, HLPSE  No results for input(s): INR, PTP, APTT in the last 72 hours. No lab exists for component: INREXT         Assessment:   · HCV with cirrhosis  · History of small volume ascites with SBP for which she underwent treatment during 3/17 hospitalization  · Anemia  · Melena  · HIV/AIDS with low CD4 count, not on HAART     Plan:   · Octreotide if IV access obtained  · Continue non-selective beta blockage if BP permits (since octreotide cannot be started at this time)  · PPI  · She is on antibiotics currently (which should serve for SBP prophylaxis in setting of a GI bleeding in cirrhotic patient)  · Avoid NSAID's  · NPO after midnight  · Possible EGD tomorrow if IV access can be obtained. Signed By: Tessy Sheth.  Radames Hawley MD     5/15/2017  5:47 PM

## 2017-05-15 NOTE — DIABETES MGMT
DTC Progress Note    Recommendations/ Comments: Chart reviewed due to hyperglycemia. FBS are in range but post prandial blood sugars have been elevated. Note pt refused correction insulin this morning and lunch time blood sugar was 222 mg/dl. If appropriate and patient is consuming at least 50% of her tray, please consider Amaryl 1 mg to help with post prandial rise. [facility] reviewed on [facility]. Patient is a 60 y.o. female with known diabetes on Apidra 10 units at breakfast and Humalog sliding scale per PTA at home. A1c:   Lab Results   Component Value Date/Time    Hemoglobin A1c 5.8 03/29/2017 03:56 AM    Hemoglobin A1c 5.4 03/25/2017 06:59 PM       Recent Glucose Results: Lab Results   Component Value Date/Time     (H) 05/15/2017 04:44 AM    GLUCPOC 222 (H) 05/15/2017 12:40 PM    GLUCPOC 149 (H) 05/15/2017 07:35 AM    GLUCPOC 213 (H) 05/14/2017 09:32 PM        Lab Results   Component Value Date/Time    Creatinine 0.90 05/15/2017 04:44 AM       Active Orders   Diet    DIET DIABETIC CONSISTENT CARB Regular        PO intake: Patient Vitals for the past 72 hrs:   % Diet Eaten   05/12/17 1740 50 %       Current hospital DM medication: correction scale Humalog, normal sensitivity. Will continue to follow as needed.     Thank you  Juan Dickey RD, CDE

## 2017-05-15 NOTE — PROGRESS NOTES
CM placed called to [facility] to inquire about daily IM ABX. This CM informed that pt can received daily IM injections of Ertapenem 1gm at the [facility]. CM will continue to follow. Order sheet is on front of hard chart for [facility].    Arthur Lerma RN CRM

## 2017-05-15 NOTE — PROGRESS NOTES
Hospitalist Progress Note          Sanford Laguerre MD  Please call  and page for questions. Call physician on-call through the  7pm-7am    Daily Progress Note: 5/15/2017    Primary care provider:Shabbir Chavis MD    Date of admission: 5/10/2017  9:20 AM    Admission summery and hospital course:  44-year-old  woman with past medical history significant for HIV/AIDS who was diagnosed 20 years ago has lost to followup and not on HAART treatment currently, last CD4 count was 363 with a high viral load, chronic hepatitis C and liver cirrhosis, hypertension, degenerative joint disease, degenerative disk disease, diabetes   mellitus type 2, right lower leg ulcer, bilateral lower leg wound, possible vasculitis and anemia, who presented to [address] [place] [address] Emergency Department with progressive pain and swelling of the right lower leg. Subjective:   Patient said she is having abdominal pain and had black color BM this AM.         Assessment/Plan:   Abdominal pain with dark color stool:  Recent CT noted. Will get KUB today. With the H/O cirrhosis and anemia and dark color stool, will get GI opinion. Bilateral legs erythematous blanchable lesions with wounds  -Appreciate wound care/ID input  -abx as per ID recommendation,      HIV   -not on HAART     DM type 2  -On SSI  -Monitor     HTN  -stable, adjust meds as needed. Clonidine as PRN.      Liver cirrhosis   -sec to Hep C   COPD  -stable     See orders for other plans. VTE prophylaxis: Heparin  Code status: Full  Discussed plan of care with Patient/Family and Nurse. Pre-admission lived at home. Discharge planning: pending.         Review of Systems:     Review of Systems:  Symptom  Y/N  Comments   Symptom  Y/N  Comments    Fever/Chills   n   Chest Pain  n    Poor Appetite   y   Edema   n    Cough  n   Abdominal Pain   n    Sputum  n   Joint Pain      SOB/HUNTLEY  n   Pruritis/Rash      Nausea/vomit  n Tolerating PT/OT      Diarrhea     Tolerating Diet      Constipation  y   Other      Could not obtain due to:         Objective:   Physical Exam:     Visit Vitals    /79 (BP 1 Location: Right arm, BP Patient Position: Post activity)    Pulse 72    Temp 97.6 °F (36.4 °C)    Resp 16    Ht 5' 6\" (1.676 m)    Wt 64.5 kg (142 lb 3 oz)  Comment: unable to stand at present time    SpO2 100%    BMI 22.95 kg/m2      O2 Device: Room air    Temp (24hrs), Av.7 °F (36.5 °C), Min:97.6 °F (36.4 °C), Max:97.8 °F (36.6 °C)         1901 - 05/15 0700  In: -   Out: 1000 [Urine:1000]      General:  Alert, cooperative, no distress, appears stated age. Lungs:   Clear to auscultation bilaterally. Chest wall:  No tenderness or deformity. Heart:  Regular rate and rhythm, S1, S2 normal, no murmur. Abdomen:   Soft, non-tender. Bowel sounds normal.    Extremities: Extremities normal, atraumatic, no cyanosis or edema. Skin: Wounds at the right leg covered with dressing. No rashes or lesions   Neurologic: CNII-XII intact. Data Review:       Recent Days:  Recent Labs      05/15/17   0444  17   0359  17   1000   WBC  3.7  3.9  3.4*   HGB  8.9*  10.1*  8.7*   HCT  27.5*  31.2*  26.7*   PLT  127*  132*  126*     Recent Labs      05/15/17   0444  17   0359  17   1000   NA  136  135*  139   K  4.2  4.3  3.8   CL  109*  109*  109*   CO2  19*  18*  20*   GLU  145*  125*  166*   BUN  16    17   CREA  0.90  0.87  0.87   CA  8.0*  8.2*  7.7*   MG   --   1.7   --      No results for input(s): PH, PCO2, PO2, HCO3, FIO2 in the last 72 hours.     24 Hour Results:  Recent Results (from the past 24 hour(s))   GLUCOSE, POC    Collection Time: 17 11:01 AM   Result Value Ref Range    Glucose (POC) 155 (H) 65 - 100 mg/dL    Performed by [address] [place] [address], POC    Collection Time: 17  4:21 PM   Result Value Ref Range    Glucose (POC) 220 (H) 65 - 100 mg/dL    Performed by Cori Becerril GLUCOSE, POC    Collection Time: 05/14/17  9:32 PM   Result Value Ref Range    Glucose (POC) 213 (H) 65 - 100 mg/dL    Performed by VICTORINO FARIDA    METABOLIC PANEL, BASIC    Collection Time: 05/15/17  4:44 AM   Result Value Ref Range    Sodium 136 136 - 145 mmol/L    Potassium 4.2 3.5 - 5.1 mmol/L    Chloride 109 (H) 97 - 108 mmol/L    CO2 19 (L) 21 - 32 mmol/L    Anion gap 8 5 - 15 mmol/L    Glucose 145 (H) 65 - 100 mg/dL    BUN 16 6 - 20 MG/DL    Creatinine 0.90 0.55 - 1.02 MG/DL    BUN/Creatinine ratio 18 12 - 20      GFR est AA >60 >60 ml/min/1.73m2    GFR est non-AA >60 >60 ml/min/1.73m2    Calcium 8.0 (L) 8.5 - 10.1 MG/DL   CBC WITH AUTOMATED DIFF    Collection Time: 05/15/17  4:44 AM   Result Value Ref Range    WBC 3.7 3.6 - 11.0 K/uL    RBC 3.25 (L) 3.80 - 5.20 M/uL    HGB 8.9 (L) 11.5 - 16.0 g/dL    HCT 27.5 (L) 35.0 - 47.0 %    MCV 84.6 80.0 - 99.0 FL    MCH 27.4 26.0 - 34.0 PG    MCHC 32.4 30.0 - 36.5 g/dL    RDW 24.2 (H) 11.5 - 14.5 %    PLATELET 646 (L) 852 - 400 K/uL    NEUTROPHILS 37 32 - 75 %    LYMPHOCYTES 45 12 - 49 %    MONOCYTES 14 (H) 5 - 13 %    EOSINOPHILS 3 0 - 7 %    BASOPHILS 1 0 - 1 %    ABS. NEUTROPHILS 1.4 (L) 1.8 - 8.0 K/UL    ABS. LYMPHOCYTES 1.7 0.8 - 3.5 K/UL    ABS. MONOCYTES 0.5 0.0 - 1.0 K/UL    ABS. EOSINOPHILS 0.1 0.0 - 0.4 K/UL    ABS.  BASOPHILS 0.0 0.0 - 0.1 K/UL    DF SMEAR SCANNED      RBC COMMENTS ANISOCYTOSIS  1+       NUCLEATED RBC    Collection Time: 05/15/17  4:44 AM   Result Value Ref Range    NRBC 1.7 (H) 0  WBC    ABSOLUTE NRBC 0.06 (H) 0.00 - 0.01 K/uL    WBC CORRECTED FOR NR ADJUSTED FOR NUCLEATED RBC'S     GLUCOSE, POC    Collection Time: 05/15/17  7:35 AM   Result Value Ref Range    Glucose (POC) 149 (H) 65 - 100 mg/dL    Performed by Sharmila Charles        Problem List:  Problem List as of 5/15/2017  Date Reviewed: 5/10/2017          Codes Class Noted - Resolved    * (Principal)Cellulitis of right leg ICD-10-CM: L03.115  ICD-9-CM: 682.6  5/10/2017 - Present        Generalized abdominal pain ICD-10-CM: R10.84  ICD-9-CM: 789.07  3/25/2017 - Present        Pancytopenia ([facility] ICD-10-CM: X73.256  ICD-9-CM: 284.19  3/25/2017 - Present        Multiple open wounds of lower leg ICD-10-CM: S81.809A  ICD-9-CM: 894.0  12/3/2015 - Present        Boils ICD-10-CM: L02.92  ICD-9-CM: 680.9  1/15/2015 - Present        Chronic airway obstruction, not elsewhere classified (Chronic) ICD-10-CM: J44.9  ICD-9-CM: 499  12/23/2013 - Present        COPD exacerbation ([facility] ICD-10-CM: J44.1  ICD-9-CM: 491.21  12/23/2013 - Present        Degenerative Joint Disease ICD-10-CM: M19.90  ICD-9-CM: 715.90  7/21/2011 - Present        Degenerative Disc Disease ICD-10-CM: HFX7525  ICD-9-CM: 722.6  7/21/2011 - Present        Chronic hepatitis C without mention of hepatic coma ICD-10-CM: B18.2  ICD-9-CM: 070.54  7/21/2011 - Present        Insomnia ICD-10-CM: G47.00  ICD-9-CM: 780.52  7/21/2011 - Present        Allergic rhinitis, cause unspecified ICD-10-CM: J30.9  ICD-9-CM: 477.9  7/21/2011 - Present        HIV (human immunodeficiency virus infection) ([facility] ICD-10-CM: Z21  ICD-9-CM: V08  7/21/2011 - Present        Pulmonary nodules ICD-10-CM: R91.8  ICD-9-CM: 793.19  7/21/2011 - Present        Diabetes mellitus type 2, controlled ([facility] ICD-10-CM: E11.9  ICD-9-CM: 250.00  7/21/2011 - Present        Bruised ribs ICD-10-CM: F94.285W  ICD-9-CM: 922.1  7/21/2011 - Present        Sprain and strain of unspecified site of shoulder and upper arm ICD-10-CM: O77.073O, O11.408T  ICD-9-CM: 840.9  7/21/2011 - Present        Arthritis, Degenerative, Knee ICD-10-CM: M17.10  ICD-9-CM: 715.96  7/21/2011 - Present        HTN (hypertension) ICD-10-CM: I10  ICD-9-CM: 401.9  7/21/2011 - Present        RESOLVED: COPD exacerbation ([facility] ICD-10-CM: J44.1  ICD-9-CM: 491.21  12/16/2013 - 12/23/2013              Medications reviewed  Current Facility-Administered Medications   Medication Dose Route Frequency    cloNIDine HCl (CATAPRES) tablet 0.1 mg  0.1 mg Oral Q4H PRN    meropenem (MERREM) 500 mg in 0.9% sodium chloride (MBP/ADV) 50 mL  0.5 g IntraVENous Q6H    linezolid (ZYVOX) tablet 600 mg  600 mg Oral Q12H    ferrous sulfate tablet 325 mg  325 mg Oral BID WITH MEALS    ascorbic acid (vitamin C) (VITAMIN C) tablet 500 mg  500 mg Oral DAILY    fish oil-omega-3 fatty acids 340-1,000 mg capsule 1 Cap  1 Cap Oral EVERY OTHER DAY    oxyCODONE IR (ROXICODONE) tablet 10 mg  10 mg Oral Q4H PRN    propranolol (INDERAL) tablet 10 mg  10 mg Oral BID    sodium chloride (NS) flush 5-10 mL  5-10 mL IntraVENous Q8H    sodium chloride (NS) flush 5-10 mL  5-10 mL IntraVENous PRN    heparin (porcine) injection 5,000 Units  5,000 Units SubCUTAneous Q8H    insulin lispro (HUMALOG) injection   SubCUTAneous AC&HS    glucose chewable tablet 16 g  4 Tab Oral PRN    dextrose (D50W) injection syrg 12.5-25 g  12.5-25 g IntraVENous PRN    glucagon (GLUCAGEN) injection 1 mg  1 mg IntraMUSCular PRN    albuterol-ipratropium (DUO-NEB) 2.5 MG-0.5 MG/3 ML  3 mL Nebulization Q4H PRN    spironolactone (ALDACTONE) tablet 25 mg  25 mg Oral DAILY    amLODIPine (NORVASC) tablet 10 mg  10 mg Oral DAILY    lactulose (CHRONULAC) solution 10 g  10 g Oral BID       Care Plan discussed with: Patient/Family and Nurse    Total time spent with patient: 30 minutes.     Edith Amanda MD

## 2017-05-15 NOTE — ADT AUTH CERT NOTES
Utilization Review           Cellulitis - Care Day 3 (5/12/2017) by Tony Ndiaye RN        Review Status Review Entered       Completed 5/15/2017       Details              Care Day: 3 Care Date: 5/12/2017 Level of Care: Inpatient Floor       Guideline Day 3        Level Of Care       (X) Floor to discharge [E]              Clinical Status       (X) * Fever absent or improved       (X) * Skin exam stable or improved       (X) * Hemodynamic stability       (X) * Mental status at baseline       (X) * Antibiotic treatment needs appropriate for next level of care       (X) * Pain absent or manageable at next level of care       ( ) * Discharge plans and education understood              Activity       (X) * Ambulatory       5/15/2017 1:00 PM EDT by Jewell Cartagena         up ad jeremías                     Routes       (X) * Oral hydration, medications, [F] and diet       5/15/2017 1:00 PM EDT by Jewell Cartagena         diabetic diet                     Medications       (X) Parenteral or oral antibiotics [B]       5/15/2017 1:00 PM EDT by Jewell Cartagena         iv abx                     5/15/2017 1:21 PM EDT by Jewell Cartagena       Subject: Additional Clinical Information       [id number] labs results that were sent out and resulted yesterday afternoon-% cd 4 pos lymph 29.9treponema pallidum ab negativetoxoplasma gondii ab, igg, qt 4.2cd4 is 389 with viral load of 359,000              5/15/2017 1:00 PM EDT by Jewell Cartagena       Subject: Additional Clinical Information       5-12-17Pt still having pain on movement of the legs.   Wound culture staph aureus.   Bilat lower ext swollen with erythematous circular lesions with no scaling no discharge no bleeding. Vitals-133/69, 74, 98.2, 16, 100%. Abnl labs-cl 111glucose 166ca 7.8mrsa-+wound culture w/gram stain-mrsa +, e coli +Meds-norvasc 10 mg po q dvit c 500 mg po q dferous sulfate tab 325 mg po bidheparin 5000 u sc q 8 hrssliding scale insulin scchronulac 10 g po bidoxycodone 10 mg po q 4 hrs prn x 4inderal 10 mg po bidaldactone 25 mg po q dlevaquin 750 mg iv q 24 hrsmerrem 500 mg iv q 6 hrsvancocin 1g iv q 16 hrsPlan-iv fluidsiv abxdiabetic dietinfectious disease consultcontact isolationup ad jeremías                                   * Milestone                  Cellulitis - Care Day 6 (5/15/2017) by Fermín Carlin RN        Review Status Review Entered       Completed 5/15/2017       Details              Care Day: 6 Care Date: 5/15/2017 Level of Care: Inpatient Floor       Guideline Day 3        Level Of Care       (X) Floor to discharge [E]              Clinical Status       (X) * Fever absent or improved       (X) * Skin exam stable or improved       (X) * Hemodynamic stability       (X) * Mental status at baseline       (X) * Antibiotic treatment needs appropriate for next level of care       (X) * Pain absent or manageable at next level of care       ( ) * Discharge plans and education understood              Activity       (X) * Ambulatory       5/15/2017 1:18 PM EDT by Pat Prado         up ad jeremías                     Routes       (X) * Oral hydration, medications, [F] and diet       5/15/2017 1:18 PM EDT by Pat Prado         po meds diabetic diet                     Interventions       (X) WBC       5/15/2017 1:18 PM EDT by Pat Prado         wbc 3.7                     Medications       (X) Parenteral or oral antibiotics [B]       5/15/2017 1:18 PM EDT by Pat Prado         iv abx po abx                     5/15/2017 1:18 PM EDT by Pat Prado       Subject: Additional Clinical Information       5-15-17Pt said she is having abd pain and had black color bm this am.   Abd soft, non tender, bowel sounds normal.   Ext normal, atraumatic, no cyanosis or edema. Wounds at the rt leg covered with dressing. No rashes or lesions. Vitals-158/79, 72, 97.6, 16, 100%. Abnl labs-rbc 3.25hgb 8.9hct 27.5plt 127mono 14cl 109co2 19glucose 145ca 8.0Meds-norvasc 10 mg po q dvit c 500 mg po q dferrous sulfate 325 mg po bidheparin 5000 u sc q 8 hrssliding scale insulin scchronulac 10 g po bidzyvox 600 mg po q 12 hrsmerem 500 mg iv q 6 hrsinderal 10 mg po bidaldactone 25 mg po q doxycodone 10 mg po q 4 hrs prn x 2Plan-gi consultinfectious disease consultup ad libdiabetic dietiv abxpo abxcase mgt working on d/c plan for out pt iv abx.                                   * Milestone                  Cellulitis - Care Day 5 (5/14/2017) by Jaylin Mari RN        Review Status Review Entered       Completed 5/15/2017       Details              Care Day: 5 Care Date: 5/14/2017 Level of Care: Inpatient Floor       Guideline Day 3        Level Of Care       (X) Floor to discharge [E]              Clinical Status       (X) * Fever absent or improved       (X) * Skin exam stable or improved       (X) * Hemodynamic stability       (X) * Mental status at baseline       (X) * Antibiotic treatment needs appropriate for next level of care       (X) * Pain absent or manageable at next level of care       ( ) * Discharge plans and education understood              Activity       (X) * Ambulatory       5/15/2017 1:11 PM EDT by Alirio Amanda         up ad jeremías                     Routes       (X) * Oral hydration, medications, [F] and diet       5/15/2017 1:11 PM EDT by Alirio Amanda         diabeitc diet po meds                     Interventions       (X) WBC       5/15/2017 1:11 PM EDT by Alirio Amanda         wbc 3.9                     Medications       (X) Parenteral or oral antibiotics [B]       5/15/2017 1:11 PM EDT by Alirio Amanda         iv and po abx                     5/15/2017 1:11 PM EDT by Alirio Amanda       Subject: Additional Clinical Information       5-14-17No new complaints.   Bilat lowe ext swollen with erythematous circular lesions.   No scaling/discharge/bleeding. Vitals-152/83, 73, 97.25, 16, 99%. Abnl labs-rbc 3.67hgb 10.1hct 31.2plt 132na 135cl 109glucose 125ca 8.2Meds-merrem 500 mg iv q 6 hrs levaquin 500 mg po q 24 hrsaldactone 25 mg po q dinderal 10 mg po bidoxycodone 10 mg po q 4 hrs prn x 4zyox 600 mg po q 12 hrschronulac 10 g po bidsliding scale insulin scheparin 5000 u sc q 8 hrsferrous sulfate 325 mg po bidvit c 500 mg po q dnorvasc 100 mg po q dPlan-iv abxdibetic dietpain mgtup ad liblabs                                   * Milestone                  Cellulitis - Care Day 4 (5/13/2017) by Lorenzo Neely RN        Review Status Review Entered       Completed 5/15/2017       Details              Care Day: 4 Care Date: 5/13/2017 Level of Care: Inpatient Floor       Guideline Day 3        Level Of Care       (X) Floor to discharge [E]              Clinical Status       (X) * Fever absent or improved       (X) * Skin exam stable or improved       (X) * Hemodynamic stability       (X) * Mental status at baseline       (X) * Antibiotic treatment needs appropriate for next level of care       (X) * Pain absent or manageable at next level of care       ( ) * Discharge plans and education understood              Activity       (X) * Ambulatory       5/15/2017 1:05 PM EDT by Catherine Goodman         up ad jeremías                     Routes       (X) * Oral hydration, medications, [F] and diet       5/15/2017 1:05 PM EDT by Catherine Goodman         diabetic diet po meds                     Interventions       (X) WBC       5/15/2017 1:05 PM EDT by Catherine Goodman         wbc 3.4                     Medications       (X) Parenteral or oral antibiotics [B]       5/15/2017 1:05 PM EDT by Catherine Goodman         iv abx                     5/15/2017 1:05 PM EDT by Catherine Goodman       Subject: Additional Clinical Information       6-53-76Jgttq uncomfortable but feels better compared to yesterday.   Bilat legs erythematous  conner able lesions with wounds. Vitals-133/82, 75, 98.0, 14, 99%. Abnl labs-wbc 3.4rbc 3.2hgb 8. 7hct 26.7plt 126cl 109glucose 166ca 7.7Meds-norvasc 10 mg po q dvit c 500 mg po q dferrous sulfate 325 mg po bidheparin 5000 u sc q 8 hrssliding scale insulin scchronulac 10 g po bidoxycodone 10 mg po q 4 hrs prn x 2inderal 10 mg po bidaldactone 25 mg po q dmerrem 500 mg iv q 6 hrsvancocin 1 g iv q 16 hrsvancocin 1250 iv q 12 hrsPlan-diabetic dietabx per infections diseaseiv abxpain mgtup ad jeremías

## 2017-05-16 LAB
GLUCOSE BLD STRIP.AUTO-MCNC: 149 MG/DL (ref 65–100)
GLUCOSE BLD STRIP.AUTO-MCNC: 160 MG/DL (ref 65–100)
GLUCOSE BLD STRIP.AUTO-MCNC: 171 MG/DL (ref 65–100)
GLUCOSE BLD STRIP.AUTO-MCNC: 282 MG/DL (ref 65–100)
HEMOCCULT STL QL: POSITIVE
SERVICE CMNT-IMP: ABNORMAL

## 2017-05-16 PROCEDURE — 74011250637 HC RX REV CODE- 250/637: Performed by: HOSPITALIST

## 2017-05-16 PROCEDURE — 82962 GLUCOSE BLOOD TEST: CPT

## 2017-05-16 PROCEDURE — 74011250637 HC RX REV CODE- 250/637: Performed by: INTERNAL MEDICINE

## 2017-05-16 PROCEDURE — 82272 OCCULT BLD FECES 1-3 TESTS: CPT | Performed by: HOSPITALIST

## 2017-05-16 PROCEDURE — 74011250636 HC RX REV CODE- 250/636: Performed by: INTERNAL MEDICINE

## 2017-05-16 PROCEDURE — 74011000250 HC RX REV CODE- 250: Performed by: INTERNAL MEDICINE

## 2017-05-16 PROCEDURE — 74011636637 HC RX REV CODE- 636/637: Performed by: HOSPITALIST

## 2017-05-16 PROCEDURE — A6209 FOAM DRSG <=16 SQ IN W/O BDR: HCPCS

## 2017-05-16 PROCEDURE — 74011250636 HC RX REV CODE- 250/636: Performed by: HOSPITALIST

## 2017-05-16 PROCEDURE — 65270000029 HC RM PRIVATE

## 2017-05-16 RX ORDER — DOXYCYCLINE HYCLATE 100 MG
100 TABLET ORAL EVERY 12 HOURS
Status: DISCONTINUED | OUTPATIENT
Start: 2017-05-16 | End: 2017-05-19 | Stop reason: HOSPADM

## 2017-05-16 RX ADMIN — AMLODIPINE BESYLATE 10 MG: 5 TABLET ORAL at 10:42

## 2017-05-16 RX ADMIN — OXYCODONE HYDROCHLORIDE 10 MG: 5 TABLET ORAL at 05:21

## 2017-05-16 RX ADMIN — HEPARIN SODIUM 5000 UNITS: 5000 INJECTION, SOLUTION INTRAVENOUS; SUBCUTANEOUS at 22:18

## 2017-05-16 RX ADMIN — PROPRANOLOL HYDROCHLORIDE 10 MG: 10 TABLET ORAL at 17:05

## 2017-05-16 RX ADMIN — PROPRANOLOL HYDROCHLORIDE 10 MG: 10 TABLET ORAL at 10:43

## 2017-05-16 RX ADMIN — INSULIN LISPRO 2 UNITS: 100 INJECTION, SOLUTION INTRAVENOUS; SUBCUTANEOUS at 12:02

## 2017-05-16 RX ADMIN — INSULIN LISPRO 5 UNITS: 100 INJECTION, SOLUTION INTRAVENOUS; SUBCUTANEOUS at 17:04

## 2017-05-16 RX ADMIN — RIFAXIMIN 550 MG: 550 TABLET ORAL at 17:05

## 2017-05-16 RX ADMIN — SPIRONOLACTONE 25 MG: 25 TABLET, FILM COATED ORAL at 10:43

## 2017-05-16 RX ADMIN — DOXYCYCLINE HYCLATE 100 MG: 100 TABLET ORAL at 22:18

## 2017-05-16 RX ADMIN — INSULIN LISPRO 2 UNITS: 100 INJECTION, SOLUTION INTRAVENOUS; SUBCUTANEOUS at 07:04

## 2017-05-16 RX ADMIN — OXYCODONE HYDROCHLORIDE AND ACETAMINOPHEN 500 MG: 500 TABLET ORAL at 10:43

## 2017-05-16 RX ADMIN — LINEZOLID 600 MG: 600 TABLET, FILM COATED ORAL at 09:00

## 2017-05-16 RX ADMIN — OXYCODONE HYDROCHLORIDE 10 MG: 5 TABLET ORAL at 14:26

## 2017-05-16 RX ADMIN — HEPARIN SODIUM 5000 UNITS: 5000 INJECTION, SOLUTION INTRAVENOUS; SUBCUTANEOUS at 17:05

## 2017-05-16 RX ADMIN — LIDOCAINE HYDROCHLORIDE 1 G: 10 INJECTION, SOLUTION EPIDURAL; INFILTRATION; INTRACAUDAL; PERINEURAL at 20:40

## 2017-05-16 RX ADMIN — OXYCODONE HYDROCHLORIDE 10 MG: 5 TABLET ORAL at 20:23

## 2017-05-16 RX ADMIN — Medication 325 MG: at 17:05

## 2017-05-16 NOTE — PROGRESS NOTES
Bedside and Verbal shift change report given to Kenny Amado RN (oncoming nurse) by Paz Humphreys RN (offgoing nurse). Report included the following information SBAR and Kardex.

## 2017-05-16 NOTE — PROGRESS NOTES
ID Progress Note  2017       vanco  Discontinued  merrem  Discontinued   levaquin  linezolid     Subjective:     Afebrile. Has some leg pain. Objective:     Vitals:   Visit Vitals    /81 (BP 1 Location: Left arm, BP Patient Position: At rest)    Pulse 79    Temp 97.7 °F (36.5 °C)    Resp 14    Ht 5' 6\" (1.676 m)    Wt 64.5 kg (142 lb 3 oz)  Comment: unable to stand at present time    SpO2 99%    BMI 22.95 kg/m2        Tmax:  Temp (24hrs), Av.7 °F (36.5 °C), Min:97.6 °F (36.4 °C), Max:97.8 °F (36.6 °C)      Exam:    Not in distress  Bilateral legs have some scabs. There is no louis cellulitis. Labs:   Lab Results   Component Value Date/Time    WBC 3.7 05/15/2017 04:44 AM    Hemoglobin (POC) 14.1 2012 05:07 PM    HGB 8.9 05/15/2017 04:44 AM    HCT 27.5 05/15/2017 04:44 AM    PLATELET 289  04:44 AM    MCV 84.6 05/15/2017 04:44 AM     Lab Results   Component Value Date/Time    Sodium 136 05/15/2017 04:44 AM    Potassium 4.2 05/15/2017 04:44 AM    Chloride 109 05/15/2017 04:44 AM    CO2 19 05/15/2017 04:44 AM    Anion gap 8 05/15/2017 04:44 AM    Glucose 145 05/15/2017 04:44 AM    BUN 16 05/15/2017 04:44 AM    Creatinine 0.90 05/15/2017 04:44 AM    BUN/Creatinine ratio 18 05/15/2017 04:44 AM    GFR est AA >60 05/15/2017 04:44 AM    GFR est non-AA >60 05/15/2017 04:44 AM    Calcium 8.0 05/15/2017 04:44 AM    Bilirubin, total 0.6 2017 06:31 AM    AST (SGOT) 88 2017 06:31 AM    Alk. phosphatase 96 2017 06:31 AM    Protein, total 7.6 2017 06:31 AM    Albumin 1.6 2017 06:31 AM    Globulin 6.0 2017 06:31 AM    A-G Ratio 0.3 2017 06:31 AM    ALT (SGPT) 69 2017 06:31 AM           Assessment:       1. Human immunodeficiency virus. 2. Hepatitis C with liver cirrhosis. 3. Bilateral leg ulcers. 4. Diabetes.       Recommendations:     cd4 is 389 with viral load of 359,000. She does not need prophylaxis for OIs.  She will need treatment but this should started as an outpatient as a test for her compliance. Note of concern for [place]. Biopsy in march 2017 did not reveal this. Her current cd4 count also makes this less likely. Discontinue zyvox as she is not getting blood draws. Would place on doxy  .      Santiago Bridges MD

## 2017-05-16 NOTE — PROGRESS NOTES
0030 Pt to be NPO pending procedure. Reminded Pt and removed all drinks and snacks from bedside table. Pt stated she would not eat or drink anything. Also reported that Pt refusing labs. 0400 Nurse found unopened sleeve of crackers in Pt's Bed. Uncertain if Pt has been NPO compliant. Can remove from immediate area of Pt however still appears. Continue to remind Pt of NPO status.

## 2017-05-16 NOTE — PROGRESS NOTES
[address]  [address] [facility], [address]       GI PROGRESS NOTE  Raina Jade [facility]  [phone]    NAME: Henry Hdz   :  1955   MRN:  [id number]       Subjective:     \"passing my bowels all night\", \"it's from that medicine\" ( assume this is the lactulose. They were not able to get IV access. She is requesting food. She has no belly pain, nausea or vomiting. Objective:     VITALS:   Last 24hrs VS reviewed since prior progress note.  Most recent are:  Visit Vitals    /82 (BP 1 Location: Right arm, BP Patient Position: At rest)    Pulse 68    Temp 97.6 °F (36.4 °C)    Resp 16    Ht 5' 6\" (1.676 m)    Wt 64.5 kg (142 lb 3 oz)    SpO2 98%    BMI 22.95 kg/m2       PHYSICAL EXAM:  General: No acute distress    Neurologic:  Alert and oriented X 2  HEENT: No scleral icteric  Lungs:  No Wheezing  Heart:  s1 s2  Abdomen: Soft, Non distended, Non tender.  +Bowel sounds  Extremities: No edema      Lab Data Reviewed:     Recent Results (from the past 24 hour(s))   GLUCOSE, POC    Collection Time: 05/15/17 12:40 PM   Result Value Ref Range    Glucose (POC) 222 (H) 65 - 100 mg/dL    Performed by Johnnie Cullen Dr, POC    Collection Time: 05/15/17  4:31 PM   Result Value Ref Range    Glucose (POC) 172 (H) 65 - 100 mg/dL    Performed by Abrahan De Paz, POC    Collection Time: 05/15/17  9:24 PM   Result Value Ref Range    Glucose (POC) 190 (H) 65 - 100 mg/dL    Performed by Claudette Velázquez    GLUCOSE, POC    Collection Time: 17  6:27 AM   Result Value Ref Range    Glucose (POC) 149 (H) 65 - 100 mg/dL    Performed by Claudette Velázquez          ________________________________________________________________________       Assessment:   · HCV with cirrhosis  · History of small volume ascites with SBP for which she underwent treatment during 3/17 hospitalization  · Anemia  · Melena  · HIV/AIDS with low CD4 count, not on HAART     Patient Active Problem List   Diagnosis Code    Degenerative Joint Disease M19.90    Degenerative Disc Disease [id number]    Chronic hepatitis C without mention of hepatic coma B18.2    Insomnia G47.00    Allergic rhinitis, cause unspecified J30.9    HIV (human immunodeficiency virus infection) ([facility] Z21    Pulmonary nodules R91.8    Diabetes mellitus type 2, controlled ([facility] E11.9    Bruised ribs S18.12A    Sprain and strain of unspecified site of shoulder and upper arm S43.409A, S46.919A    Arthritis, Degenerative, Knee M17.10    HTN (hypertension) I10    Chronic airway obstruction, not elsewhere classified J44.9    COPD exacerbation (HCC) J44.1    Boils L02.92    Multiple open wounds of lower leg S81.809A    Generalized abdominal pain R10.84    Pancytopenia (HCC) D61.818    Cellulitis of right leg L03.115     Plan:   · IV access has not been obtained  · She is refusing labs and treatments  · No plan for EGD given above. OK to advance diet  · Supportive care     Signed By: Edu Kelley NP     5/16/2017  10:34 AM           GI Attending: Agree with above. Will hold off on EGD until IV access can be established. Will follow. Giovanni Ford MD

## 2017-05-16 NOTE — PROGRESS NOTES
Hospitalist Progress Note          Kalli Choudhary MD  Please call  and page for questions. Call physician on-call through the  7pm-7am    Daily Progress Note: 5/16/2017    Primary care provider:Shabbir Villaseñor MD    Date of admission: 5/10/2017  9:20 AM    Admission summery and hospital course:  76-year-old  woman with past medical history significant for HIV/AIDS who was diagnosed 20 years ago has lost to followup and not on HAART treatment currently, last CD4 count was 363 with a high viral load, chronic hepatitis C and liver cirrhosis, hypertension, degenerative joint disease, degenerative disk disease, diabetes   mellitus type 2, right lower leg ulcer, bilateral lower leg wound, possible vasculitis and anemia, who presented to [facility] Emergency Department with progressive pain and swelling of the right lower leg. Subjective:   Ms. Gonzalo Saini is feeling OK. Continued pain in her right leg wound. No more abdominal pain. Continued loose stools. Assessment/Plan:   Abdominal pain with dark color stool (POA) - no acute bleeding, stools are more brown than black  - CT 3/25 with cirrhosis and splenomegaly. Ascites.   - KUB 5/15 non-specific  - Prior FOBT 3/25 negative, recheck though with PO iron this may be a false positive  - Continue protonix  - Monitor HH  - GI consulted    Right lower leg infected ulcer with bilateral lower leg ulcers (POA)   - Cryoglobulin 5/11 qualitatively positive, checking confirmatory test  - Biopsy 3/28 with non-specific necroinflammatory crust, no vessels or deep tissue present  - Consider re-biopsy  - Continue linezolid started 5/14, ertapenem started 5/15   - Wound care  - ID consulted      HIV (chronic) - CD4 count 389, VL 359K  - not on HAART  - ID consulted     Liver cirrhosis - sec to Hep C, still with active infection  - Continue lactulose, propranolol, spironolactone  - Add rifaxamin    Anemia (chronic) - due to chronic blood loss, HH largely stable  - Monitor HH    Thrombocytopenia (chronic) - due to cirrhosis, monitor    DM type 2 - A1c 5.8  - continue SSI     HTN - stable, adjust meds as needed. Clonidine as PRN.      COPD - stable     See orders for other plans. VTE prophylaxis: Heparin  Code status: Full  Discussed plan of care with Patient/Family and Nurse. Pre-admission lived at home. Discharge planning: hopefully to home with continued wound care and abx at the infusion center,        Review of Systems:     Review of Systems:  Symptom  Y/N  Comments   Symptom  Y/N  Comments    Fever/Chills   n   Chest Pain  n    Poor Appetite   y   Edema   n    Cough  n   Abdominal Pain   n    Sputum  n   Joint Pain      SOB/HUNTLEY  n   Pruritis/Rash      Nausea/vomit  n   Tolerating PT/OT      Diarrhea     Tolerating Diet      Constipation  y   Other      Could not obtain due to:         Objective:   Physical Exam:     Visit Vitals    /72 (BP 1 Location: Left arm, BP Patient Position: At rest)    Pulse 68    Temp 97.8 °F (36.6 °C)    Resp 14    Ht 5' 6\" (1.676 m)    Wt 64.5 kg (142 lb 3 oz)  Comment: unable to stand at present time    SpO2 98%    BMI 22.95 kg/m2      O2 Device: Room air    Temp (24hrs), Av.7 °F (36.5 °C), Min:97.6 °F (36.4 °C), Max:97.8 °F (36.6 °C)               General:  Alert, cooperative, no distress, appears stated age. Lungs:   Clear to auscultation bilaterally. Chest wall:  No tenderness or deformity. Heart:  Regular rate and rhythm, S1, S2 normal, no murmur. Abdomen:   Soft, non-tender. Mildly distended, bowel sounds normal.    Extremities: Extremities normal, atraumatic, no cyanosis or edema. Skin: Wounds at the right leg dry.  DP and PT pulses 2+ in right goot   Neurologic: CNII-XII intact. + asterixis     Data Review:       Recent Days:  Recent Labs      05/15/17   0444  17   0359   WBC  3.7  3.9   HGB  8.9*  10.1*   HCT  27.5*  31.2*   PLT  127* 132*     Recent Labs      05/15/17   0444  05/14/17   0359   NA  136  135*   K  4.2  4.3   CL  109*  109*   CO2  19*  18*   GLU  145*  125*   BUN  16  16   CREA  0.90  0.87   CA  8.0*  8.2*   MG   --   1.7     No results for input(s): PH, PCO2, PO2, HCO3, FIO2 in the last 72 hours.     24 Hour Results:  Recent Results (from the past 24 hour(s))   GLUCOSE, POC    Collection Time: 05/15/17  4:31 PM   Result Value Ref Range    Glucose (POC) 172 (H) 65 - 100 mg/dL    Performed by Kendell Peraza, POC    Collection Time: 05/15/17  9:24 PM   Result Value Ref Range    Glucose (POC) 190 (H) 65 - 100 mg/dL    Performed by Bertha, POC    Collection Time: 05/16/17  6:27 AM   Result Value Ref Range    Glucose (POC) 149 (H) 65 - 100 mg/dL    Performed by Bertha, POC    Collection Time: 05/16/17 11:33 AM   Result Value Ref Range    Glucose (POC) 171 (H) 65 - 100 mg/dL    Performed by Yunier Ahuja        Problem List:  Problem List as of 5/16/2017  Date Reviewed: 5/10/2017          Codes Class Noted - Resolved    * (Principal)Cellulitis of right leg ICD-10-CM: L03.115  ICD-9-CM: 682.6  5/10/2017 - Present        Generalized abdominal pain ICD-10-CM: R10.84  ICD-9-CM: 789.07  3/25/2017 - Present        Pancytopenia ([facility] ICD-10-CM: I26.583  ICD-9-CM: 284.19  3/25/2017 - Present        Multiple open wounds of lower leg ICD-10-CM: K72.520C  ICD-9-CM: 894.0  12/3/2015 - Present        Boils ICD-10-CM: L02.92  ICD-9-CM: 680.9  1/15/2015 - Present        Chronic airway obstruction, not elsewhere classified (Chronic) ICD-10-CM: J44.9  ICD-9-CM: 876  12/23/2013 - Present        COPD exacerbation ([facility] ICD-10-CM: J44.1  ICD-9-CM: 491.21  12/23/2013 - Present        Degenerative Joint Disease ICD-10-CM: M19.90  ICD-9-CM: 715.90  7/21/2011 - Present        Degenerative Disc Disease ICD-10-CM: [id number]  ICD-9-CM: 722.6  7/21/2011 - Present        Chronic hepatitis C without mention of hepatic coma ICD-10-CM: B18.2  ICD-9-CM: 070.54  7/21/2011 - Present        Insomnia ICD-10-CM: G47.00  ICD-9-CM: 780.52  7/21/2011 - Present        Allergic rhinitis, cause unspecified ICD-10-CM: J30.9  ICD-9-CM: 477.9  7/21/2011 - Present        HIV (human immunodeficiency virus infection) ([place] [address].) ICD-10-CM: Z21  ICD-9-CM: Anne Terry  7/21/2011 - Present        Pulmonary nodules ICD-10-CM: R91.8  ICD-9-CM: 793.19  7/21/2011 - Present        Diabetes mellitus type 2, controlled ([place] [address].) ICD-10-CM: E11.9  ICD-9-CM: 250.00  7/21/2011 - Present        Bruised ribs ICD-10-CM: Q38.391J  ICD-9-CM: 922.1  7/21/2011 - Present        Sprain and strain of unspecified site of shoulder and upper arm ICD-10-CM: U10.705O, S46.919A  ICD-9-CM: 840.9  7/21/2011 - Present        Arthritis, Degenerative, Knee ICD-10-CM: M17.10  ICD-9-CM: 715.96  7/21/2011 - Present        HTN (hypertension) ICD-10-CM: I10  ICD-9-CM: 401.9  7/21/2011 - Present        RESOLVED: COPD exacerbation ([place] [address].) ICD-10-CM: J44.1  ICD-9-CM: 491.21  12/16/2013 - 12/23/2013              Medications reviewed  Current Facility-Administered Medications   Medication Dose Route Frequency    cloNIDine HCl (CATAPRES) tablet 0.1 mg  0.1 mg Oral Q4H PRN    ertapenem (INVANZ) 1 g in lidocaine (PF) (XYLOCAINE) 10 mg/mL (1 %) 3.2 mL injection  1 g IntraMUSCular DAILY    octreotide (SANDOSTATIN) 500 mcg in 0.9% sodium chloride 500 mL infusion  25 mcg/hr IntraVENous CONTINUOUS    pantoprazole (PROTONIX) tablet 40 mg  40 mg Oral ACB    linezolid (ZYVOX) tablet 600 mg  600 mg Oral Q12H    ferrous sulfate tablet 325 mg  325 mg Oral BID WITH MEALS    ascorbic acid (vitamin C) (VITAMIN C) tablet 500 mg  500 mg Oral DAILY    fish oil-omega-3 fatty acids 340-1,000 mg capsule 1 Cap  1 Cap Oral EVERY OTHER DAY    oxyCODONE IR (ROXICODONE) tablet 10 mg  10 mg Oral Q4H PRN    propranolol (INDERAL) tablet 10 mg  10 mg Oral BID    sodium chloride (NS) flush 5-10 mL  5-10 mL IntraVENous Q8H    sodium chloride (NS) flush 5-10 mL  5-10 mL IntraVENous PRN    heparin (porcine) injection 5,000 Units  5,000 Units SubCUTAneous Q8H    insulin lispro (HUMALOG) injection   SubCUTAneous AC&HS    glucose chewable tablet 16 g  4 Tab Oral PRN    dextrose (D50W) injection syrg 12.5-25 g  12.5-25 g IntraVENous PRN    glucagon (GLUCAGEN) injection 1 mg  1 mg IntraMUSCular PRN    albuterol-ipratropium (DUO-NEB) 2.5 MG-0.5 MG/3 ML  3 mL Nebulization Q4H PRN    spironolactone (ALDACTONE) tablet 25 mg  25 mg Oral DAILY    amLODIPine (NORVASC) tablet 10 mg  10 mg Oral DAILY    lactulose (CHRONULAC) solution 10 g  10 g Oral BID       Care Plan discussed with: Patient/Family and Nurse      Irma Strong MD

## 2017-05-17 LAB
ANION GAP BLD CALC-SCNC: 8 MMOL/L (ref 5–15)
BUN SERPL-MCNC: 18 MG/DL (ref 6–20)
BUN/CREAT SERPL: 16 (ref 12–20)
CALCIUM SERPL-MCNC: 7.9 MG/DL (ref 8.5–10.1)
CHLORIDE SERPL-SCNC: 109 MMOL/L (ref 97–108)
CO2 SERPL-SCNC: 21 MMOL/L (ref 21–32)
CREAT SERPL-MCNC: 1.11 MG/DL (ref 0.55–1.02)
ERYTHROCYTE [DISTWIDTH] IN BLOOD BY AUTOMATED COUNT: 24.3 % (ref 11.5–14.5)
GLUCOSE BLD STRIP.AUTO-MCNC: 138 MG/DL (ref 65–100)
GLUCOSE BLD STRIP.AUTO-MCNC: 155 MG/DL (ref 65–100)
GLUCOSE BLD STRIP.AUTO-MCNC: 158 MG/DL (ref 65–100)
GLUCOSE BLD STRIP.AUTO-MCNC: 258 MG/DL (ref 65–100)
GLUCOSE SERPL-MCNC: 131 MG/DL (ref 65–100)
HCT VFR BLD AUTO: 30 % (ref 35–47)
HGB BLD-MCNC: 9.7 G/DL (ref 11.5–16)
MCH RBC QN AUTO: 27.7 PG (ref 26–34)
MCHC RBC AUTO-ENTMCNC: 32.3 G/DL (ref 30–36.5)
MCV RBC AUTO: 85.7 FL (ref 80–99)
PLATELET # BLD AUTO: 146 K/UL (ref 150–400)
POTASSIUM SERPL-SCNC: 4.2 MMOL/L (ref 3.5–5.1)
RBC # BLD AUTO: 3.5 M/UL (ref 3.8–5.2)
SERVICE CMNT-IMP: ABNORMAL
SODIUM SERPL-SCNC: 138 MMOL/L (ref 136–145)
WBC # BLD AUTO: 4 K/UL (ref 3.6–11)

## 2017-05-17 PROCEDURE — 36415 COLL VENOUS BLD VENIPUNCTURE: CPT | Performed by: HOSPITALIST

## 2017-05-17 PROCEDURE — 74011250637 HC RX REV CODE- 250/637: Performed by: HOSPITALIST

## 2017-05-17 PROCEDURE — 74011250636 HC RX REV CODE- 250/636: Performed by: INTERNAL MEDICINE

## 2017-05-17 PROCEDURE — 85027 COMPLETE CBC AUTOMATED: CPT | Performed by: HOSPITALIST

## 2017-05-17 PROCEDURE — 74011000250 HC RX REV CODE- 250: Performed by: INTERNAL MEDICINE

## 2017-05-17 PROCEDURE — 74011250636 HC RX REV CODE- 250/636: Performed by: HOSPITALIST

## 2017-05-17 PROCEDURE — 74011636637 HC RX REV CODE- 636/637: Performed by: HOSPITALIST

## 2017-05-17 PROCEDURE — 74011250637 HC RX REV CODE- 250/637: Performed by: INTERNAL MEDICINE

## 2017-05-17 PROCEDURE — 80048 BASIC METABOLIC PNL TOTAL CA: CPT | Performed by: HOSPITALIST

## 2017-05-17 PROCEDURE — 82962 GLUCOSE BLOOD TEST: CPT

## 2017-05-17 PROCEDURE — 65270000029 HC RM PRIVATE

## 2017-05-17 RX ADMIN — AMLODIPINE BESYLATE 10 MG: 5 TABLET ORAL at 08:24

## 2017-05-17 RX ADMIN — INSULIN LISPRO 2 UNITS: 100 INJECTION, SOLUTION INTRAVENOUS; SUBCUTANEOUS at 07:18

## 2017-05-17 RX ADMIN — PROPRANOLOL HYDROCHLORIDE 10 MG: 10 TABLET ORAL at 08:23

## 2017-05-17 RX ADMIN — RIFAXIMIN 550 MG: 550 TABLET ORAL at 17:28

## 2017-05-17 RX ADMIN — LIDOCAINE HYDROCHLORIDE 1 G: 10 INJECTION, SOLUTION EPIDURAL; INFILTRATION; INTRACAUDAL; PERINEURAL at 19:50

## 2017-05-17 RX ADMIN — Medication 325 MG: at 17:28

## 2017-05-17 RX ADMIN — Medication 325 MG: at 07:19

## 2017-05-17 RX ADMIN — OXYCODONE HYDROCHLORIDE 10 MG: 5 TABLET ORAL at 11:57

## 2017-05-17 RX ADMIN — LACTULOSE 20 G: 20 SOLUTION ORAL at 17:28

## 2017-05-17 RX ADMIN — DOXYCYCLINE HYCLATE 100 MG: 100 TABLET ORAL at 08:24

## 2017-05-17 RX ADMIN — INSULIN LISPRO 5 UNITS: 100 INJECTION, SOLUTION INTRAVENOUS; SUBCUTANEOUS at 17:28

## 2017-05-17 RX ADMIN — HEPARIN SODIUM 5000 UNITS: 5000 INJECTION, SOLUTION INTRAVENOUS; SUBCUTANEOUS at 22:11

## 2017-05-17 RX ADMIN — Medication 1 CAPSULE: at 07:19

## 2017-05-17 RX ADMIN — RIFAXIMIN 550 MG: 550 TABLET ORAL at 08:24

## 2017-05-17 RX ADMIN — PANTOPRAZOLE SODIUM 40 MG: 40 TABLET, DELAYED RELEASE ORAL at 07:19

## 2017-05-17 RX ADMIN — HEPARIN SODIUM 5000 UNITS: 5000 INJECTION, SOLUTION INTRAVENOUS; SUBCUTANEOUS at 14:39

## 2017-05-17 RX ADMIN — LACTULOSE 20 G: 20 SOLUTION ORAL at 08:23

## 2017-05-17 RX ADMIN — OXYCODONE HYDROCHLORIDE 10 MG: 5 TABLET ORAL at 00:29

## 2017-05-17 RX ADMIN — PROPRANOLOL HYDROCHLORIDE 10 MG: 10 TABLET ORAL at 17:28

## 2017-05-17 RX ADMIN — OXYCODONE HYDROCHLORIDE AND ACETAMINOPHEN 500 MG: 500 TABLET ORAL at 08:24

## 2017-05-17 RX ADMIN — OXYCODONE HYDROCHLORIDE 10 MG: 5 TABLET ORAL at 07:25

## 2017-05-17 RX ADMIN — SPIRONOLACTONE 25 MG: 25 TABLET, FILM COATED ORAL at 08:24

## 2017-05-17 RX ADMIN — OXYCODONE HYDROCHLORIDE 10 MG: 5 TABLET ORAL at 21:40

## 2017-05-17 RX ADMIN — DOXYCYCLINE HYCLATE 100 MG: 100 TABLET ORAL at 21:40

## 2017-05-17 RX ADMIN — HEPARIN SODIUM 5000 UNITS: 5000 INJECTION, SOLUTION INTRAVENOUS; SUBCUTANEOUS at 07:19

## 2017-05-17 RX ADMIN — OXYCODONE HYDROCHLORIDE 10 MG: 5 TABLET ORAL at 17:28

## 2017-05-17 NOTE — PROGRESS NOTES
Bedside and Verbal shift change report given to Vilma Middleton RN (oncoming nurse) by Maria Elena Cotton RN (offgoing nurse). Report given with SBAR and Kardex.

## 2017-05-17 NOTE — CONSULTS
[id number] [facility] [address] Surgery at [address] [facility] Consultation    Admit Date: 5/10/2017  Reason for Consultation: Biopsy leg lesion to rule out cryoglobulinemic vasculitis and kaposi's sarcoma, biopsy in March non-diagnostiv    HPI:  [place] is a 60 y.o. female whom we are asked to see in consultation to obtain biopsy of lower leg lesion to r/o cryoglobulinemic vasculitis and kaposi's sarcoma. She states that lesions have been present in her lower legs and feet since January. She previously had punch biopsy of the right calf done by Dr. Camille Fuchs on 3/28/17. Results showed non-specific necroinflammatory crust with scant viable dermis. AFB and GMS stains negative for acid fast bacilli and fungal organisms. No vessels or subcutaneous tissue was present for the assessment of erythema nodosum. She is HIV +, diagnosed approximately 20 years ago, along with hx of HCV and cirrhosis. Current cd4 is 389 with viral load of 359,000. She is also having dark stools which are heme +, for which GI is following. At present she is not interested in having biopsy done of her lower extremity. Explained to her that we could do it with local anesthetic at the bedside vs under sedation in the OR. She said she would think about it.        Patient Active Problem List    Diagnosis Date Noted    Cellulitis of right leg 05/10/2017    Generalized abdominal pain 03/25/2017    Pancytopenia ([address] 03/25/2017    Multiple open wounds of lower leg 12/03/2015    Boils 01/15/2015    Chronic airway obstruction, not elsewhere classified 12/23/2013    COPD exacerbation ([address] 12/23/2013    Degenerative Joint Disease 07/21/2011    Degenerative Disc Disease 07/21/2011    Chronic hepatitis C without mention of hepatic coma 07/21/2011    Insomnia 07/21/2011    Allergic rhinitis, cause unspecified 07/21/2011    HIV (human immunodeficiency virus infection) ([address] 07/21/2011    Pulmonary nodules 07/21/2011    Diabetes mellitus type 2, controlled ([facility] 07/21/2011    Bruised ribs 07/21/2011    Sprain and strain of unspecified site of shoulder and upper arm 07/21/2011    Arthritis, Degenerative, Knee 07/21/2011    HTN (hypertension) 07/21/2011     Past Medical History:   Diagnosis Date    Allergic rhinitis, cause unspecified 7/21/2011    Arthritis 7/21/2011    Asthma     Burning with urination     Chronic hepatitis C without mention of hepatic coma 7/21/2011    Degenerative Disc Disease 7/21/2011    Degenerative Joint Disease 7/21/2011    Diabetes ([facility]     Type II    HIV (human immunodeficiency virus infection) ([facility] 7/21/2011    Hypertension     Insomnia 7/21/2011    Multiple open wounds of lower leg 12/3/2015    Pulmonary nodules 7/21/2011      Past Surgical History:   Procedure Laterality Date    HX DILATION AND CURETTAGE  1970    HX GYN  1998    partial hystecomy    HX ORTHOPAEDIC      pins/screws in left leg    HX TUBAL LIGATION        Social History   Substance Use Topics    Smoking status: Current Every Day Smoker     Packs/day: 0.50     Years: 15.00     Types: Cigarettes    Smokeless tobacco: Never Used    Alcohol use No      Family History   Problem Relation Age of Onset    Diabetes Mother     Hypertension Mother     Diabetes Father       Prior to Admission medications    Medication Sig Start Date End Date Taking? Authorizing Provider   spironolactone (ALDACTONE) 25 mg tablet Take 25 mg by mouth daily. Yes Historical Provider   amLODIPine (NORVASC) 10 mg tablet Take 10 mg by mouth daily. Yes Historical Provider   insulin glulisine (APIDRA) 100 unit/mL injection 10 Units by SubCUTAneous route Daily (before breakfast). Yes Historical Provider   lactulose (CHRONULAC) 10 gram/15 mL solution Take 10 g by mouth two (2) times a day. Yes Historical Provider   ferrous sulfate 325 mg (65 mg iron) tablet Take 1 Tab by mouth two (2) times daily (with meals).  3/30/17  Yes Isi Mitchell MD oxyCODONE IR (ROXICODONE) 10 mg tab immediate release tablet Take 1 Tab by mouth every four (4) hours as needed. Max Daily Amount: 60 mg. 3/30/17  Yes Anjel Segovia MD   propranolol (INDERAL) 10 mg tablet Take 1 Tab by mouth two (2) times a day. 3/30/17  Yes Anjel Segovia MD   insulin lispro (HUMALOG KWIKPEN) 100 unit/mL kwikpen 1 Units by SubCUTAneous route Before breakfast, lunch, dinner and at bedtime. 150-200=2 nits  201-250=4 units  251-300=6 units  301-350=8 units  351-400=10 units  More than 400 call MD 3/30/17  Yes Anjel Segovia MD   [address] [place] [address] Drive 18 mcg inhalation capsule inhale the contents of one capsule in the handihaler once daily 10/25/15  Yes Juan Bruner MD   albuterol (PROVENTIL HFA, VENTOLIN HFA, PROAIR HFA) 90 mcg/actuation inhaler Take 2 puffs by inhalation every four (4) hours as needed for Wheezing. 1/15/15  Yes Juan Bruner MD   fluticasone-salmeterol (ADVAIR) 250-50 mcg/dose diskus inhaler Take 1 Puff by inhalation two (2) times a day. 8/1/14  Yes Juan Bruner MD   omega-3 fatty acids-vitamin e (FISH OIL) 1,000 mg Cap Take 1 Cap by mouth every other day. Yes Historical Provider   ascorbic acid (VITAMIN C) 1,000 mg tablet Take 1,000 mg by mouth every other day. Yes Historical Provider   triamcinolone (ARISTOCORT) 0.5 % topical cream Apply  to affected area two (2) times a day.  use thin layer 11/23/15   Caitlyn Potter MD     Current Facility-Administered Medications   Medication Dose Route Frequency    lactulose (CHRONULAC) solution 20 g  20 g Oral BID    rifAXIMin (XIFAXAN) tablet 550 mg  550 mg Oral BID    dextrose 10 % infusion 125-250 mL  125-250 mL IntraVENous PRN    doxycycline (VIBRA-TABS) tablet 100 mg  100 mg Oral Q12H    cloNIDine HCl (CATAPRES) tablet 0.1 mg  0.1 mg Oral Q4H PRN    ertapenem (INVANZ) 1 g in lidocaine (PF) (XYLOCAINE) 10 mg/mL (1 %) 3.2 mL injection  1 g IntraMUSCular DAILY    octreotide (SANDOSTATIN) 500 mcg in 0.9% sodium chloride 500 mL infusion  25 mcg/hr IntraVENous CONTINUOUS    pantoprazole (PROTONIX) tablet 40 mg  40 mg Oral ACB    ferrous sulfate tablet 325 mg  325 mg Oral BID WITH MEALS    ascorbic acid (vitamin C) (VITAMIN C) tablet 500 mg  500 mg Oral DAILY    fish oil-omega-3 fatty acids 340-1,000 mg capsule 1 Cap  1 Cap Oral EVERY OTHER DAY    oxyCODONE IR (ROXICODONE) tablet 10 mg  10 mg Oral Q4H PRN    propranolol (INDERAL) tablet 10 mg  10 mg Oral BID    sodium chloride (NS) flush 5-10 mL  5-10 mL IntraVENous Q8H    sodium chloride (NS) flush 5-10 mL  5-10 mL IntraVENous PRN    heparin (porcine) injection 5,000 Units  5,000 Units SubCUTAneous Q8H    insulin lispro (HUMALOG) injection   SubCUTAneous AC&HS    glucose chewable tablet 16 g  4 Tab Oral PRN    glucagon (GLUCAGEN) injection 1 mg  1 mg IntraMUSCular PRN    albuterol-ipratropium (DUO-NEB) 2.5 MG-0.5 MG/3 ML  3 mL Nebulization Q4H PRN    spironolactone (ALDACTONE) tablet 25 mg  25 mg Oral DAILY    amLODIPine (NORVASC) tablet 10 mg  10 mg Oral DAILY     Allergies   Allergen Reactions    Codeine Itching     Patient denies Codeine allergy    Penicillin V Hives     Patient denies penicillin allergy. Tolerated Merrem    Pollen Extracts Runny Nose     Itchy, headaches, sneezing          Subjective:     Review of Systems:    A comprehensive review of systems was negative except for that written in the History of Present Illness. Objective:     Blood pressure 145/76, pulse 77, temperature 97 °F (36.1 °C), resp. rate 15, height 5' 6\" (1.676 m), weight 64.5 kg (142 lb 3 oz), SpO2 100 %.   Temp (24hrs), Av.3 °F (36.3 °C), Min:97 °F (36.1 °C), Max:97.8 °F (36.6 °C)      Recent Labs      17   0355  05/15/17   0444   WBC  4.0  3.7   HGB  9.7*  8.9*   HCT  30.0*  27.5*   PLT  146*  127*     Recent Labs      17   0355  05/15/17   0444   NA  138  136   K  4.2  4.2   CL  109*  109*   CO2  21  19*   GLU  131*  145*   BUN 18  16   CREA  1.11*  0.90   CA  7.9*  8.0*     No results for input(s): AML, LPSE in the last 72 hours. No intake or output data in the 24 hours ending 05/17/17 1306     _____________________  Physical Exam:     General:  Alert, cooperative, no distress, appears stated age. Eyes:   PERRL, EOMs intact. Sclera clear. Throat: Lips, mucosa, and tongue normal.   Neck: Supple, symmetrical, trachea midline. Lungs:   Clear to auscultation bilaterally. Heart:  Regular rate and rhythm. Lower Extremity: Ulceration present on lateral RLE with tan slough and some pink tissue in base which is extremely tender. She has 3 areas of purple discoloration along the lateral aspect of her foot. Left LE also with ulceration, but this is scabbed over. Purple areas of discoloration present on the left LE as well. Assessment:   Principal Problem:    Cellulitis of right leg (5/10/2017)    Active Problems:    HIV (human immunodeficiency virus infection) ([facility] (7/21/2011)      Diabetes mellitus type 2, controlled ([facility] (7/21/2011)      HTN (hypertension) (7/21/2011)      Pancytopenia ([facility] (3/25/2017)    bilateral LE leg lesions        Plan:     D/W Dr. Marilyn Wilder, because previous punch was non-diagnostic, he is requesting surgical biopsy to obtain larger tissue sample. At present, she is declining to have biopsy done, but states she will think about it. Will have follow-up discussion about possible biopsy with surgeon. Thank you for allowing us to participate in the care of this patient. Total time spent with patient: 20 minutes. Signed By: Claudia Pierson NP     May 17, 2017      Pt seen and examined  Agree with above  She still has not made a decision regarding whether or not she would go through with a biopsy. She states that she is waiting for her daughter to come and so they can discuss it  Please let us know if she is going to agree.

## 2017-05-17 NOTE — PROGRESS NOTES
Hospitalist Progress Note          Yoselin Hector MD  Please call  and page for questions. Call physician on-call through the  7pm-7am    Daily Progress Note: 5/17/2017    Primary care provider:Shabbir Saldivar MD    Date of admission: 5/10/2017  9:20 AM    Admission summery and hospital course:  79-year-old  woman with past medical history significant for HIV/AIDS who was diagnosed 20 years ago has lost to followup and not on HAART treatment currently, last CD4 count was 363 with a high viral load, chronic hepatitis C and liver cirrhosis, hypertension, degenerative joint disease, degenerative disk disease, diabetes   mellitus type 2, right lower leg ulcer, bilateral lower leg wound, possible vasculitis and anemia, who presented to [facility] with progressive pain and swelling of the right lower leg. Subjective:   Ms. Cony Martínez is about the same. Leg wound continues to sting. Assessment/Plan:   Abdominal pain with dark color stool (POA) - no acute bleeding, HH is improving  - CT 3/25 with cirrhosis and splenomegaly. Ascites. - KUB 5/15 non-specific  - Prior FOBT 3/25 negative, recheck positive, though with PO iron this may be a false positive  - Continue protonix  - Monitor HH  - GI consulted - agree that without an IV, an EGD is ill-advised    Right lower leg infected ulcer with bilateral lower leg ulcers (POA) - infection, though cryoglobulinemia is in the mix  - Cryoglobulin 5/11 qualitatively positive, checking confirmatory test  - Biopsy 3/28 with non-specific necroinflammatory crust, no vessels or deep tissue present  - Consider re-biopsy  - Continue ertapenem started 5/15.  Doxycycline started 5/16  - Wound care  - Surgery consulted for biopsy to rule out cryoglobulinemic vasculitis vs kaposi's sarcoma - patient wants to discuss with her daughter before agreeing  - ID consulted      HIV (chronic) - CD4 count 389, VL 359K  - not on HAART  - ID consulted     Liver cirrhosis - sec to Hep C, still with active infection  - Continue lactulose, propranolol, spironolactone  - Added rifaxamin    Anemia (chronic) - due to chronic blood loss, HH coming up  - Monitor HH    Thrombocytopenia (chronic) - due to cirrhosis, monitor    DM type 2 - A1c 5.8, likely falsely low in the setting of anemia  - continue SSI     HTN - stable, adjust meds as needed. Clonidine as PRN.      COPD - stable     See orders for other plans. VTE prophylaxis: Heparin  Code status: Full  Discussed plan of care with Patient/Family and Nurse. Pre-admission lived at home. Discharge planning: hopefully to home with continued wound care and IM abx at the infusion center, possibly tomorrow       Review of Systems:     Review of Systems:  Symptom  Y/N  Comments   Symptom  Y/N  Comments    Fever/Chills   n   Chest Pain  n    Poor Appetite   y   Edema   n    Cough  n   Abdominal Pain   n    Sputum  n   Joint Pain      SOB/HUNTLEY  n   Pruritis/Rash      Nausea/vomit  n   Tolerating PT/OT      Diarrhea     Tolerating Diet      Constipation  y   Other      Could not obtain due to:         Objective:   Physical Exam:     Visit Vitals    /79 (BP 1 Location: Right arm, BP Patient Position: At rest)    Pulse 74    Temp 97.1 °F (36.2 °C)    Resp 16    Ht 5' 6\" (1.676 m)    Wt 64.5 kg (142 lb 3 oz)  Comment: unable to stand at present time    SpO2 100%    BMI 22.95 kg/m2      O2 Device: Room air    Temp (24hrs), Av.2 °F (36.2 °C), Min:97 °F (36.1 °C), Max:97.7 °F (36.5 °C)               General:  Alert, cooperative, no distress, appears stated age. Lungs:   Clear to auscultation bilaterally. Chest wall:  No tenderness or deformity. Heart:  Regular rate and rhythm, S1, S2 normal, no murmur. Abdomen:   Soft, non-tender. Mildly distended, bowel sounds normal.    Extremities: Extremities normal, atraumatic, no cyanosis or edema.    Skin: Wounds at the right leg dry. DP and PT pulses 2+ in right goot   Neurologic: CNII-XII intact. + asterixis     Data Review:       Recent Days:  Recent Labs      05/17/17   0355  05/15/17   0444   WBC  4.0  3.7   HGB  9.7*  8.9*   HCT  30.0*  27.5*   PLT  146*  127*     Recent Labs      05/17/17   0355  05/15/17   0444   NA  138  136   K  4.2  4.2   CL  109*  109*   CO2  21  19*   GLU  131*  145*   BUN  18  16   CREA  1.11*  0.90   CA  7.9*  8.0*     No results for input(s): PH, PCO2, PO2, HCO3, FIO2 in the last 72 hours.     24 Hour Results:  Recent Results (from the past 24 hour(s))   GLUCOSE, POC    Collection Time: 05/16/17  4:30 PM   Result Value Ref Range    Glucose (POC) 282 (H) 65 - 100 mg/dL    Performed by Earl Mendez    OCCULT BLOOD, STOOL    Collection Time: 05/16/17  8:48 PM   Result Value Ref Range    Occult blood, stool POSITIVE (A) NEG     GLUCOSE, POC    Collection Time: 05/16/17  9:03 PM   Result Value Ref Range    Glucose (POC) 160 (H) 65 - 100 mg/dL    Performed by Earl Mendez    CBC W/O DIFF    Collection Time: 05/17/17  3:55 AM   Result Value Ref Range    WBC 4.0 3.6 - 11.0 K/uL    RBC 3.50 (L) 3.80 - 5.20 M/uL    HGB 9.7 (L) 11.5 - 16.0 g/dL    HCT 30.0 (L) 35.0 - 47.0 %    MCV 85.7 80.0 - 99.0 FL    MCH 27.7 26.0 - 34.0 PG    MCHC 32.3 30.0 - 36.5 g/dL    RDW 24.3 (H) 11.5 - 14.5 %    PLATELET 166 (L) 865 - 565 K/uL   METABOLIC PANEL, BASIC    Collection Time: 05/17/17  3:55 AM   Result Value Ref Range    Sodium 138 136 - 145 mmol/L    Potassium 4.2 3.5 - 5.1 mmol/L    Chloride 109 (H) 97 - 108 mmol/L    CO2 21 21 - 32 mmol/L    Anion gap 8 5 - 15 mmol/L    Glucose 131 (H) 65 - 100 mg/dL    BUN 18 6 - 20 MG/DL    Creatinine 1.11 (H) 0.55 - 1.02 MG/DL    BUN/Creatinine ratio 16 12 - 20      GFR est AA >60 >60 ml/min/1.73m2    GFR est non-AA 50 (L) >60 ml/min/1.73m2    Calcium 7.9 (L) 8.5 - 10.1 MG/DL   GLUCOSE, POC    Collection Time: 05/17/17  6:33 AM   Result Value Ref Range    Glucose (POC) 155 (H) 65 - 100 mg/dL    Performed by Quentin Norwood    GLUCOSE, POC    Collection Time: 05/17/17 11:40 AM   Result Value Ref Range    Glucose (POC) 138 (H) 65 - 100 mg/dL    Performed by Lianet Corporal        Problem List:  Problem List as of 5/17/2017  Date Reviewed: 5/10/2017          Codes Class Noted - Resolved    * (Principal)Cellulitis of right leg ICD-10-CM: L03.115  ICD-9-CM: 682.6  5/10/2017 - Present        Generalized abdominal pain ICD-10-CM: R10.84  ICD-9-CM: 789.07  3/25/2017 - Present        Pancytopenia ([facility] ICD-10-CM: G19.355  ICD-9-CM: 284.19  3/25/2017 - Present        Multiple open wounds of lower leg ICD-10-CM: J54.113O  ICD-9-CM: 894.0  12/3/2015 - Present        Boils ICD-10-CM: L02.92  ICD-9-CM: 680.9  1/15/2015 - Present        Chronic airway obstruction, not elsewhere classified (Chronic) ICD-10-CM: J44.9  ICD-9-CM: 773  12/23/2013 - Present        COPD exacerbation ([facility] ICD-10-CM: J44.1  ICD-9-CM: 491.21  12/23/2013 - Present        Degenerative Joint Disease ICD-10-CM: M19.90  ICD-9-CM: 715.90  7/21/2011 - Present        Degenerative Disc Disease ICD-10-CM: [id number]  ICD-9-CM: 722.6  7/21/2011 - Present        Chronic hepatitis C without mention of hepatic coma ICD-10-CM: B18.2  ICD-9-CM: 070.54  7/21/2011 - Present        Insomnia ICD-10-CM: G47.00  ICD-9-CM: 780.52  7/21/2011 - Present        Allergic rhinitis, cause unspecified ICD-10-CM: J30.9  ICD-9-CM: 477.9  7/21/2011 - Present        HIV (human immunodeficiency virus infection) ([facility] ICD-10-CM: Z21  ICD-9-CM: V08  7/21/2011 - Present        Pulmonary nodules ICD-10-CM: R91.8  ICD-9-CM: 793.19  7/21/2011 - Present        Diabetes mellitus type 2, controlled ([facility] ICD-10-CM: E11.9  ICD-9-CM: 250.00  7/21/2011 - Present        Bruised ribs ICD-10-CM: R71.677G  ICD-9-CM: 922.1  7/21/2011 - Present        Sprain and strain of unspecified site of shoulder and upper arm ICD-10-CM: J40.697A, E15.883U  ICD-9-CM: 840.9  7/21/2011 - Present Arthritis, Degenerative, Knee ICD-10-CM: M17.10  ICD-9-CM: 715.96  7/21/2011 - Present        HTN (hypertension) ICD-10-CM: I10  ICD-9-CM: 401.9  7/21/2011 - Present        RESOLVED: COPD exacerbation ([facility] ICD-10-CM: J44.1  ICD-9-CM: 491.21  12/16/2013 - 12/23/2013              Medications reviewed  Current Facility-Administered Medications   Medication Dose Route Frequency    lactulose (CHRONULAC) solution 20 g  20 g Oral BID    rifAXIMin (XIFAXAN) tablet 550 mg  550 mg Oral BID    dextrose 10 % infusion 125-250 mL  125-250 mL IntraVENous PRN    doxycycline (VIBRA-TABS) tablet 100 mg  100 mg Oral Q12H    cloNIDine HCl (CATAPRES) tablet 0.1 mg  0.1 mg Oral Q4H PRN    ertapenem (INVANZ) 1 g in lidocaine (PF) (XYLOCAINE) 10 mg/mL (1 %) 3.2 mL injection  1 g IntraMUSCular DAILY    pantoprazole (PROTONIX) tablet 40 mg  40 mg Oral ACB    ferrous sulfate tablet 325 mg  325 mg Oral BID WITH MEALS    ascorbic acid (vitamin C) (VITAMIN C) tablet 500 mg  500 mg Oral DAILY    fish oil-omega-3 fatty acids 340-1,000 mg capsule 1 Cap  1 Cap Oral EVERY OTHER DAY    oxyCODONE IR (ROXICODONE) tablet 10 mg  10 mg Oral Q4H PRN    propranolol (INDERAL) tablet 10 mg  10 mg Oral BID    sodium chloride (NS) flush 5-10 mL  5-10 mL IntraVENous Q8H    sodium chloride (NS) flush 5-10 mL  5-10 mL IntraVENous PRN    heparin (porcine) injection 5,000 Units  5,000 Units SubCUTAneous Q8H    insulin lispro (HUMALOG) injection   SubCUTAneous AC&HS    glucose chewable tablet 16 g  4 Tab Oral PRN    glucagon (GLUCAGEN) injection 1 mg  1 mg IntraMUSCular PRN    albuterol-ipratropium (DUO-NEB) 2.5 MG-0.5 MG/3 ML  3 mL Nebulization Q4H PRN    spironolactone (ALDACTONE) tablet 25 mg  25 mg Oral DAILY    amLODIPine (NORVASC) tablet 10 mg  10 mg Oral DAILY       Care Plan discussed with: Patient/Family and Nurse      Carmelina Beth MD

## 2017-05-17 NOTE — PROGRESS NOTES
Bedside shift change report given to Margot Cano RN (oncoming nurse) by Tenzin Marino RN (offgoing nurse). Report given with SBAR, Kardex, Intake/Output and MAR.

## 2017-05-17 NOTE — PROGRESS NOTES
Spiritual Care Assessment/Progress Notes    Mariel Hernández [id number]  [id number]    1955  60 y.o.  female    Patient Telephone Number: [phone] (home)   Synagogue Affiliation: No Christian   Language: English   Extended Emergency Contact Information  Primary Emergency Contact: Martha Rose sourav Phone: [phone]  Mobile Phone: [phone]  Relation: Child   Patient Active Problem List    Diagnosis Date Noted    Cellulitis of right leg 05/10/2017    Generalized abdominal pain 03/25/2017    Pancytopenia ([facility] 03/25/2017    Multiple open wounds of lower leg 12/03/2015    Boils 01/15/2015    Chronic airway obstruction, not elsewhere classified 12/23/2013    COPD exacerbation ([facility] 12/23/2013    Degenerative Joint Disease 07/21/2011    Degenerative Disc Disease 07/21/2011    Chronic hepatitis C without mention of hepatic coma 07/21/2011    Insomnia 07/21/2011    Allergic rhinitis, cause unspecified 07/21/2011    HIV (human immunodeficiency virus infection) ([facility] 07/21/2011    Pulmonary nodules 07/21/2011    Diabetes mellitus type 2, controlled ([facility] 07/21/2011    Bruised ribs 07/21/2011    Sprain and strain of unspecified site of shoulder and upper arm 07/21/2011    Arthritis, Degenerative, Knee 07/21/2011    HTN (hypertension) 07/21/2011        Date: 5/17/2017       Level of Synagogue/Spiritual Activity:  []         Involved in sera tradition/spiritual practice    []         Not involved in sera tradition/spiritual practice  [x]         Spiritually oriented    []         Claims no spiritual orientation    []         seeking spiritual identity  []         Feels alienated from Roman Catholic practice/tradition  []         Feels angry about Roman Catholic practice/tradition  [x]         Spirituality/Roman Catholic tradition is a resource for coping at this time.   []         Not able to assess due to medical condition    Services Provided Today:  []         crisis intervention    []         reading Scriptures  [x]         spiritual assessment    [x]         prayer  [x]         empathic listening/emotional support  []         rites and rituals (cite in comments)  []         life review     []         Quaker support  []         theological development   []         advocacy  []         ethical dialog     []         blessing  []         bereavement support    []         support to family  []         anticipatory grief support   []         help with AMD  []         spiritual guidance    []         meditation      Spiritual Care Needs  [x]         Emotional Support  [x]         Spiritual/Judaism Care  []         Loss/Adjustment  []         Advocacy/Referral                /Ethics  []         No needs expressed at               this time  []         Other: (note in               comments)  Spiritual Care Plan  []         Follow up visits with               pt/family  []         Provide materials  []         Schedule sacraments  []         Contact Community               Clergy  [x]         Follow up as needed  []         Other: (note in               comments)      Responded to spiritual care consult. Led patient in processing. Patient opened up about death of sister about a year ago that impacted her. Added that she hadn't been able to really talk about it and still didn't know details. Went on to state that she felt that her family acted like they were just waiting for pt to go next. Explored what that meant. Patient admitted that family has been checking on her and several came to visit for mother's day, but she went on to say that they act differently when patient goes back home. Pt identified as someone of sera, and shared that she felt her discouragement may be attacks from the adversary as she has been trying to go back to her home [employer]ec[employer]. Shared about personal battles with cigarette smoking. Visit was interrupted by page.  Patient expressed a feeling of relief after sharing with  and stated that she appreciates having someone to talk to.  asked if patient would like continued follow up visits, to which pt agreed. Left business card and prayed with patient. CORTNEY Burkett. Beck Juarez

## 2017-05-17 NOTE — PROGRESS NOTES
ID Progress Note  2017       vanco  Discontinued  merrem  Discontinued   levaquin discontinued  linezolid  Discontinued  Ertapenem  doxy    Subjective:     Afebrile. Has some leg pain. No dyspnea. Objective:     Vitals:   Visit Vitals    /76    Pulse 77    Temp 97 °F (36.1 °C)    Resp 15    Ht 5' 6\" (1.676 m)    Wt 64.5 kg (142 lb 3 oz)  Comment: unable to stand at present time    SpO2 100%    BMI 22.95 kg/m2        Tmax:  Temp (24hrs), Av.4 °F (36.3 °C), Min:97 °F (36.1 °C), Max:97.8 °F (36.6 °C)      Exam:    Not in distress  Lungs clear  RRR  Abdomen soft  Bilateral legs have some scabs. There is no louis cellulitis. Labs:   Lab Results   Component Value Date/Time    WBC 4.0 2017 03:55 AM    Hemoglobin (POC) 14.1 2012 05:07 PM    HGB 9.7 2017 03:55 AM    HCT 30.0 2017 03:55 AM    PLATELET 037  03:55 AM    MCV 85.7 2017 03:55 AM     Lab Results   Component Value Date/Time    Sodium 138 2017 03:55 AM    Potassium 4.2 2017 03:55 AM    Chloride 109 2017 03:55 AM    CO2 21 2017 03:55 AM    Anion gap 8 2017 03:55 AM    Glucose 131 2017 03:55 AM    BUN 18 2017 03:55 AM    Creatinine 1.11 2017 03:55 AM    BUN/Creatinine ratio 16 2017 03:55 AM    GFR est AA >60 2017 03:55 AM    GFR est non-AA 50 2017 03:55 AM    Calcium 7.9 2017 03:55 AM    Bilirubin, total 0.6 2017 06:31 AM    AST (SGOT) 88 2017 06:31 AM    Alk. phosphatase 96 2017 06:31 AM    Protein, total 7.6 2017 06:31 AM    Albumin 1.6 2017 06:31 AM    Globulin 6.0 2017 06:31 AM    A-G Ratio 0.3 2017 06:31 AM    ALT (SGPT) 69 2017 06:31 AM           Assessment:       1. Human immunodeficiency virus. 2. Hepatitis C with liver cirrhosis. 3. Bilateral leg ulcers. 4. Diabetes.       Recommendations:     cd4 is 389 with viral load of 359,000.  She does not need prophylaxis for OIs. She will need treatment but this should started as an outpatient as a test for her compliance. Note of concern for Kaposi. Biopsy in march 2017 did not reveal this. Her current cd4 count also makes this less likely. Continue ertapenem and doxy  .      El Gonzalez MD

## 2017-05-17 NOTE — ROUTINE PROCESS
Bedside and Verbal shift change report given to [employer] (oncoming nurse) by Rebekah Carlin RN (offgoing nurse). Report included the following information SBAR, Kardex, Intake/Output, MAR and Recent Results.

## 2017-05-18 LAB
GLUCOSE BLD STRIP.AUTO-MCNC: 103 MG/DL (ref 65–100)
GLUCOSE BLD STRIP.AUTO-MCNC: 163 MG/DL (ref 65–100)
GLUCOSE BLD STRIP.AUTO-MCNC: 178 MG/DL (ref 65–100)
GLUCOSE BLD STRIP.AUTO-MCNC: 216 MG/DL (ref 65–100)
SERVICE CMNT-IMP: ABNORMAL

## 2017-05-18 PROCEDURE — 74011250637 HC RX REV CODE- 250/637: Performed by: INTERNAL MEDICINE

## 2017-05-18 PROCEDURE — 74011250637 HC RX REV CODE- 250/637: Performed by: HOSPITALIST

## 2017-05-18 PROCEDURE — 65270000029 HC RM PRIVATE

## 2017-05-18 PROCEDURE — 74011250636 HC RX REV CODE- 250/636: Performed by: INTERNAL MEDICINE

## 2017-05-18 PROCEDURE — 82962 GLUCOSE BLOOD TEST: CPT

## 2017-05-18 PROCEDURE — 74011250636 HC RX REV CODE- 250/636: Performed by: HOSPITALIST

## 2017-05-18 PROCEDURE — 74011636637 HC RX REV CODE- 636/637: Performed by: HOSPITALIST

## 2017-05-18 PROCEDURE — 74011000250 HC RX REV CODE- 250: Performed by: INTERNAL MEDICINE

## 2017-05-18 RX ADMIN — OXYCODONE HYDROCHLORIDE 10 MG: 5 TABLET ORAL at 08:00

## 2017-05-18 RX ADMIN — Medication 325 MG: at 06:43

## 2017-05-18 RX ADMIN — OXYCODONE HYDROCHLORIDE 10 MG: 5 TABLET ORAL at 18:50

## 2017-05-18 RX ADMIN — PROPRANOLOL HYDROCHLORIDE 10 MG: 10 TABLET ORAL at 18:44

## 2017-05-18 RX ADMIN — OXYCODONE HYDROCHLORIDE 10 MG: 5 TABLET ORAL at 03:45

## 2017-05-18 RX ADMIN — LACTULOSE 20 G: 20 SOLUTION ORAL at 08:00

## 2017-05-18 RX ADMIN — SPIRONOLACTONE 25 MG: 25 TABLET, FILM COATED ORAL at 08:00

## 2017-05-18 RX ADMIN — PROPRANOLOL HYDROCHLORIDE 10 MG: 10 TABLET ORAL at 08:00

## 2017-05-18 RX ADMIN — OXYCODONE HYDROCHLORIDE 10 MG: 5 TABLET ORAL at 23:06

## 2017-05-18 RX ADMIN — OXYCODONE HYDROCHLORIDE 10 MG: 5 TABLET ORAL at 13:58

## 2017-05-18 RX ADMIN — OXYCODONE HYDROCHLORIDE AND ACETAMINOPHEN 500 MG: 500 TABLET ORAL at 08:00

## 2017-05-18 RX ADMIN — INSULIN LISPRO 3 UNITS: 100 INJECTION, SOLUTION INTRAVENOUS; SUBCUTANEOUS at 03:00

## 2017-05-18 RX ADMIN — HEPARIN SODIUM 5000 UNITS: 5000 INJECTION, SOLUTION INTRAVENOUS; SUBCUTANEOUS at 06:43

## 2017-05-18 RX ADMIN — DOXYCYCLINE HYCLATE 100 MG: 100 TABLET ORAL at 08:00

## 2017-05-18 RX ADMIN — PANTOPRAZOLE SODIUM 40 MG: 40 TABLET, DELAYED RELEASE ORAL at 06:43

## 2017-05-18 RX ADMIN — DOXYCYCLINE HYCLATE 100 MG: 100 TABLET ORAL at 22:23

## 2017-05-18 RX ADMIN — LIDOCAINE HYDROCHLORIDE 1 G: 10 INJECTION, SOLUTION EPIDURAL; INFILTRATION; INTRACAUDAL; PERINEURAL at 22:23

## 2017-05-18 RX ADMIN — INSULIN LISPRO 3 UNITS: 100 INJECTION, SOLUTION INTRAVENOUS; SUBCUTANEOUS at 17:00

## 2017-05-18 RX ADMIN — RIFAXIMIN 550 MG: 550 TABLET ORAL at 18:44

## 2017-05-18 RX ADMIN — AMLODIPINE BESYLATE 10 MG: 5 TABLET ORAL at 08:00

## 2017-05-18 RX ADMIN — Medication 325 MG: at 18:44

## 2017-05-18 RX ADMIN — HEPARIN SODIUM 5000 UNITS: 5000 INJECTION, SOLUTION INTRAVENOUS; SUBCUTANEOUS at 23:06

## 2017-05-18 RX ADMIN — INSULIN LISPRO 2 UNITS: 100 INJECTION, SOLUTION INTRAVENOUS; SUBCUTANEOUS at 11:58

## 2017-05-18 RX ADMIN — RIFAXIMIN 550 MG: 550 TABLET ORAL at 08:00

## 2017-05-18 RX ADMIN — HEPARIN SODIUM 5000 UNITS: 5000 INJECTION, SOLUTION INTRAVENOUS; SUBCUTANEOUS at 14:00

## 2017-05-18 NOTE — PROGRESS NOTES
Saw Ms Chava Barboza in room [phone] for a follow-up visit. Ms Chava Barboza was lying quietly in bed when  arrived. Provided compassionate presence as patient shared that she was feeling some discomfort at that time; patient appeared to doze off to sleep while she was talking. Inquired of patient as to whether it was a good time to visit and she shared that she would like to rest and have  come at a later time. Assured patient of prayers on her behalf and of  availability for support. : Rev. Toi Garrison.  Cyndi Kayist; [facility], to contact [phone] call: [phone]

## 2017-05-18 NOTE — PROGRESS NOTES
ID Progress Note  2017       vanco  Discontinued  merrem  Discontinued   levaquin discontinued  linezolid  Discontinued  Ertapenem  doxy    Subjective:     Afebrile. Has some leg pain. Objective:     Vitals:   Visit Vitals    /86 (BP 1 Location: Left arm, BP Patient Position: At rest)    Pulse 81    Temp 98.3 °F (36.8 °C)    Resp 17    Ht 5' 6\" (1.676 m)    Wt 64.5 kg (142 lb 3 oz)  Comment: unable to stand at present time    SpO2 98%    BMI 22.95 kg/m2        Tmax:  Temp (24hrs), Av.8 °F (36.6 °C), Min:97 °F (36.1 °C), Max:98.5 °F (36.9 °C)      Exam:    Not in distress  Lungs clear  RRR  Abdomen soft  Bilateral legs have some scabs. There is no louis cellulitis. Labs:   Lab Results   Component Value Date/Time    WBC 4.0 2017 03:55 AM    Hemoglobin (POC) 14.1 2012 05:07 PM    HGB 9.7 2017 03:55 AM    HCT 30.0 2017 03:55 AM    PLATELET 217  03:55 AM    MCV 85.7 2017 03:55 AM     Lab Results   Component Value Date/Time    Sodium 138 2017 03:55 AM    Potassium 4.2 2017 03:55 AM    Chloride 109 2017 03:55 AM    CO2 21 2017 03:55 AM    Anion gap 8 2017 03:55 AM    Glucose 131 2017 03:55 AM    BUN 18 2017 03:55 AM    Creatinine 1.11 2017 03:55 AM    BUN/Creatinine ratio 16 2017 03:55 AM    GFR est AA >60 2017 03:55 AM    GFR est non-AA 50 2017 03:55 AM    Calcium 7.9 2017 03:55 AM    Bilirubin, total 0.6 2017 06:31 AM    AST (SGOT) 88 2017 06:31 AM    Alk. phosphatase 96 2017 06:31 AM    Protein, total 7.6 2017 06:31 AM    Albumin 1.6 2017 06:31 AM    Globulin 6.0 2017 06:31 AM    A-G Ratio 0.3 2017 06:31 AM    ALT (SGPT) 69 2017 06:31 AM           Assessment:       1. Human immunodeficiency virus. 2. Hepatitis C with liver cirrhosis. 3. Bilateral leg ulcers.    4. Diabetes.       Recommendations:     cd4 is 389 with viral load of 359,000. She does not need prophylaxis for OIs. She will need treatment but this should started as an outpatient as a test for her compliance. Note of concern for Kaposi. Biopsy in march 2017 did not reveal this. Her current cd4 count also makes this less likely. Continue doxy until 5/23. She will need ertapenem until 5/24. Martine Brownlee MD

## 2017-05-18 NOTE — PROGRESS NOTES
MALU faxed IM ABX order to [facility] attn: Bradley Reaves phone # [phone].   Shane Chaudhary RN CRM    [id number] CM spoke with Nerissa in [facility] and scheduled IM ABX injections to start     Saturday 5/20 10:30 at [address]  Sunday 5/21 10:30am [address]  Monday 5/22  10:00am [address]  Tuesday 5/23   10:00am  [address]  Wednesday 5/24  11:00am  [address]      Information added to La Nena Mclain RN CRM

## 2017-05-18 NOTE — PROGRESS NOTES
Hospitalist Progress Note          Michelle Mabry MD  Please call  and page for questions. Call physician on-call through the  7pm-7am    Daily Progress Note: 2017    Primary care provider:Shabbir Bernstein MD    Date of admission: 5/10/2017  9:20 AM    Admission summery and hospital course:  70-year-old  woman with past medical history significant for HIV/AIDS who was diagnosed 20 years ago has lost to followup and not on HAART treatment currently, last CD4 count was 363 with a high viral load, chronic hepatitis C and liver cirrhosis, hypertension, degenerative joint disease, degenerative disk disease, diabetes   mellitus type 2, right lower leg ulcer, bilateral lower leg wound, possible vasculitis and anemia, who presented to [facility] Emergency Department with progressive pain and swelling of the right lower leg. Subjective:   Ms. Yulia Judd still has not spoken with her daughter about a biopsy. She feels about the same. Assessment/Plan:   Abdominal pain with dark color stool (POA) - no acute bleeding, HH is improving, has not reucrred  - CT 3/25 with cirrhosis and splenomegaly. Ascites. - KUB 5/15 non-specific  - Prior FOBT 3/25 negative, recheck positive, though with PO iron this may be a false positive  - Continue protonix  - Monitor HH  - GI consulted - agree that without an IV, an EGD is ill-advised    Right lower leg infected ulcer with bilateral lower leg ulcers (POA) - infection, though cryoglobulinemia is in the mix  - Cryoglobulin  qualitatively positive, checking confirmatory test  - Biopsy 3/28 with non-specific necroinflammatory crust, no vessels or deep tissue present  - Consider re-biopsy  - Continue ertapenem started 5/15.  Doxycycline started  - outpatient antibiotics per ID  - Wound care  - Surgery consulted for biopsy to rule out cryoglobulinemic vasculitis vs kaposi's sarcoma - patient wants to discuss with her daughter before agreeing  - ID consulted      HIV (chronic) - CD4 count 389, VL 359K  - not on HAART  - ID consulted     Liver cirrhosis - sec to Hep C, still with active infection  - Continue lactulose, propranolol, spironolactone  - Added rifaxamin    Anemia (chronic) - due to chronic blood loss, HH coming up  - Monitor HH    Thrombocytopenia (chronic) - due to cirrhosis, monitor    DM type 2 - A1c 5.8, likely falsely low in the setting of anemia  - continue SSI     HTN - stable, adjust meds as needed. Clonidine as PRN.      COPD - stable     See orders for other plans. VTE prophylaxis: Heparin  Code status: Full  Discussed plan of care with Patient/Family and Nurse. Pre-admission lived at home. Discharge planning: home tomorrow with continued wound care and IM abx at the infusion center       Review of Systems:     Review of Systems:  Symptom  Y/N  Comments   Symptom  Y/N  Comments    Fever/Chills   n   Chest Pain  n    Poor Appetite   y   Edema   n    Cough  n   Abdominal Pain   n    Sputum  n   Joint Pain      SOB/HUNTLEY  n   Pruritis/Rash      Nausea/vomit  n   Tolerating PT/OT      Diarrhea     Tolerating Diet      Constipation  y   Other      Could not obtain due to:         Objective:   Physical Exam:     Visit Vitals    /78    Pulse 80    Temp 98.5 °F (36.9 °C)    Resp 16    Ht 5' 6\" (1.676 m)    Wt 64.5 kg (142 lb 3 oz)  Comment: unable to stand at present time    SpO2 100%    BMI 22.95 kg/m2      O2 Device: Room air    Temp (24hrs), Av.7 °F (36.5 °C), Min:97 °F (36.1 °C), Max:98.5 °F (36.9 °C)               General:  Alert, cooperative, no distress, appears stated age. Lungs:   Clear to auscultation bilaterally. Chest wall:  No tenderness or deformity. Heart:  Regular rate and rhythm, S1, S2 normal, no murmur. Abdomen:   Soft, non-tender. Mildly distended, bowel sounds normal.    Extremities: Extremities normal, atraumatic, no cyanosis or edema.    Skin: Wounds at the right leg dry. DP and PT pulses 2+ in right goot   Neurologic: CNII-XII intact. + asterixis     Data Review:       Recent Days:  Recent Labs      05/17/17 0355   WBC  4.0   HGB  9.7*   HCT  30.0*   PLT  146*     Recent Labs      05/17/17 0355   NA  138   K  4.2   CL  109*   CO2  21   GLU  131*   BUN  18   CREA  1.11*   CA  7.9*     No results for input(s): PH, PCO2, PO2, HCO3, FIO2 in the last 72 hours.     24 Hour Results:  Recent Results (from the past 24 hour(s))   GLUCOSE, POC    Collection Time: 05/17/17  4:15 PM   Result Value Ref Range    Glucose (POC) 258 (H) 65 - 100 mg/dL    Performed by Felisha Marie, POC    Collection Time: 05/17/17  9:57 PM   Result Value Ref Range    Glucose (POC) 158 (H) 65 - 100 mg/dL    Performed by Felisha Marie, POC    Collection Time: 05/18/17  6:25 AM   Result Value Ref Range    Glucose (POC) 103 (H) 65 - 100 mg/dL    Performed by Bertha, POC    Collection Time: 05/18/17 11:36 AM   Result Value Ref Range    Glucose (POC) 163 (H) 65 - 100 mg/dL    Performed by Nadira Valderrama        Problem List:  Problem List as of 5/18/2017  Date Reviewed: 5/10/2017          Codes Class Noted - Resolved    * (Principal)Cellulitis of right leg ICD-10-CM: L03.115  ICD-9-CM: 682.6  5/10/2017 - Present        Generalized abdominal pain ICD-10-CM: R10.84  ICD-9-CM: 789.07  3/25/2017 - Present        Pancytopenia ([facility] ICD-10-CM: P35.231  ICD-9-CM: 284.19  3/25/2017 - Present        Multiple open wounds of lower leg ICD-10-CM: C05.070I  ICD-9-CM: 894.0  12/3/2015 - Present        Boils ICD-10-CM: L02.92  ICD-9-CM: 680.9  1/15/2015 - Present        Chronic airway obstruction, not elsewhere classified (Chronic) ICD-10-CM: J44.9  ICD-9-CM: 198  12/23/2013 - Present        COPD exacerbation ([facility] ICD-10-CM: J44.1  ICD-9-CM: 491.21  12/23/2013 - Present        Degenerative Joint Disease ICD-10-CM: M19.90  ICD-9-CM: 715.90  7/21/2011 - Present Degenerative Disc Disease ICD-10-CM: [id number]  ICD-9-CM: 722.6  7/21/2011 - Present        Chronic hepatitis C without mention of hepatic coma ICD-10-CM: B18.2  ICD-9-CM: 070.54  7/21/2011 - Present        Insomnia ICD-10-CM: G47.00  ICD-9-CM: 780.52  7/21/2011 - Present        Allergic rhinitis, cause unspecified ICD-10-CM: J30.9  ICD-9-CM: 477.9  7/21/2011 - Present        HIV (human immunodeficiency virus infection) ([facility] ICD-10-CM: Z21  ICD-9-CM: V08  7/21/2011 - Present        Pulmonary nodules ICD-10-CM: R91.8  ICD-9-CM: 793.19  7/21/2011 - Present        Diabetes mellitus type 2, controlled ([facility] ICD-10-CM: E11.9  ICD-9-CM: 250.00  7/21/2011 - Present        Bruised ribs ICD-10-CM: G67.911Y  ICD-9-CM: 922.1  7/21/2011 - Present        Sprain and strain of unspecified site of shoulder and upper arm ICD-10-CM: C59.178J, S46.919A  ICD-9-CM: 840.9  7/21/2011 - Present        Arthritis, Degenerative, Knee ICD-10-CM: M17.10  ICD-9-CM: 715.96  7/21/2011 - Present        HTN (hypertension) ICD-10-CM: I10  ICD-9-CM: 401.9  7/21/2011 - Present        RESOLVED: COPD exacerbation ([facility] ICD-10-CM: J44.1  ICD-9-CM: 491.21  12/16/2013 - 12/23/2013              Medications reviewed  Current Facility-Administered Medications   Medication Dose Route Frequency    lactulose (CHRONULAC) solution 20 g  20 g Oral BID    rifAXIMin (XIFAXAN) tablet 550 mg  550 mg Oral BID    dextrose 10 % infusion 125-250 mL  125-250 mL IntraVENous PRN    doxycycline (VIBRA-TABS) tablet 100 mg  100 mg Oral Q12H    cloNIDine HCl (CATAPRES) tablet 0.1 mg  0.1 mg Oral Q4H PRN    ertapenem (INVANZ) 1 g in lidocaine (PF) (XYLOCAINE) 10 mg/mL (1 %) 3.2 mL injection  1 g IntraMUSCular DAILY    pantoprazole (PROTONIX) tablet 40 mg  40 mg Oral ACB    ferrous sulfate tablet 325 mg  325 mg Oral BID WITH MEALS    ascorbic acid (vitamin C) (VITAMIN C) tablet 500 mg  500 mg Oral DAILY    fish oil-omega-3 fatty acids 340-1,000 mg capsule 1 Cap  1 Cap Oral EVERY OTHER DAY    oxyCODONE IR (ROXICODONE) tablet 10 mg  10 mg Oral Q4H PRN    propranolol (INDERAL) tablet 10 mg  10 mg Oral BID    sodium chloride (NS) flush 5-10 mL  5-10 mL IntraVENous Q8H    sodium chloride (NS) flush 5-10 mL  5-10 mL IntraVENous PRN    heparin (porcine) injection 5,000 Units  5,000 Units SubCUTAneous Q8H    insulin lispro (HUMALOG) injection   SubCUTAneous AC&HS    glucose chewable tablet 16 g  4 Tab Oral PRN    glucagon (GLUCAGEN) injection 1 mg  1 mg IntraMUSCular PRN    albuterol-ipratropium (DUO-NEB) 2.5 MG-0.5 MG/3 ML  3 mL Nebulization Q4H PRN    spironolactone (ALDACTONE) tablet 25 mg  25 mg Oral DAILY    amLODIPine (NORVASC) tablet 10 mg  10 mg Oral DAILY       Care Plan discussed with: Patient/Family and Nurse      Bean Fox MD

## 2017-05-18 NOTE — PROGRESS NOTES
Bedside shift change report given to Sandra DiegoRN (oncoming nurse) by Nora Rosenthal RN(offgoing nurse). Report given with SBAR.

## 2017-05-18 NOTE — PROGRESS NOTES
Bedside shift change report given to Miguelangel Hamilton RN (oncoming nurse) by Windell Dakins, RN (offgoing nurse). Report given with SBAR, Kardex, Intake/Output and MAR.

## 2017-05-18 NOTE — PROGRESS NOTES
[address]  [address] [facility], [address]       GI PROGRESS NOTE  [facility]  [phone] office  [phone] NP in-hospital cell phone M-F until 4:30  After 5pm or on weekends, please call  for physician on call      NAME: Sandrine Kelley   :  1955   MRN:  [id number]       Subjective:     Pt is complaining about leg pain. Denies any black/bloody stool. Falls asleep during exam.    Objective:     VITALS:   Last 24hrs VS reviewed since prior progress note. Most recent are:  Visit Vitals    /86 (BP 1 Location: Left arm, BP Patient Position: At rest)    Pulse 81    Temp 98.3 °F (36.8 °C)    Resp 17    Ht 5' 6\" (1.676 m)    Wt 64.5 kg (142 lb 3 oz)    SpO2 98%    BMI 22.95 kg/m2       PHYSICAL EXAM:  General: Cooperative, no acute distress    Neurologic:  Alert and oriented X 3. HEENT: PERRL, EOMI. Lungs:  CTA bilaterally. No wheezing  Heart:  S1 S2, regular rhythm, no murmur   Abdomen: Soft, non distended, non tender. +Bowel sounds  Extremities: No edema  Psych:   Good insight. Not anxious or agitated.     Lab Data Reviewed:     Recent Results (from the past 24 hour(s))   GLUCOSE, POC    Collection Time: 17  4:15 PM   Result Value Ref Range    Glucose (POC) 258 (H) 65 - 100 mg/dL    Performed by Rito Joseph, POC    Collection Time: 17  9:57 PM   Result Value Ref Range    Glucose (POC) 158 (H) 65 - 100 mg/dL    Performed by Rito Joseph, POC    Collection Time: 17  6:25 AM   Result Value Ref Range    Glucose (POC) 103 (H) 65 - 100 mg/dL    Performed by Bertha, POC    Collection Time: 17 11:36 AM   Result Value Ref Range    Glucose (POC) 163 (H) 65 - 100 mg/dL    Performed by [facility]          ________________________________________________________________________       Assessment:   · Anemia: improved  · Melena: resolved per patient     Patient Active Problem List   Diagnosis Code    Degenerative Joint Disease M19.90    Degenerative Disc Disease [id number]    Chronic hepatitis C without mention of hepatic coma B18.2    Insomnia G47.00    Allergic rhinitis, cause unspecified J30.9    HIV ([facility] immunodeficiency virus infection) ([facility] Z21    Pulmonary nodules R91.8    Diabetes mellitus type 2, controlled ([facility] E11.9    Bruised ribs S20.219A    Sprain and strain of unspecified site of shoulder and upper arm S43.409A, S46.919A    Arthritis, Degenerative, Knee M17.10    HTN (hypertension) I10    Chronic airway obstruction, not elsewhere classified J44.9    COPD exacerbation (HCC) J44.1    Boils L02.92    Multiple open wounds of lower leg S81.809A    Generalized abdominal pain R10.84    Pancytopenia (HCC) D61.818    Cellulitis of right leg L03.115     Plan:   · Unable to perform EGD without IV access and pt bleeding seems to have stopped  · Please call back if pt condition changes  · GI will sign off at this time. Signed By: Thelma Slater.  Gianni Meehan NP     5/18/2017  4:00 PM                 0

## 2017-05-18 NOTE — PROGRESS NOTES
Bedside shift change report given to Nataliia Pearce RN (oncoming nurse) by TORSTEN Larios RN (offgoing nurse). Report included the following information SBAR.

## 2017-05-19 VITALS
BODY MASS INDEX: 22.85 KG/M2 | OXYGEN SATURATION: 99 % | RESPIRATION RATE: 14 BRPM | HEART RATE: 86 BPM | SYSTOLIC BLOOD PRESSURE: 152 MMHG | TEMPERATURE: 98.3 F | HEIGHT: 66 IN | WEIGHT: 142.19 LBS | DIASTOLIC BLOOD PRESSURE: 84 MMHG

## 2017-05-19 LAB
GLUCOSE BLD STRIP.AUTO-MCNC: 102 MG/DL (ref 65–100)
GLUCOSE BLD STRIP.AUTO-MCNC: 169 MG/DL (ref 65–100)
SERVICE CMNT-IMP: ABNORMAL
SERVICE CMNT-IMP: ABNORMAL

## 2017-05-19 PROCEDURE — 74011636637 HC RX REV CODE- 636/637: Performed by: HOSPITALIST

## 2017-05-19 PROCEDURE — 74011250637 HC RX REV CODE- 250/637: Performed by: INTERNAL MEDICINE

## 2017-05-19 PROCEDURE — 82962 GLUCOSE BLOOD TEST: CPT

## 2017-05-19 PROCEDURE — 74011250636 HC RX REV CODE- 250/636: Performed by: HOSPITALIST

## 2017-05-19 PROCEDURE — 74011250637 HC RX REV CODE- 250/637: Performed by: HOSPITALIST

## 2017-05-19 RX ORDER — DOXYCYCLINE 100 MG/1
100 CAPSULE ORAL 2 TIMES DAILY
Qty: 10 CAP | Refills: 0 | Status: SHIPPED | OUTPATIENT
Start: 2017-05-19 | End: 2017-05-24

## 2017-05-19 RX ADMIN — SPIRONOLACTONE 25 MG: 25 TABLET, FILM COATED ORAL at 08:47

## 2017-05-19 RX ADMIN — Medication 325 MG: at 07:12

## 2017-05-19 RX ADMIN — Medication 1 CAPSULE: at 07:13

## 2017-05-19 RX ADMIN — DOXYCYCLINE HYCLATE 100 MG: 100 TABLET ORAL at 08:47

## 2017-05-19 RX ADMIN — RIFAXIMIN 550 MG: 550 TABLET ORAL at 08:47

## 2017-05-19 RX ADMIN — OXYCODONE HYDROCHLORIDE AND ACETAMINOPHEN 500 MG: 500 TABLET ORAL at 08:47

## 2017-05-19 RX ADMIN — OXYCODONE HYDROCHLORIDE 10 MG: 5 TABLET ORAL at 07:12

## 2017-05-19 RX ADMIN — PROPRANOLOL HYDROCHLORIDE 10 MG: 10 TABLET ORAL at 08:47

## 2017-05-19 RX ADMIN — HEPARIN SODIUM 5000 UNITS: 5000 INJECTION, SOLUTION INTRAVENOUS; SUBCUTANEOUS at 07:13

## 2017-05-19 RX ADMIN — PANTOPRAZOLE SODIUM 40 MG: 40 TABLET, DELAYED RELEASE ORAL at 07:12

## 2017-05-19 RX ADMIN — AMLODIPINE BESYLATE 10 MG: 5 TABLET ORAL at 08:47

## 2017-05-19 RX ADMIN — LACTULOSE 20 G: 20 SOLUTION ORAL at 08:47

## 2017-05-19 RX ADMIN — INSULIN LISPRO 2 UNITS: 100 INJECTION, SOLUTION INTRAVENOUS; SUBCUTANEOUS at 11:53

## 2017-05-19 NOTE — PROGRESS NOTES
Bedside and Verbal shift change report given to DarynRN (oncoming nurse) by Elaine Lombard, RN (offgoing nurse). Report given with SBAR and Kardex.

## 2017-05-19 NOTE — PROGRESS NOTES
ID Progress Note  2017       vanco  Discontinued  merrem  Discontinued   levaquin discontinued  linezolid  Discontinued  Ertapenem  doxy    Subjective:     Afebrile. Has some leg pain. Objective:     Vitals:   Visit Vitals    /84 (BP 1 Location: Right arm, BP Patient Position: At rest)    Pulse 86    Temp 98.3 °F (36.8 °C)    Resp 14    Ht 5' 6\" (1.676 m)    Wt 64.5 kg (142 lb 3 oz)  Comment: unable to stand at present time    SpO2 99%    BMI 22.95 kg/m2        Tmax:  Temp (24hrs), Av.2 °F (36.8 °C), Min:98.1 °F (36.7 °C), Max:98.3 °F (36.8 °C)      Exam:    Not in distress  Bilateral legs have some scabs. There is no louis cellulitis. Labs:   Lab Results   Component Value Date/Time    WBC 4.0 2017 03:55 AM    Hemoglobin (POC) 14.1 2012 05:07 PM    HGB 9.7 2017 03:55 AM    HCT 30.0 2017 03:55 AM    PLATELET 770  03:55 AM    MCV 85.7 2017 03:55 AM     Lab Results   Component Value Date/Time    Sodium 138 2017 03:55 AM    Potassium 4.2 2017 03:55 AM    Chloride 109 2017 03:55 AM    CO2 21 2017 03:55 AM    Anion gap 8 2017 03:55 AM    Glucose 131 2017 03:55 AM    BUN 18 2017 03:55 AM    Creatinine 1.11 2017 03:55 AM    BUN/Creatinine ratio 16 2017 03:55 AM    GFR est AA >60 2017 03:55 AM    GFR est non-AA 50 2017 03:55 AM    Calcium 7.9 2017 03:55 AM    Bilirubin, total 0.6 2017 06:31 AM    AST (SGOT) 88 2017 06:31 AM    Alk. phosphatase 96 2017 06:31 AM    Protein, total 7.6 2017 06:31 AM    Albumin 1.6 2017 06:31 AM    Globulin 6.0 2017 06:31 AM    A-G Ratio 0.3 2017 06:31 AM    ALT (SGPT) 69 2017 06:31 AM           Assessment:       1. Human immunodeficiency virus. 2. Hepatitis C with liver cirrhosis. 3. Bilateral leg ulcers. 4. Diabetes.       Recommendations:     cd4 is 389 with viral load of 359,000.  She does not need prophylaxis for OIs. She will need treatment but this should started as an outpatient as a test for her compliance. Note of concern for [place]. Biopsy in march 2017 did not reveal this. Her current cd4 count also makes this less likely. Continue doxy until 5/23. She will need ertapenem until 5/24. She is still contemplating biopsy. Team available over the weekend.      Coleen Leon MD

## 2017-05-19 NOTE — PROGRESS NOTES
Care Management-Received call from patient's daughter. She said she will be coming to  patient at 2:15 PM today. Discussed [facility] appointments and locations. Typed these appointments with the addresses and the specific dates, locations, and times on a word document and gave to patient's nurse to give to patient with her discharge instructions.      ANH Saucedo

## 2017-05-19 NOTE — ROUTINE PROCESS
Bedside shift change report given to ChandanRN (oncoming nurse) by Katt Henson RN(offgoing nurse). Report given with SBAR.

## 2017-05-19 NOTE — PROGRESS NOTES
I have reviewed discharge instructions with the patient. The patient verbalized understanding. Patient leaving via car with daughter. Patient leaving with discharge instructions and scripts.

## 2017-05-19 NOTE — PROGRESS NOTES
Surgery Progress Note    Admit Date: 5/10/2017      Subjective:     Complaints of pain in her leg. She has not discussed biopsy with her daughter yet. She would like us to check back later. Falling asleep during exam.         Objective:     Patient Vitals for the past 8 hrs:   BP Temp Pulse Resp SpO2   05/19/17 0846 152/84 98.3 °F (36.8 °C) 86 14 99 %   05/19/17 0413 174/85 98.2 °F (36.8 °C) 84 16 100 %         ip  Physical Exam:      Lower extremity. Ulceration to Lateral RLE. Tan slough, white salve on wound by patient. Left LE with ulceration, scabbed over. .           CBC: Lab Results   Component Value Date/Time    WBC 4.0 05/17/2017 03:55 AM    RBC 3.50 05/17/2017 03:55 AM    HGB 9.7 05/17/2017 03:55 AM    HCT 30.0 05/17/2017 03:55 AM    PLATELET 041 48/39/7832 03:55 AM     BMP: Lab Results   Component Value Date/Time    Glucose 131 05/17/2017 03:55 AM    Sodium 138 05/17/2017 03:55 AM    Potassium 4.2 05/17/2017 03:55 AM    Chloride 109 05/17/2017 03:55 AM    CO2 21 05/17/2017 03:55 AM    BUN 18 05/17/2017 03:55 AM    Creatinine 1.11 05/17/2017 03:55 AM    Calcium 7.9 05/17/2017 03:55 AM     CMP:  Lab Results   Component Value Date/Time    Glucose 131 05/17/2017 03:55 AM    Sodium 138 05/17/2017 03:55 AM    Potassium 4.2 05/17/2017 03:55 AM    Chloride 109 05/17/2017 03:55 AM    CO2 21 05/17/2017 03:55 AM    BUN 18 05/17/2017 03:55 AM    Creatinine 1.11 05/17/2017 03:55 AM    Calcium 7.9 05/17/2017 03:55 AM    Anion gap 8 05/17/2017 03:55 AM    BUN/Creatinine ratio 16 05/17/2017 03:55 AM    Alk.  phosphatase 96 05/11/2017 06:31 AM    Protein, total 7.6 05/11/2017 06:31 AM    Albumin 1.6 05/11/2017 06:31 AM    Globulin 6.0 05/11/2017 06:31 AM    A-G Ratio 0.3 05/11/2017 06:31 AM               Assessment:   Patient Active Hospital Problem List:   Cellulitis of right leg (5/10/2017)     HIV (human immunodeficiency virus infection) ([facility] (7/21/2011)     Diabetes mellitus type 2, controlled ([facility] (7/21/2011)     HTN (hypertension) (7/21/2011)     Pancytopenia ([address] (3/25/2017)          Plan:     Antibiotics: per Orlando ZAYAS  Awaiting patient to speak with daughter. Further plan per Dr. Donal Koo.    Ming Curiel, NP

## 2017-05-19 NOTE — PROGRESS NOTES
Roselyn discussed in 5S rounds, anticipate discharge home today. As noted by CM yesterday, [facility] schedule in place and on AVS. Expect discharge home with [facility] follow-up as listed below, no further CM needs.  ANH Simpson/ZOHREH    Saturday 5/20 10:30 at [address]  Sunday 5/21 10:30am [address]  Monday 5/22 10:00am [address] [facility]  Tuesday 5/23 10:00am [address] [facility]  Wednesday 5/24 11:00am [address] [facility]

## 2017-05-19 NOTE — DISCHARGE SUMMARY
Discharge Summary     Patient:  Arcenio Marrero       MRN: [id number]       YOB: 1955       Age: 60 y.o. Date of admission:  5/10/2017    Date of discharge:  5/19/2017    Primary care provider: Dr. Henrietta Arango MD    Admitting provider:  Grupo Carrizales MD    Discharging provider:  Jose D[address] [phone]. If unavailable, call [phone] and ask the  to page the triage hospitalist.    Consultations  · IP CONSULT TO INFECTIOUS DISEASES  · IP CONSULT TO GASTROENTEROLOGY  · IP CONSULT TO GENERAL SURGERY    Procedures  · * No surgery found *    Discharge destination: Home. The patient is stable for discharge. Admission diagnosis  · Cellulitis of right leg    Current Discharge Medication List      START taking these medications    Details   ertapenem 625 mg/2 mL in lidocaine injection 3.2 mL by IntraMUSCular route daily. Saturday 5/20 10:30 at [address]  Sunday 5/21 10:30am [address]  Monday 5/22 10:00am [address] [facility] [place]  Tuesday 5/23 10:00am [facility]  Wednesday 5/24 11:00am [facility]  Qty: 5 g, Refills: 0      rifAXIMin (XIFAXAN) 550 mg tablet Take 1 Tab by mouth two (2) times a day. Qty: 60 Tab, Refills: 2      doxycycline (MONODOX) 100 mg capsule Take 1 Cap by mouth two (2) times a day for 5 days. Qty: 10 Cap, Refills: 0         CONTINUE these medications which have NOT CHANGED    Details   spironolactone (ALDACTONE) 25 mg tablet Take 25 mg by mouth daily. amLODIPine (NORVASC) 10 mg tablet Take 10 mg by mouth daily. insulin glulisine (APIDRA) 100 unit/mL injection 10 Units by SubCUTAneous route Daily (before breakfast). lactulose (CHRONULAC) 10 gram/15 mL solution Take 10 g by mouth two (2) times a day.       ferrous sulfate 325 mg (65 mg iron) tablet Take 1 Tab by mouth two (2) times daily (with meals). Qty: 60 Tab, Refills: 1      oxyCODONE IR (ROXICODONE) 10 mg tab immediate release tablet Take 1 Tab by mouth every four (4) hours as needed. Max Daily Amount: 60 mg.  Qty: 30 Tab, Refills: 0      propranolol (INDERAL) 10 mg tablet Take 1 Tab by mouth two (2) times a day. Qty: 60 Tab, Refills: 1      insulin lispro (HUMALOG KWIKPEN) 100 unit/mL kwikpen 1 Units by SubCUTAneous route Before breakfast, lunch, dinner and at bedtime. 150-200=2 nits  201-250=4 units  251-300=6 units  301-350=8 units  351-400=10 units  More than 400 call MD  Qty: 15 mL, Refills: 1      SPIRIVA WITH HANDIHALER 18 mcg inhalation capsule inhale the contents of one capsule in the handihaler once daily  Qty: 30 Cap, Refills: 2      albuterol (PROVENTIL HFA, VENTOLIN HFA, PROAIR HFA) 90 mcg/actuation inhaler Take 2 puffs by inhalation every four (4) hours as needed for Wheezing. Qty: 1 Inhaler, Refills: 12    Associated Diagnoses: Bronchitis, acute; COPD exacerbation (HCC)      fluticasone-salmeterol (ADVAIR) 250-50 mcg/dose diskus inhaler Take 1 Puff by inhalation two (2) times a day. Qty: 1 Inhaler, Refills: 12    Associated Diagnoses: COPD exacerbation (HCC)      omega-3 fatty acids-vitamin e (FISH OIL) 1,000 mg Cap Take 1 Cap by mouth every other day. Associated Diagnoses: Type II or unspecified type diabetes mellitus without mention of complication, uncontrolled      ascorbic acid (VITAMIN C) 1,000 mg tablet Take 1,000 mg by mouth every other day. Associated Diagnoses: Type II or unspecified type diabetes mellitus without mention of complication, uncontrolled      triamcinolone (ARISTOCORT) 0.5 % topical cream Apply  to affected area two (2) times a day.  use thin layer  Qty: 45 g, Refills: 5    Associated Diagnoses: Eczema         STOP taking these medications       Blood-Glucose Meter monitoring kit Comments:   Reason for Stopping:         TRUEPLUS LANCETS 33 gauge misc Comments: Reason for Stopping:         glucose blood VI test strips (TRUETEST TEST STRIPS) strip Comments:   Reason for Stopping:         Lancets misc Comments:   Reason for Stopping:         glucose blood VI test strips (BLOOD GLUCOSE TEST) strip Comments:   Reason for Stopping:         Insulin Needles, Disposable, (BD INSULIN PEN NEEDLE UF MINI) 31 x 3/16 \" ndle Comments:   Reason for Stopping:         Nebulizer & Compressor machine Comments:   Reason for Stopping: Follow-up Information     Follow up With Details Comments Contact Info    Ap Salazar MD On 5/25/2017 For discharge follow up at 8:30AM  [address] [facility] [address] [facility]      [facility] to Saturday 5/20   at 10:30am      [facility] to Sunday 5/21/17  at 10:30am       [address] Go to Monday 5/22/17 10:00am     [facility] Go to Tuesday 5/23/17 10:00am     [address] to Wednesday 5/24/17 11:00am     Eliana Ugarte MD Schedule an appointment as soon as possible for a visit in 2 weeks For follow up of HIV, hepatitis C and leg wound [address] [place] [address]      Chun Barragan MD Call If you want to persue a biopsy of your leg wound [address] [facility] [address] [phone]            Final discharge diagnoses and brief hospital course  Please also refer to the admission H&P for details on the presenting problem.     76-year-old [facility] American woman with past medical history significant for HIV/AIDS who was diagnosed 20 years ago has lost to followup and not on HAART treatment currently, last CD4 count was 363 with a high viral load, chronic hepatitis C and liver cirrhosis, hypertension, degenerative joint disease, degenerative disk disease, diabetes   mellitus type 2, right lower leg ulcer, bilateral lower leg wound, possible vasculitis and anemia, who presented to [facility] Emergency Department with progressive pain and swelling of the right lower leg. Abdominal pain with dark color stool (POA) - no acute bleeding, HH is improving, has not reucrred  - CT 3/25 with cirrhosis and splenomegaly. Ascites. 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 I have given her information if she wishes to pursue a biopsy  - Continue ertapenem started 5/15. Doxycycline started 5/16 - outpatient antibiotics per ID  - Wound care  - ID consulted       HIV (chronic) - CD4 count 389, VL 359K  - not on HAART  - ID consulted - recommended follow up with ID and starting HAART     Liver cirrhosis - sec to Hep C, still with active infection  - Continue lactulose, propranolol, spironolactone  - Added rifaxamin     Anemia (chronic) - due to chronic blood loss, HH coming up  - Monitor HH     Thrombocytopenia (chronic) - due to cirrhosis, monitor     DM type 2 - A1c 5.8, likely falsely low in the setting of anemia  - continue SSI      HTN - stable, adjust meds as needed. Clonidine as PRN.     COPD - stable    FOLLOW-UP CARE RECOMMENDATIONS:     SYMPTOMS to watch for: chest pain, shortness of breath, fever, chills, nausea, vomiting, diarrhea. DIET/what to eat:  Cardiac Diet    ACTIVITY:  Activity as tolerated    WOUND CARE: Please keep weight and pressure off of your leg wounds. It is fine to leave them uncovered. What to do if new or unexpected symptoms occur? If you experience any of the above symptoms (or should other concerns or questions arise after discharge) please call your primary care physician. Return to the emergency room if you cannot get hold of your doctor. · It is very important that you keep your follow-up appointment(s). · Please bring discharge papers, medication list (and/or medication bottles) to your follow-up appointments for review by your outpatient provider(s). · Please check the list of medications and be sure it includes every medication (even non-prescription medications) that your provider wants you to take. · It is important that you take the medication exactly as they are prescribed. · Keep your medication in the bottles provided by the pharmacist and keep a list of the medication names, dosages, and times to be taken in your wallet. · Do not take other medications without consulting your doctor. · If you have any questions about your medications or other instructions, please talk to your nurse or care provider before you leave the hospital.      Physical examination at discharge  Visit Vitals    /84 (BP 1 Location: Right arm, BP Patient Position: At rest)    Pulse 86    Temp 98.3 °F (36.8 °C)    Resp 14    Ht 5' 6\" (1.676 m)    Wt 64.5 kg (142 lb 3 oz)    SpO2 99%    BMI 22.95 kg/m2       General:  Alert, cooperative, no distress, appears stated age. Lungs:  Clear to auscultation bilaterally. Chest wall:  No tenderness or deformity. Heart:  Regular rate and rhythm, S1, S2 normal, no murmur. Abdomen:  Soft, non-tender. Mildly distended, bowel sounds normal.    Extremities: Extremities normal, atraumatic, no cyanosis or edema. Skin: Wounds at the right leg dry.  DP and PT pulses 2+ in right goot   Neurologic: CNII-XII intact. + asterixis      Pertinent imaging studies:    Abdominal Xray 5/15/17  FINDINGS: A supine radiograph of the abdomen shows a nonspecific gas pattern. There is stool in the left transverse colon. Soft tissue density is noted in the  pelvis. The bowel loops are more central than seen previously. IMPRESSION: Nonspecific gas pattern. Possible bladder distention or ascites      Recent Labs      05/17/17   0355   WBC  4.0   HGB  9.7*   HCT  30.0*   PLT  146*     Recent Labs      05/17/17   0355   NA  138   K  4.2   CL  109*   CO2  21   BUN  18   CREA  1.11*   GLU  131*   CA  7.9*     No results for input(s): SGOT, GPT, AP, TBIL, TP, ALB, GLOB, GGT, AML, LPSE in the last 72 hours. No lab exists for component: AMYP, HLPSE  No results for input(s): INR, PTP, APTT in the last 72 hours. No lab exists for component: INREXT   No results for input(s): FE, TIBC, PSAT, FERR in the last 72 hours. No results for input(s): PH, PCO2, PO2 in the last 72 hours. No results for input(s): CPK, CKMB in the last 72 hours.     No lab exists for component: TROPONINI  No components found for: [place]    Chronic Diagnoses:    Problem List as of 5/19/2017  Date Reviewed: 5/10/2017          Codes Class Noted - Resolved    * (Principal)Cellulitis of right leg ICD-10-CM: L03.115  ICD-9-CM: 682.6  5/10/2017 - Present        Generalized abdominal pain ICD-10-CM: R10.84  ICD-9-CM: 789.07  3/25/2017 - Present        Pancytopenia ([facility] ICD-10-CM: F39.731  ICD-9-CM: 284.19  3/25/2017 - Present        Multiple open wounds of lower leg ICD-10-CM: F74.777L  ICD-9-CM: 894.0  12/3/2015 - Present        Boils ICD-10-CM: L02.92  ICD-9-CM: 680.9  1/15/2015 - Present        Chronic airway obstruction, not elsewhere classified (Chronic) ICD-10-CM: J44.9  ICD-9-CM: 629  12/23/2013 - Present        COPD exacerbation ([facility] ICD-10-CM: J44.1  ICD-9-CM: 491.21  12/23/2013 - Present        Degenerative Joint Disease ICD-10-CM: M19.90  ICD-9-CM: 715.90  7/21/2011 - Present        Degenerative Disc Disease ICD-10-CM: [id number]  ICD-9-CM: 722.6  7/21/2011 - Present        Chronic hepatitis C without mention of hepatic coma ICD-10-CM: B18.2  ICD-9-CM: 070.54  7/21/2011 - Present        Insomnia ICD-10-CM: G47.00  ICD-9-CM: 780.52  7/21/2011 - Present        Allergic rhinitis, cause unspecified ICD-10-CM: J30.9  ICD-9-CM: 477.9  7/21/2011 - Present        HIV (human immunodeficiency virus infection) ([facility] ICD-10-CM: Z21  ICD-9-CM: Koffi Keira  7/21/2011 - Present        Pulmonary nodules ICD-10-CM: R91.8  ICD-9-CM: 793.19  7/21/2011 - Present        Diabetes mellitus type 2, controlled ([facility] ICD-10-CM: E11.9  ICD-9-CM: 250.00  7/21/2011 - Present        Bruised ribs ICD-10-CM: T04.787F  ICD-9-CM: 922.1  7/21/2011 - Present        Sprain and strain of unspecified site of shoulder and upper arm ICD-10-CM: Q39.505W, L30.510W  ICD-9-CM: 840.9  7/21/2011 - Present        Arthritis, Degenerative, Knee ICD-10-CM: M17.10  ICD-9-CM: 715.96  7/21/2011 - Present        HTN (hypertension) ICD-10-CM: I10  ICD-9-CM: 401.9  7/21/2011 - Present        RESOLVED: COPD exacerbation ([facility] ICD-10-CM: J44.1  ICD-9-CM: 491.21  12/16/2013 - 12/23/2013              Time spent on discharge related activities today greater than 30 minutes.       Signed:  Shiv Riggins MD                 Hospitalist, Internal Medicine      Cc: Pedro Whiteside MD

## 2017-05-19 NOTE — DISCHARGE INSTRUCTIONS
Please bring this form with you to show your primary care provider at your follow-up appointment. Primary care provider:  Dr. Valarie Eid MD    Discharging provider:  Wende Dakin, MD    You have been admitted to the hospital with the following diagnoses:  Cellulitis of right leg    FOLLOW-UP CARE RECOMMENDATIONS:    APPOINTMENTS:  Follow-up Information     Follow up With Details Comments Contact Info    Valarie Eid MD On 5/25/2017 For discharge follow up at 8:30AM  [address] [place] [address] to Saturday 5/20   at 10:30am      [address] [facility] to Sunday 5/21/17  at 10:30am       [address] to Monday 5/22/17 10:00am     [facility] to Tuesday 5/23/17 10:00am     [address] to Wednesday 5/24/17 11:00am     Antionette Dickey MD Schedule an appointment as soon as possible for a visit in 2 weeks For follow up of HIV, hepatitis C and leg wound [address] [place] [address]      Teodora Toribio MD Call If you want to persue a biopsy of your leg wound [address] [facility],[address]  [phone]           SYMPTOMS to watch for: chest pain, shortness of breath, fever, chills, nausea, vomiting, diarrhea. DIET/what to eat:  Cardiac Diet    ACTIVITY:  Activity as tolerated    WOUND CARE: Please keep weight and pressure off of your leg wounds. It is fine to leave them uncovered. What to do if new or unexpected symptoms occur? If you experience any of the above symptoms (or should other concerns or questions arise after discharge) please call your primary care physician. Return to the emergency room if you cannot get hold of your doctor. · It is very important that you keep your follow-up appointment(s).   · Please bring discharge papers, medication list (and/or medication bottles) to your follow-up appointments for review by your outpatient provider(s). · Please check the list of medications and be sure it includes every medication (even non-prescription medications) that your provider wants you to take. · It is important that you take the medication exactly as they are prescribed. · Keep your medication in the bottles provided by the pharmacist and keep a list of the medication names, dosages, and times to be taken in your wallet. · Do not take other medications without consulting your doctor. · If you have any questions about your medications or other instructions, please talk to your nurse or care provider before you leave the hospital.    I understand that if any problems occur once I am at home I am to contact my physician. These instructions were explained to me and I had the opportunity to ask questions.

## 2017-05-20 ENCOUNTER — HOSPITAL ENCOUNTER (OUTPATIENT)
Dept: INFUSION THERAPY | Age: 62
Discharge: HOME | End: 2017-05-20
Payer: MEDICAID

## 2017-05-20 VITALS
DIASTOLIC BLOOD PRESSURE: 85 MMHG | TEMPERATURE: 98.5 F | SYSTOLIC BLOOD PRESSURE: 153 MMHG | HEART RATE: 92 BPM | RESPIRATION RATE: 18 BRPM | OXYGEN SATURATION: 100 %

## 2017-05-20 PROCEDURE — 96372 THER/PROPH/DIAG INJ SC/IM: CPT

## 2017-05-20 PROCEDURE — 74011000250 HC RX REV CODE- 250: Performed by: INTERNAL MEDICINE

## 2017-05-20 PROCEDURE — 74011250636 HC RX REV CODE- 250/636: Performed by: INTERNAL MEDICINE

## 2017-05-20 RX ORDER — HEPARIN 100 UNIT/ML
500 SYRINGE INTRAVENOUS AS NEEDED
Status: DISCONTINUED | OUTPATIENT
Start: 2017-05-20 | End: 2017-05-20

## 2017-05-20 RX ORDER — HEPARIN 100 UNIT/ML
500 SYRINGE INTRAVENOUS AS NEEDED
Status: ACTIVE | OUTPATIENT
Start: 2017-05-20 | End: 2017-05-21

## 2017-05-20 RX ORDER — SODIUM CHLORIDE 0.9 % (FLUSH) 0.9 %
10 SYRINGE (ML) INJECTION AS NEEDED
Status: DISCONTINUED | OUTPATIENT
Start: 2017-05-20 | End: 2017-05-20

## 2017-05-20 RX ORDER — SODIUM CHLORIDE 0.9 % (FLUSH) 0.9 %
10 SYRINGE (ML) INJECTION AS NEEDED
Status: ACTIVE | OUTPATIENT
Start: 2017-05-20 | End: 2017-05-21

## 2017-05-20 RX ADMIN — LIDOCAINE HYDROCHLORIDE 1 G: 10 INJECTION, SOLUTION INFILTRATION; PERINEURAL at 10:46

## 2017-05-21 ENCOUNTER — HOSPITAL ENCOUNTER (OUTPATIENT)
Dept: INFUSION THERAPY | Age: 62
Discharge: HOME | End: 2017-05-21
Payer: MEDICAID

## 2017-05-21 RX ORDER — HEPARIN 100 UNIT/ML
500 SYRINGE INTRAVENOUS AS NEEDED
Status: ACTIVE | OUTPATIENT
Start: 2017-05-21 | End: 2017-05-22

## 2017-05-21 RX ORDER — SODIUM CHLORIDE 0.9 % (FLUSH) 0.9 %
10-40 SYRINGE (ML) INJECTION AS NEEDED
Status: ACTIVE | OUTPATIENT
Start: 2017-05-21 | End: 2017-05-22

## 2017-05-22 ENCOUNTER — HOSPITAL ENCOUNTER (OUTPATIENT)
Dept: INFUSION THERAPY | Age: 62
Discharge: HOME | End: 2017-05-22
Payer: MEDICAID

## 2017-05-22 ENCOUNTER — PATIENT OUTREACH (OUTPATIENT)
Dept: INTERNAL MEDICINE CLINIC | Age: 62
End: 2017-05-22

## 2017-05-22 LAB
CRYOGLOB SER QL 1D COLD INC: ABNORMAL
Lab: 1 %
Lab: ABNORMAL

## 2017-05-22 RX ORDER — ERTAPENEM 1 G/1
1 INJECTION, POWDER, LYOPHILIZED, FOR SOLUTION INTRAMUSCULAR; INTRAVENOUS EVERY 24 HOURS
Status: DISCONTINUED | OUTPATIENT
Start: 2017-05-22 | End: 2017-05-22 | Stop reason: SDUPTHER

## 2017-05-22 NOTE — PROGRESS NOTES
Transition of care  Patient on discharge report dated . The patient was discharged from [facility] for treatment of cellulitis RLE. The patient is scheduled to receive antibiotics at our [facility]. There is an [facility] appointment set for today. Plan: history of unsuccessful outreach. Will attempt to interact with the patient @ the [address] [place] [address]. The patient was a no show for today's [facility] appointment. Navigator contacted the patient by telephone. Spoke with the patient's daughter Jayda Tobin. Jayda Tobin is not listed on the patient's HIPAA. She was able to verify the patient's  and address. I suggested to Jayda Tobin to have her name listed on the permission to release for because it is difficult to reach her sister, Twan Brooks. She agrees to do so. And requests assistance with changing the  PCP appointment time of the to  later in the afternoon. An earlier PCP appointment has been negotiated for [id number].   Plan: meet the patient during the PCP office appointment. Assess for barriers to care. Need to complete post-discharge assessment.

## 2017-05-23 ENCOUNTER — HOSPITAL ENCOUNTER (OUTPATIENT)
Dept: INFUSION THERAPY | Age: 62
Discharge: HOME | End: 2017-05-23
Payer: MEDICAID

## 2017-05-23 VITALS
RESPIRATION RATE: 18 BRPM | TEMPERATURE: 97.3 F | SYSTOLIC BLOOD PRESSURE: 151 MMHG | HEART RATE: 92 BPM | DIASTOLIC BLOOD PRESSURE: 89 MMHG

## 2017-05-23 PROCEDURE — 74011000250 HC RX REV CODE- 250: Performed by: INTERNAL MEDICINE

## 2017-05-23 PROCEDURE — 96372 THER/PROPH/DIAG INJ SC/IM: CPT

## 2017-05-23 PROCEDURE — 74011250636 HC RX REV CODE- 250/636: Performed by: INTERNAL MEDICINE

## 2017-05-23 RX ADMIN — LIDOCAINE HYDROCHLORIDE 1 G: 10 INJECTION, SOLUTION EPIDURAL; INFILTRATION; INTRACAUDAL; PERINEURAL at 16:30

## 2017-05-23 NOTE — PROGRESS NOTES
[facility]fusion [facility] Progress Note    4996 Pt admit to [facility] for daily Invanz ambulatory in stable condition with daughter. Assessment completed. No new concerns voiced. Visit Vitals    /89 (BP 1 Location: Right arm, BP Patient Position: At rest;Sitting)    Pulse 92    Temp 97.3 °F (36.3 °C)    Resp 18       Medications:  Invanz IM left glute    1635 Pt tolerated treatment well. D/c home ambulatory in no distress. Pt aware of next appointment scheduled for 5/24/17.

## 2017-05-24 ENCOUNTER — HOSPITAL ENCOUNTER (OUTPATIENT)
Dept: INFUSION THERAPY | Age: 62
Discharge: HOME | End: 2017-05-24
Payer: MEDICAID

## 2017-06-13 ENCOUNTER — HOSPITAL ENCOUNTER (OUTPATIENT)
Dept: INFUSION THERAPY | Age: 62
End: 2017-06-13

## 2017-09-06 RX ORDER — IBUPROFEN 800 MG/1
TABLET ORAL
Qty: 90 TAB | Refills: 7 | Status: SHIPPED | OUTPATIENT
Start: 2017-09-06

## 2017-09-06 RX ORDER — PROPRANOLOL HYDROCHLORIDE 10 MG/1
TABLET ORAL
Qty: 60 TAB | Refills: 1 | Status: SHIPPED | OUTPATIENT
Start: 2017-09-06 | End: 2017-10-15

## 2017-09-06 RX ORDER — PEN NEEDLE, DIABETIC 31 GX5/16"
NEEDLE, DISPOSABLE MISCELLANEOUS
Qty: 100 PEN NEEDLE | Refills: 12 | Status: SHIPPED | OUTPATIENT
Start: 2017-09-06

## 2017-09-06 RX ORDER — SPIRONOLACTONE 25 MG/1
TABLET ORAL
Qty: 90 TAB | Refills: 1 | Status: SHIPPED | OUTPATIENT
Start: 2017-09-06 | End: 2017-10-15

## 2017-09-06 RX ORDER — LANCETS 33 GAUGE
EACH MISCELLANEOUS
Qty: 100 LANCET | Refills: 3 | Status: SHIPPED | OUTPATIENT
Start: 2017-09-06

## 2017-09-06 RX ORDER — CALCIUM CITRATE/VITAMIN D3 200MG-6.25
TABLET ORAL
Qty: 50 STRIP | Refills: 3 | Status: SHIPPED | OUTPATIENT
Start: 2017-09-06

## 2017-10-15 ENCOUNTER — HOSPITAL ENCOUNTER (EMERGENCY)
Age: 62
Discharge: HOME | End: 2017-10-15
Attending: EMERGENCY MEDICINE
Payer: MEDICAID

## 2017-10-15 VITALS
OXYGEN SATURATION: 100 % | RESPIRATION RATE: 19 BRPM | DIASTOLIC BLOOD PRESSURE: 89 MMHG | HEART RATE: 101 BPM | SYSTOLIC BLOOD PRESSURE: 174 MMHG | WEIGHT: 186.51 LBS | BODY MASS INDEX: 30.1 KG/M2 | TEMPERATURE: 98.7 F

## 2017-10-15 DIAGNOSIS — R18.8 OTHER ASCITES: ICD-10-CM

## 2017-10-15 DIAGNOSIS — Z91.14 H/O MEDICATION NONCOMPLIANCE: ICD-10-CM

## 2017-10-15 DIAGNOSIS — S81.801D WOUND OF RIGHT LOWER EXTREMITY, SUBSEQUENT ENCOUNTER: Primary | ICD-10-CM

## 2017-10-15 LAB
ALBUMIN SERPL-MCNC: 1.7 G/DL (ref 3.5–5)
ALBUMIN/GLOB SERPL: 0.2 {RATIO} (ref 1.1–2.2)
ALP SERPL-CCNC: 104 U/L (ref 45–117)
ALT SERPL-CCNC: 46 U/L (ref 12–78)
ANION GAP SERPL CALC-SCNC: 6 MMOL/L (ref 5–15)
APPEARANCE UR: ABNORMAL
AST SERPL-CCNC: 80 U/L (ref 15–37)
BACTERIA URNS QL MICRO: ABNORMAL /HPF
BASOPHILS # BLD: 0 K/UL (ref 0–0.1)
BASOPHILS NFR BLD: 0 % (ref 0–1)
BILIRUB SERPL-MCNC: 0.6 MG/DL (ref 0.2–1)
BILIRUB UR QL CFM: NEGATIVE
BUN SERPL-MCNC: 23 MG/DL (ref 6–20)
BUN/CREAT SERPL: 23 (ref 12–20)
CALCIUM SERPL-MCNC: 7.4 MG/DL (ref 8.5–10.1)
CHLORIDE SERPL-SCNC: 116 MMOL/L (ref 97–108)
CO2 SERPL-SCNC: 20 MMOL/L (ref 21–32)
COLOR UR: ABNORMAL
CREAT SERPL-MCNC: 1.01 MG/DL (ref 0.55–1.02)
EOSINOPHIL # BLD: 0.1 K/UL (ref 0–0.4)
EOSINOPHIL NFR BLD: 2 % (ref 0–7)
EPITH CASTS URNS QL MICRO: ABNORMAL /LPF
ERYTHROCYTE [DISTWIDTH] IN BLOOD BY AUTOMATED COUNT: 18.1 % (ref 11.5–14.5)
GLOBULIN SER CALC-MCNC: 7.4 G/DL (ref 2–4)
GLUCOSE SERPL-MCNC: 185 MG/DL (ref 65–100)
GLUCOSE UR STRIP.AUTO-MCNC: NEGATIVE MG/DL
HCT VFR BLD AUTO: 26.5 % (ref 35–47)
HGB BLD-MCNC: 8.7 G/DL (ref 11.5–16)
HGB UR QL STRIP: ABNORMAL
KETONES UR QL STRIP.AUTO: NEGATIVE MG/DL
LEUKOCYTE ESTERASE UR QL STRIP.AUTO: ABNORMAL
LIPASE SERPL-CCNC: 488 U/L (ref 73–393)
LYMPHOCYTES # BLD: 1.3 K/UL (ref 0.8–3.5)
LYMPHOCYTES NFR BLD: 29 % (ref 12–49)
MCH RBC QN AUTO: 30.4 PG (ref 26–34)
MCHC RBC AUTO-ENTMCNC: 32.8 G/DL (ref 30–36.5)
MCV RBC AUTO: 92.7 FL (ref 80–99)
MONOCYTES # BLD: 0.3 K/UL (ref 0–1)
MONOCYTES NFR BLD: 6 % (ref 5–13)
NEUTS SEG # BLD: 2.8 K/UL (ref 1.8–8)
NEUTS SEG NFR BLD: 63 % (ref 32–75)
NITRITE UR QL STRIP.AUTO: NEGATIVE
PH UR STRIP: 6 [PH] (ref 5–8)
PLATELET # BLD AUTO: 126 K/UL (ref 150–400)
POTASSIUM SERPL-SCNC: 3.2 MMOL/L (ref 3.5–5.1)
PROT SERPL-MCNC: 9.1 G/DL (ref 6.4–8.2)
PROT UR STRIP-MCNC: 100 MG/DL
RBC # BLD AUTO: 2.86 M/UL (ref 3.8–5.2)
RBC #/AREA URNS HPF: ABNORMAL /HPF (ref 0–5)
SODIUM SERPL-SCNC: 142 MMOL/L (ref 136–145)
SP GR UR REFRACTOMETRY: 1.02 (ref 1–1.03)
UROBILINOGEN UR QL STRIP.AUTO: 1 EU/DL (ref 0.2–1)
WBC # BLD AUTO: 4.5 K/UL (ref 3.6–11)
WBC URNS QL MICRO: ABNORMAL /HPF (ref 0–4)

## 2017-10-15 PROCEDURE — 99285 EMERGENCY DEPT VISIT HI MDM: CPT

## 2017-10-15 PROCEDURE — 81001 URINALYSIS AUTO W/SCOPE: CPT | Performed by: EMERGENCY MEDICINE

## 2017-10-15 PROCEDURE — 83690 ASSAY OF LIPASE: CPT | Performed by: EMERGENCY MEDICINE

## 2017-10-15 PROCEDURE — 80053 COMPREHEN METABOLIC PANEL: CPT | Performed by: EMERGENCY MEDICINE

## 2017-10-15 PROCEDURE — 85025 COMPLETE CBC W/AUTO DIFF WBC: CPT | Performed by: EMERGENCY MEDICINE

## 2017-10-15 PROCEDURE — 36415 COLL VENOUS BLD VENIPUNCTURE: CPT | Performed by: EMERGENCY MEDICINE

## 2017-10-15 PROCEDURE — 74011250636 HC RX REV CODE- 250/636: Performed by: EMERGENCY MEDICINE

## 2017-10-15 PROCEDURE — 96374 THER/PROPH/DIAG INJ IV PUSH: CPT

## 2017-10-15 RX ORDER — SPIRONOLACTONE 25 MG/1
TABLET ORAL
Qty: 30 TAB | Refills: 1 | Status: SHIPPED | OUTPATIENT
Start: 2017-10-15 | End: 2017-10-15

## 2017-10-15 RX ORDER — MORPHINE SULFATE 2 MG/ML
4 INJECTION, SOLUTION INTRAMUSCULAR; INTRAVENOUS
Status: COMPLETED | OUTPATIENT
Start: 2017-10-15 | End: 2017-10-15

## 2017-10-15 RX ORDER — PROPRANOLOL HYDROCHLORIDE 10 MG/1
TABLET ORAL
Qty: 60 TAB | Refills: 1 | Status: SHIPPED | OUTPATIENT
Start: 2017-10-15

## 2017-10-15 RX ORDER — LACTULOSE 10 G/15ML
10 SOLUTION ORAL; RECTAL 2 TIMES DAILY
Qty: 480 ML | Refills: 0 | Status: SHIPPED | OUTPATIENT
Start: 2017-10-15

## 2017-10-15 RX ORDER — LACTULOSE 10 G/15ML
10 SOLUTION ORAL; RECTAL 2 TIMES DAILY
Qty: 480 ML | Refills: 0 | Status: SHIPPED | OUTPATIENT
Start: 2017-10-15 | End: 2017-10-15

## 2017-10-15 RX ORDER — DOXYCYCLINE HYCLATE 100 MG
100 TABLET ORAL 2 TIMES DAILY
Qty: 14 TAB | Refills: 0 | Status: SHIPPED | OUTPATIENT
Start: 2017-10-15 | End: 2017-10-15

## 2017-10-15 RX ORDER — SODIUM CHLORIDE 0.9 % (FLUSH) 0.9 %
5-10 SYRINGE (ML) INJECTION AS NEEDED
Status: DISCONTINUED | OUTPATIENT
Start: 2017-10-15 | End: 2017-10-16 | Stop reason: HOSPADM

## 2017-10-15 RX ORDER — DOXYCYCLINE HYCLATE 100 MG
100 TABLET ORAL 2 TIMES DAILY
Qty: 14 TAB | Refills: 0 | Status: SHIPPED | OUTPATIENT
Start: 2017-10-15 | End: 2017-10-22

## 2017-10-15 RX ORDER — OXYCODONE HYDROCHLORIDE 10 MG/1
10 TABLET ORAL
Qty: 12 TAB | Refills: 0 | Status: SHIPPED | OUTPATIENT
Start: 2017-10-15

## 2017-10-15 RX ORDER — SPIRONOLACTONE 25 MG/1
TABLET ORAL
Qty: 30 TAB | Refills: 1 | Status: SHIPPED | OUTPATIENT
Start: 2017-10-15

## 2017-10-15 RX ORDER — PROPRANOLOL HYDROCHLORIDE 10 MG/1
TABLET ORAL
Qty: 60 TAB | Refills: 1 | Status: SHIPPED | OUTPATIENT
Start: 2017-10-15 | End: 2017-10-15

## 2017-10-15 RX ADMIN — MORPHINE SULFATE 4 MG: 2 INJECTION, SOLUTION INTRAMUSCULAR; INTRAVENOUS at 16:36

## 2017-10-15 NOTE — ED PROVIDER NOTES
[facility].  EMERGENCY DEPARTMENT HISTORY AND PHYSICAL EXAM       Date of Service: 10/15/2017   Patient Name: Dick Espinoza   YOB: 1955  Medical Record Number: [id number]    History of Presenting Illness     Chief Complaint   Patient presents with    Abdominal Pain     pt reports abdominal pain and swelling    Skin Problem     reports open wounds to lateral aspect of right leg. hx diabetes    Breast Problem     pt states, \"my breast is gone\" right breast noted to be smaller than left breast.        History Provided By:  patient, medical records, and daughter    Additional History:   Dick Espinoza is a 60 y.o. female with PMHx significant for HTN, HIV, DM, hepatitis C, liver cirrhosis, and ascites who presents ambulatory to the ED with cc of a progressively worsening wound to her right leg with associated malodorous green/yellow drainage x 2 days. She also c/o associated progressively worsening swelling to her BL legs x 2 days. The patient also c/o generalized abdominal pain with associated distention x 2 weeks. Per daughter, the patient had a fever 6 days ago, and she also appears to be more confused. Per medical records, the patient admitted from 5/10/17-5/19/17 at [facility] for cellulitis to her right leg with positive cryoglobulin. Per medical records, the patient denied to have her right leg re-biopsied. The patient also c/o that her right breast has become noticeably smaller than her left breast; she denies any breast pain. The patient states that she currently lives with her daughter and granddaughter. Per daughter, the patient denies having a home health nurse and wound care. She specifically denies all other complaints at this time. Social Hx: + Tobacco (0.5 PPD), - EtOH, - Illicit Drugs    There are no other complaints, changes or physical findings at this time.     Primary Care Provider: Zohreh Quiroz MD   Specialist: Eli Beth Abraham Retana MD (GI)    Past History     Past Medical History:   Past Medical History:   Diagnosis Date    Allergic rhinitis, cause unspecified 7/21/2011    Arthritis 7/21/2011    Asthma     Burning with urination     Chronic hepatitis C without mention of hepatic coma 7/21/2011    Degenerative Disc Disease 7/21/2011    Degenerative Joint Disease 7/21/2011    Diabetes ([facility]     Type II    HIV (human immunodeficiency virus infection) ([facility] 7/21/2011    Hypertension     Insomnia 7/21/2011    Multiple open wounds of lower leg 12/3/2015    Pulmonary nodules 7/21/2011        Past Surgical History:   Past Surgical History:   Procedure Laterality Date    HX DILATION AND CURETTAGE  1970    HX GYN  1998    partial hystecomy    HX ORTHOPAEDIC      pins/screws in left leg    HX TUBAL LIGATION          Family History:   Family History   Problem Relation Age of Onset    Diabetes Mother     Hypertension Mother     Diabetes Father         Social History:   Social History   Substance Use Topics    Smoking status: Current Every Day Smoker     Packs/day: 0.50     Years: 15.00     Types: Cigarettes    Smokeless tobacco: Never Used    Alcohol use No        Allergies: Allergies   Allergen Reactions    Codeine Itching     Patient denies Codeine allergy    Penicillin V Hives     Patient denies penicillin allergy. Tolerated Merrem    Pollen Extracts Runny Nose     Itchy, headaches, sneezing         Review of Systems   Review of Systems   Constitutional: Negative for fatigue and fever. HENT: Negative. Eyes: Negative. Respiratory: Negative for shortness of breath and wheezing. Cardiovascular: Positive for leg swelling. Negative for chest pain. Gastrointestinal: Positive for abdominal distention and abdominal pain. Negative for blood in stool, constipation, diarrhea, nausea and vomiting. Endocrine: Negative. Genitourinary: Negative for difficulty urinating and dysuria.    Musculoskeletal:        (-) Decreased right breast size   Skin: Positive for wound (RLE). Negative for rash. Allergic/Immunologic: Negative. Neurological: Negative for weakness and numbness. Hematological: Negative. Psychiatric/Behavioral: Negative. Physical Exam  Physical Exam   Constitutional: She is oriented to person, place, and time. She appears well-developed and well-nourished. No distress. HENT:   Head: Normocephalic and atraumatic. Mouth/Throat: Oropharynx is clear and moist.   Eyes: Conjunctivae and EOM are normal.   Neck: Neck supple. No JVD present. No tracheal deviation present. Cardiovascular: Regular rhythm and intact distal pulses. Tachycardia present. Exam reveals no gallop and no friction rub. No murmur heard. Pulmonary/Chest: Effort normal and breath sounds normal. No stridor. No respiratory distress. She has no wheezes. Decreased tissue mass to right breast; no palpable masses. Normal alveoli. Abdominal: Soft. Bowel sounds are normal. She exhibits distension and fluid wave. She exhibits no mass. There is no tenderness. There is no guarding. Musculoskeletal: Normal range of motion. She exhibits no tenderness. +2 pitting edema to BL LEs. Neurological: She is alert and oriented to person, place, and time. She has normal strength. No focal deficits   Skin: Skin is warm, dry and intact. No rash noted. 2 ulcerated dry lesions to right lateral leg. No active drainage or surrounding erythema. Mild odor. Very TTP. Psychiatric: She has a normal mood and affect. Her behavior is normal. Judgment and thought content normal.   Nursing note and vitals reviewed. Medical Decision Making   I am the first provider for this patient. I reviewed the vital signs, available nursing notes, past medical history, past surgical history, family history and social history. Old Medical Records: Old medical records. Nursing notes.      Provider Notes:   Pt with a hx of HIV, Hepatitis C, chronic leg wound, presenting with abdominal distention and R leg wound. Per chart review, patient was last admitted in May 2017 to [facility], and was discharged on antibiotics and instructed to follow up with ID and Surgery for biopsy of leg wound. Pt was lost to follow up and is now off all her medications except for insulin. Pt with no abdominal pain, low suspicion or SBP. Leg wound has no surround erythema or purulent drainage--no s/s of active infection. Wound appears chronic, and pain likely worsening secondary to worsening peripheral edema as patient has been off her medications. Will check labs to al for worsening liver failure, electrolyte abnormality, anemia. ED Course:  3:14 PM   Initial assessment performed. The patients presenting problems have been discussed, and they are in agreement with the care plan formulated and outlined with them. I have encouraged them to ask questions as they arise throughout their visit. Progress Note:  5:30 PM  Had a long discussion with the patient's daughter regarding the patient's lab results. She conveys her understanding of these results. The patient's daughter states that she did not follow up with Infectious Disease after her discharge from the hospital. The patient's daughter was informed that the patient needs to follow up with her Infectious Disease doctor to have her wound biopsied. Informed the patient's daughter that she will be re-started on Doxycycline. Informed the patient's daughter that the patient does not need paracentesis at this time. She was also informed that the patient needs to take her prescription of Lactulose as that will help with her constipation/abdominal pain. Will discharge the patient and provide her with follow up for Infectious Disease and Hepatology. Written by DILIP Murphy, as dictated by Penelope Zaragoza DO.     Progress Note:  6:05 PM  Spoke with the patient, and she states that she did not follow up with any sub-specialties after her hospital discharge in May 2017. She reports that she has run out of all of her medications except for insulin. Written by Chioma Villalba ED Scribrenny, as dictated by Haley Fatima DO. Progress Note:  7:00 PM  Will discharge the patient. Awaiting for the patient's daughter to come into the ED to give discharge instructions. Written by DILIP Pachecoibrenny, as dictated by Haley Fatima DO. Progress Note:  8:27 PM  The patient's initial prescription of Doxycycline and refill medications of Propanolol, Aldactone, and Lactulose was sent to the wrong pharmacy. Called the pharmacy to cancel these prescriptions as they will re-prescribed to the correct Pharmacy, [facility] on [address] [place] [address]. Written by DILIP Pacheco, as dictated by Haley Fatima DO. Diagnostic Study Results     Labs -      Recent Results (from the past 12 hour(s))   CBC WITH AUTOMATED DIFF    Collection Time: 10/15/17  2:13 PM   Result Value Ref Range    WBC 4.5 3.6 - 11.0 K/uL    RBC 2.86 (L) 3.80 - 5.20 M/uL    HGB 8.7 (L) 11.5 - 16.0 g/dL    HCT 26.5 (L) 35.0 - 47.0 %    MCV 92.7 80.0 - 99.0 FL    MCH 30.4 26.0 - 34.0 PG    MCHC 32.8 30.0 - 36.5 g/dL    RDW 18.1 (H) 11.5 - 14.5 %    PLATELET 143 (L) 710 - 400 K/uL    NEUTROPHILS 63 32 - 75 %    LYMPHOCYTES 29 12 - 49 %    MONOCYTES 6 5 - 13 %    EOSINOPHILS 2 0 - 7 %    BASOPHILS 0 0 - 1 %    ABS. NEUTROPHILS 2.8 1.8 - 8.0 K/UL    ABS. LYMPHOCYTES 1.3 0.8 - 3.5 K/UL    ABS. MONOCYTES 0.3 0.0 - 1.0 K/UL    ABS. EOSINOPHILS 0.1 0.0 - 0.4 K/UL    ABS.  BASOPHILS 0.0 0.0 - 0.1 K/UL   METABOLIC PANEL, COMPREHENSIVE    Collection Time: 10/15/17  2:13 PM   Result Value Ref Range    Sodium 142 136 - 145 mmol/L    Potassium 3.2 (L) 3.5 - 5.1 mmol/L    Chloride 116 (H) 97 - 108 mmol/L    CO2 20 (L) 21 - 32 mmol/L    Anion gap 6 5 - 15 mmol/L    Glucose 185 (H) 65 - 100 mg/dL    BUN 23 (H) 6 - 20 MG/DL    Creatinine 1.01 0.55 - 1.02 MG/DL    BUN/Creatinine ratio 23 (H) 12 - 20      GFR est AA >60 >60 ml/min/1.73m2    GFR est non-AA 55 (L) >60 ml/min/1.73m2    Calcium 7.4 (L) 8.5 - 10.1 MG/DL    Bilirubin, total 0.6 0.2 - 1.0 MG/DL    ALT (SGPT) 46 12 - 78 U/L    AST (SGOT) 80 (H) 15 - 37 U/L    Alk. phosphatase 104 45 - 117 U/L    Protein, total 9.1 (H) 6.4 - 8.2 g/dL    Albumin 1.7 (L) 3.5 - 5.0 g/dL    Globulin 7.4 (H) 2.0 - 4.0 g/dL    A-G Ratio 0.2 (L) 1.1 - 2.2     LIPASE    Collection Time: 10/15/17  2:13 PM   Result Value Ref Range    Lipase 488 (H) 73 - 393 U/L   URINALYSIS W/ RFLX MICROSCOPIC    Collection Time: 10/15/17  3:01 PM   Result Value Ref Range    Color DARK YELLOW      Appearance CLOUDY (A) CLEAR      Specific gravity 1.025 1.003 - 1.030      pH (UA) 6.0 5.0 - 8.0      Protein 100 (A) NEG mg/dL    Glucose NEGATIVE  NEG mg/dL    Ketone NEGATIVE  NEG mg/dL    Blood LARGE (A) NEG      Urobilinogen 1.0 0.2 - 1.0 EU/dL    Nitrites NEGATIVE  NEG      Leukocyte Esterase SMALL (A) NEG      WBC 10-20 0 - 4 /hpf    RBC 10-20 0 - 5 /hpf    Epithelial cells MODERATE (A) FEW /lpf    Bacteria 2+ (A) NEG /hpf   BILIRUBIN, CONFIRM    Collection Time: 10/15/17  3:01 PM   Result Value Ref Range    Bilirubin UA, confirm NEGATIVE  NEG         Vital Signs-Reviewed the patient's vital signs. Patient Vitals for the past 12 hrs:   Temp Pulse Resp BP SpO2   10/15/17 1900 - (!) 101 19 174/89 100 %   10/15/17 1834 - 100 16 (!) 183/92 100 %   10/15/17 1830 - 100 18 (!) 183/92 100 %   10/15/17 1800 - 99 18 184/90 100 %   10/15/17 1730 - (!) 101 17 178/84 100 %   10/15/17 1645 - (!) 106 18 (!) 186/93 100 %   10/15/17 1638 - (!) 105 22 (!) 187/102 100 %   10/15/17 1313 98.7 °F (37.1 °C) (!) 116 20 164/90 100 %       Medications Given in the ED:  Medications   sodium chloride (NS) flush 5-10 mL (not administered)   morphine injection 4 mg (4 mg IntraVENous Given 10/15/17 1636)       Diagnosis   Clinical Impression:   1. Wound of right lower extremity, subsequent encounter    2.  Other ascites    3. H/O medication noncompliance         Plan:  1:   Follow-up Information     Follow up With Details Comments Contact Info    Wing Emily MD Schedule an appointment as soon as possible for a visit in 1 day  [address] [facility] Abhi Mckeon MD Schedule an appointment as soon as possible for a visit in 1 day To follow up on HIV, hepatitis C [address] [facility] [address]  [facility] [address] [facility]      Kirill Schmitt MD Schedule an appointment as soon as possible for a visit To get a biopsy of your leg wound [facility] [address] [place] Schedule an appointment as soon as possible for a visit in 1 day  [address] [facility] [address]   [address] [facility]    Thaddeus Hawk MD Schedule an appointment as soon as possible for a visit  [facility] [address]  [phone]          2:   Discharge Medication List as of 10/15/2017  8:27 PM      START taking these medications    Details   doxycycline (VIBRA-TABS) 100 mg tablet Take 1 Tab by mouth two (2) times a day for 7 days. , Normal, Disp-14 Tab, R-0         CONTINUE these medications which have CHANGED    Details   propranolol (INDERAL) 10 mg tablet take 1 tablet by mouth twice a day, Normal, Disp-60 Tab, R-1      spironolactone (ALDACTONE) 25 mg tablet take 3 tablets by mouth once daily, Normal, Disp-30 Tab, R-1      lactulose (CHRONULAC) 10 gram/15 mL solution Take 15 mL by mouth two (2) times a day., Normal, Disp-480 mL, R-0      oxyCODONE IR (ROXICODONE) 10 mg tab immediate release tablet Take 1 Tab by mouth every four (4) hours as needed.  Max Daily Amount: 60 mg., Print, Disp-12 Tab, R-0         CONTINUE these medications which have NOT CHANGED    Details   BD INSULIN PEN NEEDLE UF MINI 31 gauge x 3/16\" ndle USE AS DIRECTED WITH INSULIN PEN, Normal, Disp-100 Pen Needle, R-12      ibuprofen (MOTRIN) 800 mg tablet take 1 tablet by mouth every 8 hours if needed for pain, Normal, Disp-90 Tab, R-7      TRUE METRIX GLUCOSE TEST STRIP strip TEST three times a day, Normal, Disp-50 Strip, R-3      TRUEPLUS LANCETS 33 gauge misc TEST THREE TIMES A DAY, Normal, Disp-100 Lancet, R-3      ertapenem 625 mg/2 mL in lidocaine injection 3.2 mL by IntraMUSCular route daily. Saturday 5/20 10:30 at [address]  Sunday 5/21 10:30am [address]  Monday 5/22 10:00am [facility] [place]  Tuesday 5/23 10:00am [address] [facility] [place]  Wednesday 5/24 11:0 0am [facility], No Print, Disp-5 g, R-0      rifAXIMin (XIFAXAN) 550 mg tablet Take 1 Tab by mouth two (2) times a day., Print, Disp-60 Tab, R-2      amLODIPine (NORVASC) 10 mg tablet Take 10 mg by mouth daily. , Historical Med      insulin glulisine (APIDRA) 100 unit/mL injection 10 Units by SubCUTAneous route Daily (before breakfast). , Historical Med      ferrous sulfate 325 mg (65 mg iron) tablet Take 1 Tab by mouth two (2) times daily (with meals). , Print, Disp-60 Tab, R-1      insulin lispro (HUMALOG KWIKPEN) 100 unit/mL kwikpen 1 Units by SubCUTAneous route Before breakfast, lunch, dinner and at bedtime. 150-200=2 nits  201-250=4 units  251-300=6 units  301-350=8 units  351-400=10 units  More than 400 call Kashif STRANGE, Disp-15 mL, R-1      triamcinolone (ARISTOCORT) 0.5 % topical cream Apply  to affected area two (2) times a day.  use thin layer, Normal, Disp-45 g, R-5      SPIRIVA WITH HANDIHALER 18 mcg inhalation capsule inhale the contents of one capsule in the handihaler once daily, Normal, Disp-30 Cap, R-2      albuterol (PROVENTIL HFA, VENTOLIN HFA, PROAIR HFA) 90 mcg/actuation inhaler Take 2 puffs by inhalation every four (4) hours as needed for Wheezing., Normal, Disp-1 Inhaler, R-12      fluticasone-salmeterol (ADVAIR) 250-50 mcg/dose diskus inhaler Take 1 Puff by inhalation two (2) times a day., Normal, Disp-1 Inhaler, R-12      omega-3 fatty acids-vitamin e (FISH OIL) 1,000 mg Cap Take 1 Cap by mouth every other day., Historical Med      ascorbic acid (VITAMIN C) 1,000 mg tablet Take 1,000 mg by mouth every other day., Historical Med           Return to ED if Worse    Disposition Note:  Discharge Note:  7:00 PM  The pt is ready for discharge. The pt's signs, symptoms, diagnosis, and discharge instructions have been discussed and pt has conveyed their understanding. The pt is to follow up as recommended or return to ER should their symptoms worsen. Plan has been discussed and pt is in agreement. This note is prepared by Thaddeus Cage, acting as a Scribe for Eloina Gipson DO. Eloina Gipson DO: The scribe's documentation has been prepared under my direction and personally reviewed by me in its entirety. I confirm that the notes above accurately reflects all work, treatment, procedures, and medical decision making performed by me. This note will not be viewable in [address]

## 2017-10-15 NOTE — ED NOTES
Assumed care of patient from [address] [place] [address] Pkwy. Patient resting comfortably in no apparent distress. On monitor x 3. Call bell within reach. Plan of care discussed. Waiting on daughter to arrive for discharge.

## 2017-10-15 NOTE — ED NOTES
Change of shift report to Addie Wright await pt daughter to come in have not taken iv right ac out yet as pt a difficult stick

## 2017-10-15 NOTE — DISCHARGE INSTRUCTIONS
Ascites: Care Instructions  Your Care Instructions  Ascites (say \"uh-SY-teez\") is a buildup of extra fluid in the belly. It can cause your belly to swell. It can also make it hard for you to breathe. Many diseases can cause ascites. But most people who get it have a liver problem. When the liver gets damaged, it can cause fluid to back up from the liver or from blood vessels. Then this fluid builds up in the belly. Your doctor may take a sample of the fluid in your belly with a thin needle. The sample is then tested to help find out the cause of the ascites. Treatment may include medicine. It may also include changing the way you eat so you don't eat a lot of salt. If your ascites is very bad and hard to treat, your doctor may need to use a needle to take out the fluid. Follow-up care is a key part of your treatment and safety. Be sure to make and go to all appointments, and call your doctor if you are having problems. It's also a good idea to know your test results and keep a list of the medicines you take. How can you care for yourself at home? · Be safe with medicines. Take your medicines exactly as prescribed. Call your doctor if you have any problems with your medicine. You will get more details on the specific medicines your doctor prescribes. · Eat low-salt foods, and don't add salt to your food. If you eat a lot of salt, it's harder to get rid of the extra fluid. Salt is in many prepared foods. These include james, canned foods, snack foods, sauces, and soups. Look for products that are low-sodium or have reduced salt. · Do not drink any alcohol until you are all better. Alcohol will damage the liver more. If your liver disease is caused by drinking alcohol, do not drink alcohol at all. Tell your doctor if you need help to quit. Counseling, support groups, and sometimes medicines can help you stay sober. · Talk to your doctor before you take any other medicines.  These include over-the-counter medicines, vitamins, and herbal products. · Make sure your doctor knows all the medicines you take. Some medicines, such as acetaminophen (Tylenol), can make liver problems worse. When should you call for help? Call 911 anytime you think you may need emergency care. For example, call if:  · You have severe trouble breathing. Call your doctor now or seek immediate medical care if:  · You have new belly pain or swelling. · You have a fever not caused by the flu or some other illness that you know you have. · The swelling in your belly gets bigger, or you get a bulge near your belly button. Watch closely for changes in your health, and be sure to contact your doctor if:  · You would like help to plan a diet that protects your liver. Where can you learn more? Go to http://justin-ronnie.info/. Enter B970 in the search box to learn more about \"Ascites: Care Instructions. \"  Current as of: August 9, 2016  Content Version: 11.3  © 2870-1752 [employer]. Care instructions adapted under license by [employer] (which disclaims liability or warranty for this information). If you have questions about a medical condition or this instruction, always ask your healthcare professional. [address] any warranty or liability for your use of this information.

## 2017-10-15 NOTE — ED NOTES
Waiting for her daughter to get here. Dr. Amaris Alfredo wants to speak with pt and daughter when family is here.

## 2017-10-16 NOTE — ED NOTES
Discharge instructions reviewed with pt and copy given along with RX by Dr Albaro Brantley. All questions answered at this time. Pt discharged via wheelchair home with daughter.

## 2017-10-20 NOTE — PROGRESS NOTES
Pharmacy called. Patient's insurance does not cover Doxycycline. Will change to Clindamycin 300mg TID x 7 days.    Charis Holly

## 2017-10-26 ENCOUNTER — HOSPITAL ENCOUNTER (OUTPATIENT)
Dept: WOUND CARE | Age: 62
End: 2017-10-26

## 2020-02-05 NOTE — ED PROVIDER NOTES
HPI Comments: Ridge Sánchez is a 57 y.o. female with significant PMHx of hepatitis C, COPD, HIV, hypertension, and diabetes, presents ambulatory to the ED with c/o acute onset of sharp generalized abdominal pain x 1 week. Pt notes symptoms of constipation, abdominal distension, urinary frequency, \"red spots\" on her legs, bilateral lower leg swelling, decreased hearing from right ear, rhinorrhea, and cough. Family notes pt is pale. Pt reports her last bowel movement was 4 days ago and she normally has a bowel movement every day. She notes abdominal distension and urinary frequency for 2 days. Pt states she has noticed a \"hole\" in her right lower leg today when she woke up. She notes painful \"red spots\" on her bilateral lower legs along with bilateral lower leg swelling. She reports not being able to hear from her right ear due to \"sores\". Pt denies hx of thrombocytopenia, nausea, vomiting, diarrhea, dysuria, or injury. PCP: Juan R Rodriguez MD  PSHx significant for: hysterectomy  Social Hx: +tobacco (0.5 ppd), -EtOH    There are no other complaints, changes or physical findings at this time. Written by DILIP George, as dictated by [employer]      The history is provided by the patient and a relative. No  was used.         Past Medical History:   Diagnosis Date    Allergic rhinitis, cause unspecified 7/21/2011    Arthritis 7/21/2011    Asthma     Burning with urination     Chronic hepatitis C without mention of hepatic coma ([facility] [address] 7/21/2011    Degenerative Disc Disease 7/21/2011    Degenerative Joint Disease 7/21/2011    Diabetes ([address]     Type II    HIV (human immunodeficiency virus infection) ([facility] [address] 7/21/2011    Hypertension     Insomnia 7/21/2011    Multiple open wounds of lower leg 12/3/2015    Pulmonary nodules 7/21/2011       Past Surgical History:   Procedure Laterality Date    HX DILATION AND CURETTAGE  1970    HX GYN  1998    partial hystecomy  HX ORTHOPAEDIC      pins/screws in left leg    HX TUBAL LIGATION           Family History:   Problem Relation Age of Onset    Diabetes Mother     Hypertension Mother     Diabetes Father        Social History     Social History    Marital status: SINGLE     Spouse name: N/A    Number of children: N/A    Years of education: N/A     Occupational History    Not on file. Social History Main Topics    Smoking status: Current Every Day Smoker     Packs/day: 0.50     Years: 15.00     Types: Cigarettes    Smokeless tobacco: Never Used    Alcohol use No    Drug use: No    Sexual activity: Not Currently     Other Topics Concern    Not on file     Social History Narrative         ALLERGIES: Codeine; Penicillin v; and Pollen extracts    Review of Systems   Constitutional: Negative for appetite change, chills and fever. HENT: Positive for hearing loss (right ear) and rhinorrhea. Negative for congestion. Eyes: Negative for visual disturbance. Respiratory: Positive for cough. Negative for shortness of breath and wheezing. Cardiovascular: Positive for leg swelling (bilateral). Negative for chest pain and palpitations. Gastrointestinal: Positive for abdominal distention, abdominal pain (generalized) and constipation. Negative for diarrhea, nausea and vomiting. Genitourinary: Positive for frequency. Negative for dysuria and urgency. Musculoskeletal: Negative for back pain, joint swelling, myalgias and neck stiffness. Skin: Positive for pallor, rash (painful \"red spots\" on lower legs) and wound (\"hole\" on right lower leg). Neurological: Negative for dizziness, syncope, weakness and headaches. Hematological: Negative for adenopathy. Psychiatric/Behavioral: Negative for behavioral problems and dysphoric mood. All other systems reviewed and are negative.       Vitals:    03/25/17 0145 03/25/17 0200 03/25/17 0215 03/25/17 0220   BP: 153/72 143/71 147/67 152/76   Pulse: (!) 106 (!) 109 (!) 106 (!) 109   Resp: 16 15 14 17   Temp:    98.9 °F (37.2 °C)   SpO2: 94% 98% 97% 97%   Weight:       Height:                Physical Exam   Constitutional: She is oriented to person, place, and time. She appears well-developed and well-nourished. No distress. HENT:   Head: Normocephalic and atraumatic. Right Ear: External ear normal.   Left Ear: External ear normal.   Nose: Nose normal.   Mouth/Throat: Oropharynx is clear and moist. No oropharyngeal exudate. Eyes: Conjunctivae and EOM are normal. Pupils are equal, round, and reactive to light. Right eye exhibits no discharge. Left eye exhibits no discharge. No scleral icterus. Neck: Normal range of motion. Neck supple. No tracheal deviation present. Cardiovascular: Normal rate, regular rhythm, normal heart sounds and intact distal pulses. Exam reveals no gallop and no friction rub. No murmur heard. Pulmonary/Chest: Effort normal and breath sounds normal. No respiratory distress. She has no wheezes. She has no rales. She exhibits no tenderness. Abdominal: Soft. Bowel sounds are normal. She exhibits distension. She exhibits no mass. There is generalized tenderness. There is no rebound and no guarding. Genitourinary: Rectal exam shows anal tone normal.   Genitourinary Comments: No stool in rectal canal. Small amount of brown stool with no overt bleeding. Musculoskeletal: She exhibits no edema or tenderness. Lymphadenopathy:        Head (right side): Preauricular adenopathy present. Head (left side): Preauricular adenopathy present. She has no cervical adenopathy. Neurological: She is alert and oriented to person, place, and time. No cranial nerve deficit. Skin: Skin is warm and dry. Laceration and petechiae noted. No rash noted. No erythema. Well healed surgical scars on anterior left knee and lower leg. Laceration to right lower leg. Non blanching petechiae on bilateral feet   Psychiatric: She has a normal mood and affect.  Her behavior is normal.   Nursing note and vitals reviewed. MDM  Number of Diagnoses or Management Options  Anemia, unspecified type:   Diagnosis management comments:     DDx: internal bleeding, abdominal pain, hyperglycemia, pneumonia, constipation, GI bleed, petechiae       Amount and/or Complexity of Data Reviewed  Clinical lab tests: ordered and reviewed  Tests in the radiology section of CPT®: ordered and reviewed  Obtain history from someone other than the patient: yes (Family member)  Discuss the patient with other providers: yes (Hospitalist)    Patient Progress  Patient progress: stable    ED Course       Procedures     EKG interpretation: (Preliminary) 22:32  Rhythm: sinus tachycardia; and regular . Rate (approx.): 104. Written by Kieran Garibay ED Scribe, as dictated by Samira Gardner MD.    Procedure Note - Rectal Exam:   9:35 PM  Performed by: Nadeen Lepe  Chaperoned by: Raj Reynoso RN  Rectal exam performed. Small amount of brown stool was collected. Stool was Hemoccult tested, and there was no overt reading   The procedure took 1-15 minutes, and pt tolerated well. Written by DILIP Bravoibe, as dictated by Nadeen Lepe. Progress Note:  9:38 PM  Maryann Mabry MD, who has seen and evaluated pt. She advised to order imaging and additional labs. Progress Note:  10:49 PM  Spoke with pt regarding CT machine is not like the MRI machine. Pt should be fine and not need anxiety medicine. Progress Note:  11:37 PM  Discussed pt with Samira Gardner MD, she will take over care at this time. This note is prepared by Maribell Elise, acting as Scribe for Nadeen Lepe. BEATRIZ Hallman : The scribe's documentation has been prepared under my direction and personally reviewed by me in its entirety. I confirm that the note above accurately reflects all work, treatment, procedures, and medical decision making performed by me.     CONSULT NOTE:   2:30 AM  Samira Gardner MD spoke with Kendra Manzo MD,   Specialty: Hospitalist  Discussed pt's hx, disposition, and available diagnostic and imaging results. Reviewed care plans. Consultant will evaluate pt for admission. Written by DILIP Stahl, as dictated by Samira Gardner MD.    LABORATORY TESTS:  Recent Results (from the past 12 hour(s))   METABOLIC PANEL, COMPREHENSIVE    Collection Time: 03/24/17  8:27 PM   Result Value Ref Range    Sodium 142 136 - 145 mmol/L    Potassium 3.5 3.5 - 5.1 mmol/L    Chloride 113 (H) 97 - 108 mmol/L    CO2 18 (L) 21 - 32 mmol/L    Anion gap 11 5 - 15 mmol/L    Glucose 135 (H) 65 - 100 mg/dL    BUN 17 6 - 20 MG/DL    Creatinine 0.97 0.55 - 1.02 MG/DL    BUN/Creatinine ratio 18 12 - 20      GFR est AA >60 >60 ml/min/1.73m2    GFR est non-AA 58 (L) >60 ml/min/1.73m2    Calcium 7.5 (L) 8.5 - 10.1 MG/DL    Bilirubin, total 0.4 0.2 - 1.0 MG/DL    ALT (SGPT) 39 12 - 78 U/L    AST (SGOT) 69 (H) 15 - 37 U/L    Alk. phosphatase 130 (H) 45 - 117 U/L    Protein, total 8.1 6.4 - 8.2 g/dL    Albumin 2.0 (L) 3.5 - 5.0 g/dL    Globulin 6.1 (H) 2.0 - 4.0 g/dL    A-G Ratio 0.3 (L) 1.1 - 2.2     CBC WITH AUTOMATED DIFF    Collection Time: 03/24/17  8:27 PM   Result Value Ref Range    WBC 3.7 3.6 - 11.0 K/uL    RBC 2.37 (L) 3.80 - 5.20 M/uL    HGB 4.3 (LL) 11.5 - 16.0 g/dL    HCT 16.4 (LL) 35.0 - 47.0 %    MCV 69.2 (L) 80.0 - 99.0 FL    MCH 18.1 (L) 26.0 - 34.0 PG    MCHC 26.2 (L) 30.0 - 36.5 g/dL    RDW 19.5 (H) 11.5 - 14.5 %    PLATELET 383 036 - 260 K/uL    NEUTROPHILS 55 32 - 75 %    LYMPHOCYTES 32 12 - 49 %    MONOCYTES 10 5 - 13 %    EOSINOPHILS 2 0 - 7 %    BASOPHILS 1 0 - 1 %    ABS. NEUTROPHILS 2.0 1.8 - 8.0 K/UL    ABS. LYMPHOCYTES 1.2 0.8 - 3.5 K/UL    ABS. MONOCYTES 0.4 0.0 - 1.0 K/UL    ABS. EOSINOPHILS 0.1 0.0 - 0.4 K/UL    ABS.  BASOPHILS 0.0 0.0 - 0.1 K/UL    RBC COMMENTS MICROCYTOSIS  2+        RBC COMMENTS HYPOCHROMIA  2+        RBC COMMENTS TARGET CELLS  1+       TROPONIN I    Collection Time: 03/24/17  8:27 PM   Result Value Ref Range    Troponin-I, Qt. <0.04 <0.05 ng/mL   CK W/ CKMB & INDEX    Collection Time: 03/24/17  8:27 PM   Result Value Ref Range     26 - 192 U/L    CK - MB 1.3 <3.6 NG/ML    CK-MB Index 1.1 0 - 2.5     MAGNESIUM    Collection Time: 03/24/17  8:27 PM   Result Value Ref Range    Magnesium 2.3 1.6 - 2.4 mg/dL   URINALYSIS W/ REFLEX CULTURE    Collection Time: 03/24/17  9:26 PM   Result Value Ref Range    Color YELLOW/STRAW      Appearance CLEAR CLEAR      Specific gravity 1.016 1.003 - 1.030      pH (UA) 6.0 5.0 - 8.0      Protein TRACE (A) NEG mg/dL    Glucose NEGATIVE  NEG mg/dL    Ketone NEGATIVE  NEG mg/dL    Bilirubin NEGATIVE  NEG      Blood TRACE (A) NEG      Urobilinogen 1.0 0.2 - 1.0 EU/dL    Nitrites NEGATIVE  NEG      Leukocyte Esterase MODERATE (A) NEG      WBC 10-20 0 - 4 /hpf    RBC 0-5 0 - 5 /hpf    Epithelial cells FEW FEW /lpf    Bacteria NEGATIVE  NEG /hpf    UA:UC IF INDICATED URINE CULTURE ORDERED (A) CNI     TYPE & CROSSMATCH    Collection Time: 03/24/17  9:26 PM   Result Value Ref Range    Crossmatch Expiration 03/27/2017     ABO/Rh(D) O POSITIVE     Antibody screen NEG     Unit number [id number]     Blood component type RC LR AS1     Unit division 00     Status of unit ALLOCATED     Crossmatch result Compatible     Unit number [id number]     Blood component type RC LR AS1     Unit division 00     Status of unit ALLOCATED     Crossmatch result Compatible     Unit number [id number]     Blood component type RC LR AS1     Unit division 00     Status of unit ISSUED     Crossmatch result Compatible    LACTIC ACID, PLASMA    Collection Time: 03/24/17 10:17 PM   Result Value Ref Range    Lactic acid 1.0 0.4 - 2.0 MMOL/L   PROTHROMBIN TIME + INR    Collection Time: 03/24/17 10:22 PM   Result Value Ref Range    INR 1.4 (H) 0.9 - 1.1      Prothrombin time 14.5 (H) 9.0 - 11.1 sec   EKG, 12 LEAD, INITIAL Collection Time: 03/24/17 10:32 PM   Result Value Ref Range    Ventricular Rate 104 BPM    Atrial Rate 104 BPM    P-R Interval 160 ms    QRS Duration 70 ms    Q-T Interval 340 ms    QTC Calculation (Bezet) 447 ms    Calculated P Axis 60 degrees    Calculated R Axis 49 degrees    Calculated T Axis 38 degrees    Diagnosis       Sinus tachycardia  Low voltage QRS  Nonspecific T wave abnormality  When compared with ECG of 24-JUN-2014 16:43,  No significant change was found     POST EXPOSURE PROFILE    Collection Time: 03/24/17 11:00 PM   Result Value Ref Range    p24 Antigen NONREACTIVE NR      HIV-1,2 Ab REACTIVE (A) NR      Hepatitis C virus Ab PENDING Index    Hep C  virus Ab Interp. PENDING     Hep C  virus Ab comment PENDING     Hepatitis B surface Ag PENDING Index    Hep B surface Ag Interp. PENDING        IMAGING RESULTS:  CXR Results  (Last 48 hours)               03/24/17 2200  XR CHEST PORT Final result    Impression:   impression: No acute infiltrate. Narrative:  Clinical indication: Cough. Portable AP semierect view of the chest obtained, comparison August 2, 2015. The   heart size is normal. There is no acute infiltrate. CT Results  (Last 48 hours)               03/25/17 0057  CT ABD PELV W CONT Final result    Impression:  IMPRESSION: Cirrhosis and splenomegaly. Ascites. Small metallic marker was taped in the area of clinical suspicion. A discrete   mass is not seen. Slight asymmetry of size of parotid glands. No compromise of   the airway. No definite masses or adenopathy, major vessels do appear patent. Submandibular glands appear unremarkable. impression: No significant neck abnormalities seen. Narrative:  Clinical indication: Left neck mass, abdominal pain. Axial CT scan of the neck abdomen and pelvis obtained after intravenous   injection of 100 cc of Isovue-370. No oral contrast. No prior.        Liver is somewhat nodular likely related to cirrhosis. The spleen is enlarged. There is a mild to moderate amount of ascites. Gallstones are seen in a   nondistended gallbladder. The pancreas does appear unremarkable. Kidneys are   unobstructed. There is no bowel wall thickening or obstruction. There is diffuse   anasarca. There is no free air, the bladder is midline. Prior hysterectomy. 03/25/17 0057  CT NECK SOFT TISSUE W CONT Final result    Impression:  IMPRESSION: Cirrhosis and splenomegaly. Ascites. Small metallic marker was taped in the area of clinical suspicion. A discrete   mass is not seen. Slight asymmetry of size of parotid glands. No compromise of   the airway. No definite masses or adenopathy, major vessels do appear patent. Submandibular glands appear unremarkable. impression: No significant neck abnormalities seen. Narrative:  Clinical indication: Left neck mass, abdominal pain. Axial CT scan of the neck abdomen and pelvis obtained after intravenous   injection of 100 cc of Isovue-370. No oral contrast. No prior. Liver is somewhat nodular likely related to cirrhosis. The spleen is enlarged. There is a mild to moderate amount of ascites. Gallstones are seen in a   nondistended gallbladder. The pancreas does appear unremarkable. Kidneys are   unobstructed. There is no bowel wall thickening or obstruction. There is diffuse   anasarca. There is no free air, the bladder is midline. Prior hysterectomy.                  MEDICATIONS GIVEN:  Medications   0.9% sodium chloride infusion 250 mL (not administered)   cefTRIAXone (ROCEPHIN) 1 g in 0.9% sodium chloride (MBP/ADV) 50 mL (not administered)   sodium chloride 0.9 % bolus infusion 500 mL (0 mL IntraVENous IV Completed 3/24/17 2336)   morphine injection 4 mg (4 mg IntraVENous Given 3/24/17 2223)   0.9% sodium chloride infusion (50 mL/hr IntraVENous New Bag 3/25/17 0057)   iopamidol (ISOVUE-370) 76 % injection 100 mL (100 mL IntraVENous Given 3/25/17 3360)   sodium chloride (NS) flush 10 mL (10 mL IntraVENous Given 3/25/17 0057)       IMPRESSION:  1. Anemia, unspecified type        PLAN:  1. Admit to Hospitalist    Admit Note:  2:30 AM  Pt is being admitted by Victory Nissen, MD. The results of their tests and reason(s) for their admission have been discussed with pt and/or available family. They convey agreement and understanding for the need to be admitted and for admission diagnosis. This note is prepared by Ghulam Banuelos, acting as a Scribe for Candace Gregg MD.    Candace Gregg MD: The scribe's documentation has been prepared under my direction and personally reviewed by me in its entirety. I confirm that the notes above accurately reflects all work, treatment, procedures, and medical decision making performed by me. no

## 2022-10-24 NOTE — PROGRESS NOTES
Amanda Padron  1969  53 y.o. female   PCP: 2022  BC: 263 per pt    10/24/2022     Chief Complaint   Patient presents with   • Right Ankle - Follow-up       History of Present Illness    Amanda Padron is a 53 y.o.female presents to clinic today for her post op appointment. She had an ankle hardware removal on 10/20/22.  .     Past Medical History:   Diagnosis Date   • Acute conjunctivitis    • Anxiety    • Arthritis    • Brittle diabetes mellitus (HCC)     not controlled due to hypoglycemia      • CKD (chronic kidney disease)    • Depression    • Diabetic retinopathy (HCC)    • Dyslipidemia    • GERD (gastroesophageal reflux disease)    • Hypertensive disorder    • Plantar fasciitis    • Vitamin D deficiency          Past Surgical History:   Procedure Laterality Date   • ANKLE OPEN REDUCTION INTERNAL FIXATION Right 2022    Procedure: rightANKLE OPEN REDUCTION INTERNAL FIXATION;  Surgeon: Familia Infante DPM;  Location: [facility];  Service: Podiatry;  Laterality: Right;   •  SECTION      with tubal   • DILATATION AND CURETTAGE           Family History   Problem Relation Age of Onset   • Diabetes Sister    • Coronary artery disease Other    • Diabetes Other    • Hypertension Other        Allergies   Allergen Reactions   • Basaglar Kwikpen [Insulin Glargine] Other (See Comments)     Lantus   • Ramipril Arrhythmia and Dizziness   • Tresiba [Insulin Degludec] Other (See Comments)     Hypoglycemia         Social History     Socioeconomic History   • Marital status:    Tobacco Use   • Smoking status: Never   • Smokeless tobacco: Never   Vaping Use   • Vaping Use: Never used   Substance and Sexual Activity   • Alcohol use: Yes     Comment: occasionally   • Drug use: Never   • Sexual activity: Defer         Current Outpatient Medications   Medication Sig Dispense Refill   • acetaminophen (TYLENOL) 650 MG 8 hr tablet Take 650 mg by mouth Every 8 (Eight) Hours As Needed for Mild Pain .   ID Progress Note  5/15/2017       vanco  Discontinued  merrem  Discontinued   levaquin  linezolid     Subjective:     Afebrile. Has some leg pain. Objective:     Vitals:   Visit Vitals    /79 (BP 1 Location: Right arm, BP Patient Position: Post activity)    Pulse 72    Temp 97.6 °F (36.4 °C)    Resp 16    Ht 5' 6\" (1.676 m)    Wt 64.5 kg (142 lb 3 oz)  Comment: unable to stand at present time    SpO2 100%    BMI 22.95 kg/m2        Tmax:  Temp (24hrs), Av.7 °F (36.5 °C), Min:97.6 °F (36.4 °C), Max:97.8 °F (36.6 °C)      Exam:    Not in distress  Lungs: clear to auscultation  Heart: s1, s2, no murmurs   Abdomen: soft, nontender  Bilateral legs have some scabs. There is no louis cellulitis. Labs:   Lab Results   Component Value Date/Time    WBC 3.7 05/15/2017 04:44 AM    Hemoglobin (POC) 14.1 2012 05:07 PM    HGB 8.9 05/15/2017 04:44 AM    HCT 27.5 05/15/2017 04:44 AM    PLATELET 301  04:44 AM    MCV 84.6 05/15/2017 04:44 AM     Lab Results   Component Value Date/Time    Sodium 136 05/15/2017 04:44 AM    Potassium 4.2 05/15/2017 04:44 AM    Chloride 109 05/15/2017 04:44 AM    CO2 19 05/15/2017 04:44 AM    Anion gap 8 05/15/2017 04:44 AM    Glucose 145 05/15/2017 04:44 AM    BUN 16 05/15/2017 04:44 AM    Creatinine 0.90 05/15/2017 04:44 AM    BUN/Creatinine ratio 18 05/15/2017 04:44 AM    GFR est AA >60 05/15/2017 04:44 AM    GFR est non-AA >60 05/15/2017 04:44 AM    Calcium 8.0 05/15/2017 04:44 AM    Bilirubin, total 0.6 2017 06:31 AM    AST (SGOT) 88 2017 06:31 AM    Alk. phosphatase 96 2017 06:31 AM    Protein, total 7.6 2017 06:31 AM    Albumin 1.6 2017 06:31 AM    Globulin 6.0 2017 06:31 AM    A-G Ratio 0.3 2017 06:31 AM    ALT (SGPT) 69 2017 06:31 AM           Assessment:       1. Human immunodeficiency virus. 2. Hepatitis C with liver cirrhosis. 3. Bilateral leg ulcers.    4. Diabetes.       Recommendations:     cd4    • atorvastatin (LIPITOR) 40 MG tablet Take 1 tablet by mouth Every Night.     • buPROPion (WELLBUTRIN) 100 MG tablet Take 100 mg by mouth 2 (Two) Times a Day.     • busPIRone (BUSPAR) 5 MG tablet Take 5 mg by mouth 2 (Two) Times a Day.     • cetirizine (zyrTEC) 10 MG tablet Take 10 mg by mouth Daily.     • Continuous Blood Gluc Sensor (FreeStyle Marquis 2 Sensor) misc USE AS DIRECTED EVERY  14  DAYS 2 each 0   • famotidine (PEPCID) 40 MG tablet Take 40 mg by mouth Every Night.     • furosemide (LASIX) 20 MG tablet Take 1 tablet by mouth once daily 30 tablet 0   • gabapentin (Neurontin) 300 MG capsule Take 1 capsule by mouth 3 (Three) Times a Day. 30 capsule 0   • Glucagon (Gvoke HypoPen 2-Pack) 1 MG/0.2ML solution auto-injector Inject 1 mg under the skin into the appropriate area as directed As Needed (for hypoglycemia). 0.4 mL 1   • Glucose Blood (Blood Glucose Test) strip Use 4 x daily use any brand covered by insurance or same brand as before ICD10 code is E11.9 120 each 11   • HumaLOG KwikPen 100 UNIT/ML solution pen-injector INJECT UP TO 20 UNITS SUBCUTANEOUSLY ONCE DAILY WITH MEALS 15 mL 0   • hydrALAZINE (APRESOLINE) 50 MG tablet Take 50 mg by mouth 2 (Two) Times a Day.     • HYDROcodone-acetaminophen (Norco) 7.5-325 MG per tablet Take 1 tablet by mouth Every 8 (Eight) Hours As Needed for Moderate Pain. 30 tablet 0   • Insulin Glargine, 2 Unit Dial, (Toujeo Max SoloStar) 300 UNIT/ML solution pen-injector injection Inject 30 Units under the skin into the appropriate area as directed every night at bedtime. (Patient taking differently: Inject 28 Units under the skin into the appropriate area as directed every night at bedtime.) 6 mL 11   • Insulin Pen Needle (B-D ULTRAFINE III SHORT PEN) 31G X 8 MM misc Use to inject insulin 4 times per day 120 each 11   • Lancets Misc. (Accu-Chek Multiclix Lancet Dev) kit Provide lancing device and 5 lancets per day use as needed 150 each 11   • lisinopril (PRINIVIL,ZESTRIL)  is 389 with viral load of 359,000. She does not need prophylaxis for OIs. She will need treatment but this should started as an outpatient as a test for her compliance. Cryoglobulin sent earlier, but this test was negative in March 2017    Note of concern for Kaposi. Biopsy in march 2017 did not reveal this. Her current cd4 count also makes this less likely. She has a line again. Will restart merrem. For outpatient therapy, can get ertapenem 1 gm IM at the infusion center     .      Freda Orozco MD "10 MG tablet Take 1 tablet by mouth Daily. 30 tablet 11   • ReliOn Pen Needles 31G X 6 MM misc USE  4 TIMES DAILY 100 each 5   • sertraline (ZOLOFT) 100 MG tablet Take 100 mg by mouth 2 (Two) Times a Day.     • tiZANidine (ZANAFLEX) 4 MG tablet Take 4 mg by mouth At Night As Needed for Muscle Spasms.       No current facility-administered medications for this visit.       Review of Systems   Constitutional: Negative.    Respiratory: Negative.    Cardiovascular: Negative.    Gastrointestinal: Negative.    Musculoskeletal:        Right ankle pain   Skin: Negative.    Psychiatric/Behavioral: Negative.          OBJECTIVE    Pulse 86   Ht 162.6 cm (64\")   Wt 82.1 kg (181 lb)   SpO2 99%   BMI 31.07 kg/m²     Physical Exam  Vitals reviewed.   Constitutional:       General: She is not in acute distress.     Appearance: She is well-developed.   HENT:      Head: Normocephalic and atraumatic.      Nose: Nose normal.   Eyes:      Conjunctiva/sclera: Conjunctivae normal.      Pupils: Pupils are equal, round, and reactive to light.   Pulmonary:      Effort: Pulmonary effort is normal. No respiratory distress.      Breath sounds: No wheezing.   Musculoskeletal:         General: No deformity. Normal range of motion.   Skin:     General: Skin is warm and dry.      Capillary Refill: Capillary refill takes less than 2 seconds.   Neurological:      Mental Status: She is alert and oriented to person, place, and time.   Psychiatric:         Behavior: Behavior normal.         Thought Content: Thought content normal.          Right lower extremity.  Incision site well approximated.  No signs of dehiscence.    Procedures        ASSESSMENT AND PLAN    Diagnoses and all orders for this visit:    1. Painful orthopaedic hardware (HCC) (Primary)  -     Cancel: XR Ankle 3+ View Right        -Patient doing well postoperatively.  Site care as instructed.  Recheck 1 week          This document has been electronically signed by Familia Infante, " MAYRAM on October 26, 2022 14:56 CDT     10/26/2022  14:56 CDT

## 2023-07-19 NOTE — PROGRESS NOTES
Kristi RENEE SHORT VISIT NOTE:    1040 hrs Pt arrived to [facility] via wheelchair accompanied by daughter and in no distress for Invanz. Denies any new complaints. Blood pressure 153/85, pulse 92, temperature 98.5 °F (36.9 °C), resp. rate 18, SpO2 100 %. Medication given:  Invanz IM R-GM    1050 hrs Discharged home ambulatory and in no distress. Tolerated treatment well.  Next appointment 05/21/17 @ 10 am. SW met with pt at bedside to complete assessment. Pt is AxO x3  and able to verbally answer assessment questions. Pt confirmed demographic information. Pt lives at home withh er daughter and feels safe. Pt reports while at home being independent of ADL's with no supporting DME. Pt drives and able to attend follow up appointments. Pt family will transport her home at time of discharge. Pt is open to PCC follow up and reports active with my charts. SW updated whiteboard with [facility] name and contact information. SW confirmed pt understanding of Observation unit and expected discharge plan. SW will continue to follow pt throughout care and assist with any discharge needs.         07/19/23 1001   Discharge Planning   Assessment Type Discharge Planning Brief Assessment   Resource/Environmental Concerns none   Support Systems Children;Family members;Parent   Equipment Currently Used at Home none   Current Living Arrangements home   Patient/Family Anticipates Transition to home with family   Patient/Family Anticipated Services at Transition none   DME Needed Upon Discharge  none   Discharge Plan A Home with family       Future Appointments   Date Time Provider Department Center   7/19/2023 10:45 AM [facility] NM1 [facility] NUCLE[facility]   7/24/2023  9:30 AM NUCLEAR CARDIOLOGY [facility] SPECCPR [place] [facility]   4/19/2024  8:00 AM LABORATORY, [facility] LAB [facility] Case [facility]  [phone]